# Patient Record
Sex: MALE | Race: WHITE | URBAN - METROPOLITAN AREA
[De-identification: names, ages, dates, MRNs, and addresses within clinical notes are randomized per-mention and may not be internally consistent; named-entity substitution may affect disease eponyms.]

---

## 2019-06-10 ENCOUNTER — INPATIENT (INPATIENT)
Facility: HOSPITAL | Age: 64
LOS: 7 days | Discharge: NURSING, REHAB OR LONG-TERM CARE | End: 2019-06-18
Attending: NEUROLOGICAL SURGERY | Admitting: NEUROLOGICAL SURGERY
Payer: COMMERCIAL

## 2019-06-10 VITALS
SYSTOLIC BLOOD PRESSURE: 154 MMHG | HEART RATE: 66 BPM | WEIGHT: 186.07 LBS | HEIGHT: 70 IN | TEMPERATURE: 97 F | OXYGEN SATURATION: 99 % | DIASTOLIC BLOOD PRESSURE: 83 MMHG | RESPIRATION RATE: 16 BRPM

## 2019-06-10 LAB
ALBUMIN SERPL ELPH-MCNC: 4.7 G/DL — SIGNIFICANT CHANGE UP (ref 3.5–5.2)
ALP SERPL-CCNC: 79 U/L — SIGNIFICANT CHANGE UP (ref 30–115)
ALT FLD-CCNC: 26 U/L — SIGNIFICANT CHANGE UP (ref 0–41)
ANION GAP SERPL CALC-SCNC: 15 MMOL/L — HIGH (ref 7–14)
AST SERPL-CCNC: 26 U/L — SIGNIFICANT CHANGE UP (ref 0–41)
BILIRUB SERPL-MCNC: 0.4 MG/DL — SIGNIFICANT CHANGE UP (ref 0.2–1.2)
BUN SERPL-MCNC: 13 MG/DL — SIGNIFICANT CHANGE UP (ref 10–20)
CALCIUM SERPL-MCNC: 9.7 MG/DL — SIGNIFICANT CHANGE UP (ref 8.5–10.1)
CHLORIDE SERPL-SCNC: 103 MMOL/L — SIGNIFICANT CHANGE UP (ref 98–110)
CO2 SERPL-SCNC: 25 MMOL/L — SIGNIFICANT CHANGE UP (ref 17–32)
CREAT SERPL-MCNC: 0.8 MG/DL — SIGNIFICANT CHANGE UP (ref 0.7–1.5)
GLUCOSE SERPL-MCNC: 100 MG/DL — HIGH (ref 70–99)
HCT VFR BLD CALC: 38.9 % — LOW (ref 42–52)
HGB BLD-MCNC: 13.7 G/DL — LOW (ref 14–18)
MAGNESIUM SERPL-MCNC: 2.2 MG/DL — SIGNIFICANT CHANGE UP (ref 1.8–2.4)
MCHC RBC-ENTMCNC: 31.4 PG — HIGH (ref 27–31)
MCHC RBC-ENTMCNC: 35.2 G/DL — SIGNIFICANT CHANGE UP (ref 32–37)
MCV RBC AUTO: 89 FL — SIGNIFICANT CHANGE UP (ref 80–94)
NRBC # BLD: 0 /100 WBCS — SIGNIFICANT CHANGE UP (ref 0–0)
PLATELET # BLD AUTO: 257 K/UL — SIGNIFICANT CHANGE UP (ref 130–400)
POTASSIUM SERPL-MCNC: 4.1 MMOL/L — SIGNIFICANT CHANGE UP (ref 3.5–5)
POTASSIUM SERPL-SCNC: 4.1 MMOL/L — SIGNIFICANT CHANGE UP (ref 3.5–5)
PROT SERPL-MCNC: 7.5 G/DL — SIGNIFICANT CHANGE UP (ref 6–8)
RBC # BLD: 4.37 M/UL — LOW (ref 4.7–6.1)
RBC # FLD: 12.6 % — SIGNIFICANT CHANGE UP (ref 11.5–14.5)
SODIUM SERPL-SCNC: 143 MMOL/L — SIGNIFICANT CHANGE UP (ref 135–146)
TROPONIN T SERPL-MCNC: <0.01 NG/ML — SIGNIFICANT CHANGE UP
WBC # BLD: 7.84 K/UL — SIGNIFICANT CHANGE UP (ref 4.8–10.8)
WBC # FLD AUTO: 7.84 K/UL — SIGNIFICANT CHANGE UP (ref 4.8–10.8)

## 2019-06-10 PROCEDURE — 93970 EXTREMITY STUDY: CPT | Mod: 26

## 2019-06-10 PROCEDURE — 99223 1ST HOSP IP/OBS HIGH 75: CPT | Mod: AI

## 2019-06-10 PROCEDURE — 70450 CT HEAD/BRAIN W/O DYE: CPT | Mod: 26

## 2019-06-10 PROCEDURE — 71046 X-RAY EXAM CHEST 2 VIEWS: CPT | Mod: 26

## 2019-06-10 PROCEDURE — 99285 EMERGENCY DEPT VISIT HI MDM: CPT

## 2019-06-10 RX ORDER — IBUPROFEN 200 MG
400 TABLET ORAL EVERY 6 HOURS
Refills: 0 | Status: DISCONTINUED | OUTPATIENT
Start: 2019-06-10 | End: 2019-06-13

## 2019-06-10 RX ORDER — HYDROCHLOROTHIAZIDE 25 MG
25 TABLET ORAL DAILY
Refills: 0 | Status: DISCONTINUED | OUTPATIENT
Start: 2019-06-10 | End: 2019-06-14

## 2019-06-10 RX ORDER — ASPIRIN/CALCIUM CARB/MAGNESIUM 324 MG
81 TABLET ORAL DAILY
Refills: 0 | Status: DISCONTINUED | OUTPATIENT
Start: 2019-06-10 | End: 2019-06-13

## 2019-06-10 RX ORDER — SIMVASTATIN 20 MG/1
40 TABLET, FILM COATED ORAL AT BEDTIME
Refills: 0 | Status: DISCONTINUED | OUTPATIENT
Start: 2019-06-10 | End: 2019-06-14

## 2019-06-10 RX ORDER — LISINOPRIL 2.5 MG/1
5 TABLET ORAL DAILY
Refills: 0 | Status: DISCONTINUED | OUTPATIENT
Start: 2019-06-10 | End: 2019-06-14

## 2019-06-10 RX ADMIN — Medication 81 MILLIGRAM(S): at 21:43

## 2019-06-10 NOTE — ED PROVIDER NOTE - NS ED ROS FT
Constitutional: NAD  Eyes: No visual changes, eye pain or discharge.  ENMT: No hearing changes, pain, discharge or infections. No neck pain or stiffness.  Cardiac: No chest pain, SOB or edema. No chest pain with exertion.  Respiratory: No cough or respiratory distress.   GI: No nausea, vomiting, diarrhea or abdominal pain.  : No dysuria, frequency or burning.  MS: No myalgia, muscle weakness, joint pain or back pain.  Neuro: No headache. + weakness/tingling. No LOC.  Skin: No skin rash.  Heme: No bruising

## 2019-06-10 NOTE — ED PROVIDER NOTE - PHYSICAL EXAMINATION
General: AOx4, Non toxic appearing, NAD, speaking in full sentences.   Skin: Warm and dry, no acute rash.   Head:  Normocephalic, atraumatic.   EENT: PERRL/EOMI, conjunctiva and sclera clear. MM moist, no nasal discharge.   Neck: Supple nt, no meningeal signs.   Lymph: No acute cervical lymphadenopathy  Heart RRR s1s2 nl, no rub/murmur.   Lungs- No retractions, BS equal, CTAB.   Abdomen: Soft ntnd no r/g.   Extremities- moves all, +equal distal pulses, brisk cap refill. No LE edema, calves nttp b/l.  Neuro: Sensation wnl, CN 2-12 grossly intact. +5/5 muscle strength throughout except .+3/5 in LUE, +4/5 LLE.  Psych: Cooperative, appropriate

## 2019-06-10 NOTE — ED PROVIDER NOTE - ATTENDING CONTRIBUTION TO CARE
62 yo M PMHx HTN presents with c/o left sided arm weakness associated with tingling sensation to his fingers.  Pt first noted symptoms about 2 weeks ago.  He was experiencing left sided neck pain and thought that it may be a pinched nerve.  He was seen by chiropractor and had xrays done.  No h/o trauma.  Pt finding it difficult to preform tasks such as tieing his shoes.  He also started to note that he is feeling a little off balance,  no change in speech, no headache, no change in vision,  no CP, no SOB.  Pt was seen at [facility] today and sent here for further evaluation.  On exam pt in NAD AAO x 3, no signs of trauma, PERRL, EOMI, face symmetrical, speech is clear and fluent, no midline vertebral tenderness, + spasm to left cervical paraspinals, decreased  left side wih 4/5 left UE, no carotid bruit, abd soft nt nd

## 2019-06-10 NOTE — H&P ADULT - NSHPPHYSICALEXAM_GEN_ALL_CORE
Not in acute distress  General: No pallor, No icterus, afebrile  HEENT: No JVD, no Bruit.  Heart: S1+S2 audible  Lungs: bilateral  fair air entry, no wheezing, no crepitations.  Abdomen: Soft, non-tender, non-distended , no  rigidity or guarding.  CNS:  sensations intact. L UE 4/5. RUE 5/5, LLE 4/5 , RLE 5/5  Extremities:  No edema Not in acute distress  General: No pallor, No icterus, afebrile  HEENT: No JVD, no Bruit.  Heart: S1+S2 audible  Lungs: bilateral  fair air entry, no wheezing, no crepitations.  Abdomen: Soft, non-tender, non-distended , no  rigidity or guarding.  CNS:  sensations intact. L UE 4/5. RUE 5/5, LLE 4/5 , RLE 5/5, gait abnormality  Extremities:  No edema

## 2019-06-10 NOTE — H&P ADULT - ASSESSMENT
63 year old  man with past medical history of  Hypertension only,  presents with complaint of L sided weakness and L arm numbness/tingling which he woke up with around 5/24. Pt     initially had some L sided neck pain and attributed the symptoms to a pinched nerve    Admitted to rule out sub-Acute Ischemic Stroke:  Neuro checks q shift  Aspirin 81 milligrams once daily   O2 via NC@2l/min to keep sat>94% at all times.  Tylenol PRN for Fever/pain  CT head is non-significant for acute changes    MRI-brain, cervical, MRA head/neck.  Zocor 40mg PO QHS.  Bed rest with Aspiration, Fall, Seizure precautions.  Neurology consult  Hypertension - well controlled on HCTZ & lisinopril  Active smoker- nicotinepatch  Will get TSH, HbA1c , lipid panel, lyme ab   PT and Rehab Consult after acute condition

## 2019-06-10 NOTE — H&P ADULT - NSHPREVIEWOFSYSTEMS_GEN_ALL_CORE
Patient denies any headache, any vision complaints, runny nose, fever, chills, sore throat. Denies chest pain, shortness of     breath, palpitation. Denies nausea, vomiting, abdominal pain, diarrhoea, Denies urinary burning, urgency, frequency, dysuria.   At least 10 systems were reviewed in ROS. All systems reviewed  are within normal limits except for the complaints as described in Subjective.

## 2019-06-10 NOTE — H&P ADULT - HISTORY OF PRESENT ILLNESS
63 year old  man with past medical history of  Hypertension  and active smoking only,  presents with complaint of L sided weakness and L arm numbness/tingling which he woke up with around 5/24. Pt     initially had some L sided neck pain and attributed the symptoms to a pinched nerve. Pt saw chiropractor who did plain films and concurred. Pt came to ED as the weakness is     worsening and affecting his work as a [employer]. Patient denies any headache, any vision complaints, runny nose, fever, chills, sore throat. Denies chest pain, shortness of     breath, palpitation. Denies nausea, vomiting, abdominal pain, diarrhoea, Denies urinary burning, urgency, frequency, dysuria.

## 2019-06-10 NOTE — ED PROVIDER NOTE - OBJECTIVE STATEMENT
63M pmh HTN p/w L sided weakness and L arm numbness/tingling which he woke up with around 5/24/19. Pt initially had some L sided neck pain and attributed the symptoms to a pinched nerve. Pt saw chiropractor who did plain films and concurred. 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 Denies f/c, HA, CP, back pain.

## 2019-06-11 LAB
ANION GAP SERPL CALC-SCNC: 10 MMOL/L — SIGNIFICANT CHANGE UP (ref 7–14)
B BURGDOR C6 AB SER-ACNC: NEGATIVE — SIGNIFICANT CHANGE UP
B BURGDOR IGG+IGM SER-ACNC: 0.03 INDEX — SIGNIFICANT CHANGE UP (ref 0.01–0.89)
BUN SERPL-MCNC: 14 MG/DL — SIGNIFICANT CHANGE UP (ref 10–20)
CALCIUM SERPL-MCNC: 9.3 MG/DL — SIGNIFICANT CHANGE UP (ref 8.5–10.1)
CHLORIDE SERPL-SCNC: 103 MMOL/L — SIGNIFICANT CHANGE UP (ref 98–110)
CO2 SERPL-SCNC: 29 MMOL/L — SIGNIFICANT CHANGE UP (ref 17–32)
CREAT SERPL-MCNC: 0.9 MG/DL — SIGNIFICANT CHANGE UP (ref 0.7–1.5)
ESTIMATED AVERAGE GLUCOSE: 117 MG/DL — HIGH (ref 68–114)
GLUCOSE SERPL-MCNC: 106 MG/DL — HIGH (ref 70–99)
HBA1C BLD-MCNC: 5.7 % — HIGH (ref 4–5.6)
HCT VFR BLD CALC: 40.9 % — LOW (ref 42–52)
HCV AB S/CO SERPL IA: 12.87 S/CO — HIGH (ref 0–0.99)
HCV AB SERPL-IMP: REACTIVE
HGB BLD-MCNC: 13.9 G/DL — LOW (ref 14–18)
MCHC RBC-ENTMCNC: 30.6 PG — SIGNIFICANT CHANGE UP (ref 27–31)
MCHC RBC-ENTMCNC: 34 G/DL — SIGNIFICANT CHANGE UP (ref 32–37)
MCV RBC AUTO: 90.1 FL — SIGNIFICANT CHANGE UP (ref 80–94)
NRBC # BLD: 0 /100 WBCS — SIGNIFICANT CHANGE UP (ref 0–0)
PLATELET # BLD AUTO: 244 K/UL — SIGNIFICANT CHANGE UP (ref 130–400)
POTASSIUM SERPL-MCNC: 4 MMOL/L — SIGNIFICANT CHANGE UP (ref 3.5–5)
POTASSIUM SERPL-SCNC: 4 MMOL/L — SIGNIFICANT CHANGE UP (ref 3.5–5)
RBC # BLD: 4.54 M/UL — LOW (ref 4.7–6.1)
RBC # FLD: 12.8 % — SIGNIFICANT CHANGE UP (ref 11.5–14.5)
SODIUM SERPL-SCNC: 142 MMOL/L — SIGNIFICANT CHANGE UP (ref 135–146)
TSH SERPL-MCNC: 2.79 UIU/ML — SIGNIFICANT CHANGE UP (ref 0.27–4.2)
WBC # BLD: 6.57 K/UL — SIGNIFICANT CHANGE UP (ref 4.8–10.8)
WBC # FLD AUTO: 6.57 K/UL — SIGNIFICANT CHANGE UP (ref 4.8–10.8)

## 2019-06-11 PROCEDURE — 72142 MRI NECK SPINE W/DYE: CPT | Mod: 26

## 2019-06-11 PROCEDURE — 99223 1ST HOSP IP/OBS HIGH 75: CPT

## 2019-06-11 PROCEDURE — 99233 SBSQ HOSP IP/OBS HIGH 50: CPT

## 2019-06-11 PROCEDURE — 70552 MRI BRAIN STEM W/DYE: CPT | Mod: 26

## 2019-06-11 PROCEDURE — 70548 MR ANGIOGRAPHY NECK W/DYE: CPT | Mod: 26

## 2019-06-11 RX ORDER — NICOTINE POLACRILEX 2 MG
1 GUM BUCCAL DAILY
Refills: 0 | Status: DISCONTINUED | OUTPATIENT
Start: 2019-06-11 | End: 2019-06-11

## 2019-06-11 RX ORDER — NICOTINE POLACRILEX 2 MG
1 GUM BUCCAL DAILY
Refills: 0 | Status: DISCONTINUED | OUTPATIENT
Start: 2019-06-11 | End: 2019-06-14

## 2019-06-11 RX ORDER — HEPARIN SODIUM 5000 [USP'U]/ML
5000 INJECTION INTRAVENOUS; SUBCUTANEOUS EVERY 8 HOURS
Refills: 0 | Status: DISCONTINUED | OUTPATIENT
Start: 2019-06-11 | End: 2019-06-14

## 2019-06-11 RX ORDER — OXYCODONE AND ACETAMINOPHEN 5; 325 MG/1; MG/1
2 TABLET ORAL EVERY 6 HOURS
Refills: 0 | Status: DISCONTINUED | OUTPATIENT
Start: 2019-06-11 | End: 2019-06-14

## 2019-06-11 RX ORDER — DEXAMETHASONE 0.5 MG/5ML
4 ELIXIR ORAL EVERY 6 HOURS
Refills: 0 | Status: DISCONTINUED | OUTPATIENT
Start: 2019-06-11 | End: 2019-06-14

## 2019-06-11 RX ORDER — OXYCODONE HYDROCHLORIDE 5 MG/1
7.5 TABLET ORAL ONCE
Refills: 0 | Status: DISCONTINUED | OUTPATIENT
Start: 2019-06-11 | End: 2019-06-11

## 2019-06-11 RX ORDER — PANTOPRAZOLE SODIUM 20 MG/1
40 TABLET, DELAYED RELEASE ORAL
Refills: 0 | Status: DISCONTINUED | OUTPATIENT
Start: 2019-06-11 | End: 2019-06-11

## 2019-06-11 RX ORDER — PANTOPRAZOLE SODIUM 20 MG/1
40 TABLET, DELAYED RELEASE ORAL
Refills: 0 | Status: DISCONTINUED | OUTPATIENT
Start: 2019-06-11 | End: 2019-06-14

## 2019-06-11 RX ORDER — DEXAMETHASONE 0.5 MG/5ML
10 ELIXIR ORAL ONCE
Refills: 0 | Status: COMPLETED | OUTPATIENT
Start: 2019-06-11 | End: 2019-06-11

## 2019-06-11 RX ADMIN — OXYCODONE AND ACETAMINOPHEN 2 TABLET(S): 5; 325 TABLET ORAL at 11:46

## 2019-06-11 RX ADMIN — Medication 81 MILLIGRAM(S): at 11:23

## 2019-06-11 RX ADMIN — OXYCODONE AND ACETAMINOPHEN 2 TABLET(S): 5; 325 TABLET ORAL at 11:24

## 2019-06-11 RX ADMIN — OXYCODONE AND ACETAMINOPHEN 2 TABLET(S): 5; 325 TABLET ORAL at 17:21

## 2019-06-11 RX ADMIN — Medication 25 MILLIGRAM(S): at 05:38

## 2019-06-11 RX ADMIN — OXYCODONE HYDROCHLORIDE 7.5 MILLIGRAM(S): 5 TABLET ORAL at 05:56

## 2019-06-11 RX ADMIN — LISINOPRIL 5 MILLIGRAM(S): 2.5 TABLET ORAL at 05:38

## 2019-06-11 RX ADMIN — Medication 1 PATCH: at 21:31

## 2019-06-11 RX ADMIN — Medication 1 PATCH: at 11:24

## 2019-06-11 RX ADMIN — Medication 10 MILLIGRAM(S): at 21:30

## 2019-06-11 NOTE — PHYSICAL THERAPY INITIAL EVALUATION ADULT - TRANSFER SAFETY CONCERNS NOTED: SIT/STAND, REHAB EVAL
decreased proprioception/losing balance/decreased step length/decreased balance during turns/decreased weight-shifting ability

## 2019-06-11 NOTE — PHYSICAL THERAPY INITIAL EVALUATION ADULT - GENERAL OBSERVATIONS, REHAB EVAL
10:40 - 11:01. Chart reviewed. Patient available to be seen for physical therapy, confirmed with nurse. Patient encountered already seated at edge of bed.  Agreeable for PT evaluation.

## 2019-06-11 NOTE — PHYSICAL THERAPY INITIAL EVALUATION ADULT - GAIT DEVIATIONS NOTED, PT EVAL
decreased chava/increased time in double stance/decreased step length/decreased weight-shifting ability

## 2019-06-11 NOTE — PROVIDER CONTACT NOTE (OTHER) - SITUATION
During site verification of Nicotine patch pt reports patch fell off at [facility] today during MRI.

## 2019-06-11 NOTE — CHART NOTE - NSCHARTNOTEFT_GEN_A_CORE
will order 7.5 mg oxycodone for back pain, single dose will order 7.5 mg oxycodone for back pain, single dose. He uses hydrocodone at home

## 2019-06-11 NOTE — CONSULT NOTE ADULT - SUBJECTIVE AND OBJECTIVE BOX
Neurology Consultation note    Name  INDIA HAYES    HPI:  63 year old  man with past medical history of  Hypertension  and active smoking only,  presents with complaint of L sided weakness and L arm numbness/tingling which he woke up with around 5/24. Pt     initially had some L sided neck pain and attributed the symptoms to a pinched nerve. Pt saw chiropractor who did plain films and concurred. Pt came to ED as the weakness is     worsening and affecting his work as a [employer]. Patient denies any headache, any vision complaints, runny nose, fever, chills, sore throat. Denies chest pain, shortness of     breath, palpitation. Denies nausea, vomiting, abdominal pain, diarrhoea, Denies urinary burning, urgency, frequency, dysuria. (10 Adonis 2019 18:44)    NEURO:  PATIENT IS A 62 YO MAN WITH HX OF HTN WHO PRESENTS WITH 2 WEEKS OF L HP. HE STATES THAT IT STARTED WITH NECK PAIN WHICH RADIATED DOWN THE LEFT ARM.  IT PROGRESSED OVER 2 WEEKS TO INVOLVE THE RIGHT ARM. BOTH HANDS ARE NUMB AND WEAK.  FURTHER PROGRESSION LED TO WEAKNESS OF THE LEFT LEG AND IMPAIRED GAIT. HE FINALLY CAME TO THE ER BECAUSE THE HAND WEAKNESS WAS INTERFERING WITH HIS WORK AS AN . NO SPHINCTER DYSFUNCTION. NO FACIAL INVOLVEMENT.      Vital Signs Last 24 Hrs  T(C): 35.7 (11 Jun 2019 05:47), Max: 36.7 (10 Adonis 2019 17:05)  T(F): 96.2 (11 Jun 2019 05:47), Max: 98.1 (10 Adonis 2019 17:05)  HR: 61 (11 Jun 2019 05:47) (59 - 67)  BP: 150/77 (11 Jun 2019 05:47) (128/74 - 159/78)  BP(mean): --  RR: 16 (11 Jun 2019 05:47) (16 - 16)  SpO2: 98% (10 Adonis 2019 19:40) (98% - 99%)    Neurological Exam:   Mental status: Awake, alert and oriented x3.  Recent and remote memory intact.  Naming, repetition and comprehension intact.  Attention/concentration intact.  No dysarthria, no aphasia.  Fund of knowledge appropriate.    Cranial nerves: Pupils equally round and reactive to light, visual fields full, no nystagmus, extraocular muscles intact, V1 through V3 intact bilaterally and symmetric, face symmetric, hearing intact to finger rub, palate elevation symmetric, tongue was midline.  Motor:  MRC grading 5/5 b/l UE/LE.   strength 5/5.  Normal tone and bulk.  No abnormal movements.    Sensation: Intact to light touch, proprioception, and pinprick.   Coordination: No dysmetria on finger-to-nose and heel-to-shin.  No dysdiadokinesia.  Reflexes: 2+ in bilateral UE/LE, downgoing toes bilaterally. (-) Boykin.  Gait: Narrow and steady. No ataxia.  Romberg negative    Medications  aspirin  chewable 81 milliGRAM(s) Oral daily  heparin  Injectable 5000 Unit(s) SubCutaneous every 8 hours  hydrochlorothiazide 25 milliGRAM(s) Oral daily  ibuprofen  Tablet. 400 milliGRAM(s) Oral every 6 hours PRN  lisinopril 5 milliGRAM(s) Oral daily  nicotine - 21 mG/24Hr(s) Patch 1 patch Transdermal daily  oxyCODONE    5 mG/acetaminophen 325 mG 2 Tablet(s) Oral every 6 hours PRN  simvastatin 40 milliGRAM(s) Oral at bedtime      Lab  06-11    142  |  103  |  14  ----------------------------<  106<H>  4.0   |  29  |  0.9    Ca    9.3      11 Jun 2019 06:51  Mg     2.2     06-10    TPro  7.5  /  Alb  4.7  /  TBili  0.4  /  DBili  x   /  AST  26  /  ALT  26  /  AlkPhos  79  06-10                          13.9   6.57  )-----------( 244      ( 11 Jun 2019 06:51 )             40.9     LIVER FUNCTIONS - ( 10 Adonis 2019 17:18 )  Alb: 4.7 g/dL / Pro: 7.5 g/dL / ALK PHOS: 79 U/L / ALT: 26 U/L / AST: 26 U/L / GGT: x             2.2        Radiology  CT Head No Cont:   EXAM:  CT BRAIN            PROCEDURE DATE:  06/10/2019            INTERPRETATION:  Clinical History / Reason for exam: Patient with   left-sided weakness left arm numbness and tingling.    CT SCAN OF THE BRAIN WITHOUT CONTRAST    TECHNIQUE:    Multiple transaxial noncontrast CT images of the brain were obtained from   base to vertex. Sagittal and coronal reformatted images were obtained.    COMPARISON:    Noncontrast CT scan of the brain dated August 29, 2005.    FINDINGS:    The third, fourth,and lateral ventricles are normal in size and   position.     There is no shift of the midline structures or evidence of acute   intracranial hemorrhage or depressed skull fracture.     Bilateral basal ganglia calcifications.    Mucosal thickening inthe bilateral frontal, bilateral ethmoid, and left   sphenoid sinuses.    No large territorial infarct.    IMPRESSION:     No acute intracranial hemorrhage or large territorial infarct.    In view of the patient's stated clinical history of possible   cerebrovascular accident, follow-up examination is recommended, if   clinically warranted.                  MARIAMA JARVIS M.D., ATTENDING RADIOLOGIST  This document has been electronically signed. Adonis 10 2019  5:41PM             (06-10-19 @ 17:32)      Assessment:    Plan: Neurology Consultation note    Name  INDIA HAYES    HPI:  63 year old  man with past medical history of  Hypertension  and active smoking only,  presents with complaint of L sided weakness and L arm numbness/tingling which he woke up with around 5/24. Pt     initially had some L sided neck pain and attributed the symptoms to a pinched nerve. Pt saw chiropractor who did plain films and concurred. Pt came to ED as the weakness is     worsening and affecting his work as a [employer]. Patient denies any headache, any vision complaints, runny nose, fever, chills, sore throat. Denies chest pain, shortness of     breath, palpitation. Denies nausea, vomiting, abdominal pain, diarrhoea, Denies urinary burning, urgency, frequency, dysuria. (10 Adonis 2019 18:44)    NEURO:  PATIENT IS A 64 YO MAN WITH HX OF HTN WHO PRESENTS WITH 2 WEEKS OF L HP. HE STATES THAT IT STARTED WITH NECK PAIN WHICH RADIATED DOWN THE LEFT ARM.  IT PROGRESSED OVER 2 WEEKS TO INVOLVE THE RIGHT ARM. BOTH HANDS ARE NUMB AND WEAK.  FURTHER PROGRESSION LED TO WEAKNESS OF THE LEFT LEG AND IMPAIRED GAIT. HE FINALLY CAME TO THE ER BECAUSE THE HAND WEAKNESS WAS INTERFERING WITH HIS WORK AS AN . NO SPHINCTER DYSFUNCTION. NO FACIAL INVOLVEMENT.  STATES HE FEELS LIKE HE PULLED A TENSE MUSCLE ON THE LEFT       Vital Signs Last 24 Hrs  T(C): 35.7 (11 Jun 2019 05:47), Max: 36.7 (10 Adonis 2019 17:05)  T(F): 96.2 (11 Jun 2019 05:47), Max: 98.1 (10 Adonis 2019 17:05)  HR: 61 (11 Jun 2019 05:47) (59 - 67)  BP: 150/77 (11 Jun 2019 05:47) (128/74 - 159/78)  BP(mean): --  RR: 16 (11 Jun 2019 05:47) (16 - 16)  SpO2: 98% (10 Adonis 2019 19:40) (98% - 99%)    Neurological Exam:   Mental status: Awake, alert and oriented x3.  Recent and remote memory intact.  Naming, repetition and comprehension intact.  Attention/concentration intact.  No dysarthria, no aphasia.  Fund of knowledge appropriate.    Cranial nerves: Pupils equally round and reactive to light, visual fields full, no nystagmus, extraocular muscles intact, V1 through V3 intact bilaterally and symmetric, face symmetric, hearing intact to finger rub, palate elevation symmetric, tongue was midline.  Motor:  MRC grading 4/5 LUE, 4+ PROXIMAL LLE  Sensation: Intact to light touch, proprioception, and pinprick.  NO SENSORY LEVEL  Coordination: No dysmetria on finger-to-nose and heel-to-shin.  No dysdiadokinesia.  Reflexes: 2+ in bilateral UE/LE, downgoing toes bilaterally. (-) Boykin.  Gait: circumduction LLE, SPASTIC    Medications  aspirin  chewable 81 milliGRAM(s) Oral daily  heparin  Injectable 5000 Unit(s) SubCutaneous every 8 hours  hydrochlorothiazide 25 milliGRAM(s) Oral daily  ibuprofen  Tablet. 400 milliGRAM(s) Oral every 6 hours PRN  lisinopril 5 milliGRAM(s) Oral daily  nicotine - 21 mG/24Hr(s) Patch 1 patch Transdermal daily  oxyCODONE    5 mG/acetaminophen 325 mG 2 Tablet(s) Oral every 6 hours PRN  simvastatin 40 milliGRAM(s) Oral at bedtime      Lab  06-11    142  |  103  |  14  ----------------------------<  106<H>  4.0   |  29  |  0.9    Ca    9.3      11 Jun 2019 06:51  Mg     2.2     06-10    TPro  7.5  /  Alb  4.7  /  TBili  0.4  /  DBili  x   /  AST  26  /  ALT  26  /  AlkPhos  79  06-10                          13.9   6.57  )-----------( 244      ( 11 Jun 2019 06:51 )             40.9     LIVER FUNCTIONS - ( 10 Adonis 2019 17:18 )  Alb: 4.7 g/dL / Pro: 7.5 g/dL / ALK PHOS: 79 U/L / ALT: 26 U/L / AST: 26 U/L / GGT: x             2.2        Radiology  CT Head No Cont:   EXAM:  CT BRAIN            PROCEDURE DATE:  06/10/2019            INTERPRETATION:  Clinical History / Reason for exam: Patient with   left-sided weakness left arm numbness and tingling.    CT SCAN OF THE BRAIN WITHOUT CONTRAST    TECHNIQUE:    Multiple transaxial noncontrast CT images of the brain were obtained from   base to vertex. Sagittal and coronal reformatted images were obtained.    COMPARISON:    Noncontrast CT scan of the brain dated August 29, 2005.    FINDINGS:    The third, fourth,and lateral ventricles are normal in size and   position.     There is no shift of the midline structures or evidence of acute   intracranial hemorrhage or depressed skull fracture.     Bilateral basal ganglia calcifications.    Mucosal thickening inthe bilateral frontal, bilateral ethmoid, and left   sphenoid sinuses.    No large territorial infarct.    IMPRESSION:     No acute intracranial hemorrhage or large territorial infarct.    In view of the patient's stated clinical history of possible   cerebrovascular accident, follow-up examination is recommended, if   clinically warranted.                Assessment: 64 YO MAN  WITH 2 WEEKS OF L > R SIDED WEAKNESS PRECEDED BY NECK PAIN AND LUE PAIN.  PATIENT ALSO WITH GRADUAL PROGRESSION OF SYMPTOMS AND NOW BILATERAL HAND NUMBNESS.  THIS CONSTELLATION OF SYMPTOMS AND GRADUAL PROGRESSION OVER 2 WEEKS MAKE CERVICAL COR PATHOLOGY MORE LIKELY. GIVEN LEFT SIDED PREPONDERANCE WILL ALSO IMAGE THE BRAIN.    Plan:  MRI C SPINE AND BRAIN NC  PT/ERHAB  FURTHER RECS TO FOLLOW MRI

## 2019-06-11 NOTE — PROGRESS NOTE ADULT - SUBJECTIVE AND OBJECTIVE BOX
Progress Note:  Provider Speciality                            Hospitalist      CELYINDIA NIETO MRN-[id number] 63y Male     CHIEF PRESENTING COMPLAINT:  Patient is a 63y old  Male who presents with a chief complaint of L sided weakness (10 Adonis 2019 18:44)        SUBJECTIVE:  Patient was seen and examined at bedside. Reports no improvement in  presenting complaint. No significant overnight events reported.     HISTORY OF PRESENTING ILLNESS:  HPI:  63 year old  man with past medical history of  Hypertension  and active smoking only,  presents with complaint of L sided weakness and L arm numbness/tingling which he woke up with around 5/24. Pt     initially had some L sided neck pain and attributed the symptoms to a pinched nerve. Pt saw chiropractor who did plain films and concurred. Pt came to ED as the weakness is     worsening and affecting his work as a [employer]. Patient denies any headache, any vision complaints, runny nose, fever, chills, sore throat. Denies chest pain, shortness of     breath, palpitation. Denies nausea, vomiting, abdominal pain, diarrhoea, Denies urinary burning, urgency, frequency, dysuria. (10 Adonis 2019 18:44)      PAST MEDICAL & SURGICAL HISTORY:  PAST MEDICAL & SURGICAL HISTORY:  Back pain  HTN (hypertension)  No significant past surgical history      VITAL SIGNS:  Vital Signs Last 24 Hrs  T(C): 35.7 (11 Jun 2019 05:47), Max: 36.7 (10 Adonis 2019 17:05)  T(F): 96.2 (11 Jun 2019 05:47), Max: 98.1 (10 Adonis 2019 17:05)  HR: 61 (11 Jun 2019 05:47) (59 - 67)  BP: 150/77 (11 Jun 2019 05:47) (128/74 - 159/78)  BP(mean): --  RR: 16 (11 Jun 2019 05:47) (16 - 16)  SpO2: 98% (10 Adonis 2019 19:40) (98% - 99%)    PHYSICAL EXAMINATION:  Not in acute distress  General: No pallor, or icterus, afebrile  HEENT:  LULI, EOMI, no JVD, no Bruit.  Heart: S1+S2 audible, no murmur  Lungs: bilateral  fair air entry, no wheezing, no crepitations.  Abdomen: Soft, non-tender, non-distended , no  rigidity or guarding.  CNS: AAOx3, CN  grossly intact.  Extremities:  No edema          REVIEW OF SYSTEMS:  Patient denies any headache, any vision complaints, runny nose, fever, chills, sore throat. Denies chest pain, shortness of breath, palpitation. Denies nausea, vomiting, abdominal pain, diarrhoea, Denies urinary burning, urgency, frequency, dysuria. Denies weakness in any part of the body or numbness.   At least 10 systems were reviewed in ROS. All systems reviewed  are within normal limits except for the complaints as described in Subjective.    CONSULTS:  Consultant(s) Notes Reviewed by me.   Care Discussed with Consultants/Other Providers where required.        MEDICATIONS:  MEDICATIONS  (STANDING):  aspirin  chewable 81 milliGRAM(s) Oral daily  heparin  Injectable 5000 Unit(s) SubCutaneous every 8 hours  hydrochlorothiazide 25 milliGRAM(s) Oral daily  lisinopril 5 milliGRAM(s) Oral daily  simvastatin 40 milliGRAM(s) Oral at bedtime    MEDICATIONS  (PRN):  ibuprofen  Tablet. 400 milliGRAM(s) Oral every 6 hours PRN Moderate Pain (4 - 6)      LABOROTORY DATA/MICROBIOLOGY/I & O's:                        13.9   6.57  )-----------( 244      ( 11 Jun 2019 06:51 )             40.9     06-11    142  |  103  |  14  ----------------------------<  106<H>  4.0   |  29  |  0.9    Ca    9.3      11 Jun 2019 06:51  Mg     2.2     06-10    TPro  7.5  /  Alb  4.7  /  TBili  0.4  /  DBili  x   /  AST  26  /  ALT  26  /  AlkPhos  79  06-10        CAPILLARY BLOOD GLUCOSE      POCT Blood Glucose.: 126 mg/dL (10 Adonis 2019 16:41)                        ASSESSMENT:      63 year old  man with past medical history of  Hypertension only,  presents with complaint of L sided weakness and L arm numbness/tingling which he woke up with around 5/24. Pt     initially had some L sided neck pain and attributed the symptoms to a pinched nerve    Admitted to rule out sub-Acute Ischemic Stroke:  Neuro checks q shift  Aspirin 81 milligrams once daily   O2 via NC@2l/min to keep sat>94% at all times.  oxycodone PRN for Fever/pain  CT head is non-significant for acute changes    MRI-brain, cervical, MRA head/neck- pending.  Zocor 40mg PO QHS.  Aspiration, Fall, Seizure precautions.  Neurology consult awaited  Hypertension - well controlled on HCTZ & lisinopril  Active smoker- nicotine patch  Will get TSH, HbA1c , lipid panel, lyme ab   PT and Rehab Consult after acute condition  settles Progress Note:  Provider Speciality                            Hospitalist      CELYINDIA NIETO [id number] 63y Male     CHIEF PRESENTING COMPLAINT:  Patient is a 63y old  Male who presents with a chief complaint of L sided weakness (10 Adonis 2019 18:44)        SUBJECTIVE:  Patient was seen and examined at bedside. Reports no improvement in  presenting complaint. No significant overnight events reported.     HISTORY OF PRESENTING ILLNESS:  HPI:  63 year old  man with past medical history of  Hypertension  and active smoking only,  presents with complaint of L sided weakness and L arm numbness/tingling which he woke up with around 5/24. Pt     initially had some L sided neck pain and attributed the symptoms to a pinched nerve. Pt saw chiropractor who did plain films and concurred. Pt came to ED as the weakness is     worsening and affecting his work as a [employer]. Patient denies any headache, any vision complaints, runny nose, fever, chills, sore throat. Denies chest pain, shortness of     breath, palpitation. Denies nausea, vomiting, abdominal pain, diarrhoea, Denies urinary burning, urgency, frequency, dysuria. (10 Adonis 2019 18:44)      PAST MEDICAL & SURGICAL HISTORY:  PAST MEDICAL & SURGICAL HISTORY:  Back pain  HTN (hypertension)  No significant past surgical history      VITAL SIGNS:  Vital Signs Last 24 Hrs  T(C): 35.7 (11 Jun 2019 05:47), Max: 36.7 (10 Adonis 2019 17:05)  T(F): 96.2 (11 Jun 2019 05:47), Max: 98.1 (10 Adonis 2019 17:05)  HR: 61 (11 Jun 2019 05:47) (59 - 67)  BP: 150/77 (11 Jun 2019 05:47) (128/74 - 159/78)  BP(mean): --  RR: 16 (11 Jun 2019 05:47) (16 - 16)  SpO2: 98% (10 Adonis 2019 19:40) (98% - 99%)    PHYSICAL EXAMINATION:  Physical Exam: Not in acute distress  General: No pallor, No icterus, afebrile  HEENT: No JVD, no Bruit.  Heart: S1+S2 audible  Lungs: bilateral  fair air entry, no wheezing, no crepitations.  Abdomen: Soft, non-tender, non-distended , no  rigidity or guarding.  CNS:  sensations intact. L UE 4/5. RUE 5/5, LLE 4/5 , RLE 5/5, gait abnormality Extremities:  No edema	      REVIEW OF SYSTEMS:  Patient denies any headache, any vision complaints, runny nose, fever, chills, sore throat. Denies chest pain, shortness of breath, palpitation. Denies nausea, vomiting, abdominal pain, diarrhoea, Denies urinary burning, urgency, frequency, dysuria. Denies weakness in any part of the body or numbness.   At least 10 systems were reviewed in ROS. All systems reviewed  are within normal limits except for the complaints as described in Subjective.    CONSULTS:  Consultant(s) Notes Reviewed by me.   Care Discussed with Consultants/Other Providers where required.        MEDICATIONS:  MEDICATIONS  (STANDING):  aspirin  chewable 81 milliGRAM(s) Oral daily  heparin  Injectable 5000 Unit(s) SubCutaneous every 8 hours  hydrochlorothiazide 25 milliGRAM(s) Oral daily  lisinopril 5 milliGRAM(s) Oral daily  simvastatin 40 milliGRAM(s) Oral at bedtime    MEDICATIONS  (PRN):  ibuprofen  Tablet. 400 milliGRAM(s) Oral every 6 hours PRN Moderate Pain (4 - 6)      [facility]/MICROBIOLOG[facility]/I & O's:                        13.9   6.57  )-----------( 244      ( 11 Jun 2019 06:51 )             40.9     06-11    142  |  103  |  14  ----------------------------<  106<H>  4.0   |  29  |  0.9    Ca    9.3      11 Jun 2019 06:51  Mg     2.2     06-10    TPro  7.5  /  Alb  4.7  /  TBili  0.4  /  DBili  x   /  AST  26  /  ALT  26  /  AlkPhos  79  06-10        CAPILLARY BLOOD GLUCOSE      POCT Blood Glucose.: 126 mg/dL (10 Adonis 2019 16:41)                        ASSESSMENT:      63 year old  man with past medical history of  Hypertension only,  presents with complaint of L sided weakness and L arm numbness/tingling which he woke up with around 5/24. Pt     initially had some L sided neck pain and attributed the symptoms to a pinched nerve    Admitted for significant gait abnormality which is relatively acute  and to rule out sub-Acute Ischemic Stroke:  Neuro checks q shift  Aspirin 81 milligrams once daily   O2 via NC@2l/min to keep sat>94% at all times.  oxycodone PRN for Fever/pain  CT head is non-significant for acute changes    MRI-brain, cervical, MRA head/neck- pending.  Zocor 40mg PO QHS.  Aspiration, Fall, Seizure precautions.  Neurology consult awaited  Hypertension - well controlled on HCTZ & lisinopril  Active smoker- nicotine patch  Will get TSH, HbA1c , lipid panel, lyme ab   PT and Rehab Consult after acute condition  settles

## 2019-06-11 NOTE — PHYSICAL THERAPY INITIAL EVALUATION ADULT - IMPAIRMENTS FOUND, PT EVAL
aerobic capacity/endurance/muscle strength/ergonomics and body mechanics/gait, locomotion, and balance/posture/ROM

## 2019-06-11 NOTE — CHART NOTE - NSCHARTNOTEFT_GEN_A_CORE
Patient concerned about arm (left) swelling with decreased movement and a vague discomfort to the back affecting lower extremity movements. I ordered sonogram of arms but no clot seen (prelim). Patient's current concern is Lyme' s. Will defer further evaluation to day staff Patient concerned about arm (left) swelling with decreased movement and a vague discomfort to the back affecting lower extremity movements. I ordered sonogram of arms but no clot seen (prelim). Patient's current concern is Lyme' s. Will defer further evaluation to day staff (lyme study already ordered)

## 2019-06-12 LAB
ANION GAP SERPL CALC-SCNC: 12 MMOL/L — SIGNIFICANT CHANGE UP (ref 7–14)
BUN SERPL-MCNC: 17 MG/DL — SIGNIFICANT CHANGE UP (ref 10–20)
CALCIUM SERPL-MCNC: 9.7 MG/DL — SIGNIFICANT CHANGE UP (ref 8.5–10.1)
CHLORIDE SERPL-SCNC: 102 MMOL/L — SIGNIFICANT CHANGE UP (ref 98–110)
CO2 SERPL-SCNC: 26 MMOL/L — SIGNIFICANT CHANGE UP (ref 17–32)
CREAT SERPL-MCNC: 0.8 MG/DL — SIGNIFICANT CHANGE UP (ref 0.7–1.5)
GLUCOSE SERPL-MCNC: 172 MG/DL — HIGH (ref 70–99)
HCT VFR BLD CALC: 42.8 % — SIGNIFICANT CHANGE UP (ref 42–52)
HCV RNA FLD QL NAA+PROBE: SIGNIFICANT CHANGE UP
HCV RNA SPEC QL PROBE+SIG AMP: SIGNIFICANT CHANGE UP
HGB BLD-MCNC: 14.6 G/DL — SIGNIFICANT CHANGE UP (ref 14–18)
MCHC RBC-ENTMCNC: 30.7 PG — SIGNIFICANT CHANGE UP (ref 27–31)
MCHC RBC-ENTMCNC: 34.1 G/DL — SIGNIFICANT CHANGE UP (ref 32–37)
MCV RBC AUTO: 89.9 FL — SIGNIFICANT CHANGE UP (ref 80–94)
NRBC # BLD: 0 /100 WBCS — SIGNIFICANT CHANGE UP (ref 0–0)
PLATELET # BLD AUTO: 264 K/UL — SIGNIFICANT CHANGE UP (ref 130–400)
POTASSIUM SERPL-MCNC: 4.1 MMOL/L — SIGNIFICANT CHANGE UP (ref 3.5–5)
POTASSIUM SERPL-SCNC: 4.1 MMOL/L — SIGNIFICANT CHANGE UP (ref 3.5–5)
RBC # BLD: 4.76 M/UL — SIGNIFICANT CHANGE UP (ref 4.7–6.1)
RBC # FLD: 12.5 % — SIGNIFICANT CHANGE UP (ref 11.5–14.5)
SODIUM SERPL-SCNC: 140 MMOL/L — SIGNIFICANT CHANGE UP (ref 135–146)
T PALLIDUM AB TITR SER: NEGATIVE — SIGNIFICANT CHANGE UP
WBC # BLD: 5.93 K/UL — SIGNIFICANT CHANGE UP (ref 4.8–10.8)
WBC # FLD AUTO: 5.93 K/UL — SIGNIFICANT CHANGE UP (ref 4.8–10.8)

## 2019-06-12 PROCEDURE — 99233 SBSQ HOSP IP/OBS HIGH 50: CPT

## 2019-06-12 PROCEDURE — 99024 POSTOP FOLLOW-UP VISIT: CPT

## 2019-06-12 PROCEDURE — 99232 SBSQ HOSP IP/OBS MODERATE 35: CPT

## 2019-06-12 RX ORDER — NICOTINE POLACRILEX 2 MG
1 GUM BUCCAL DAILY
Refills: 0 | Status: DISCONTINUED | OUTPATIENT
Start: 2019-06-12 | End: 2019-06-14

## 2019-06-12 RX ADMIN — Medication 1 PATCH: at 11:54

## 2019-06-12 RX ADMIN — HEPARIN SODIUM 5000 UNIT(S): 5000 INJECTION INTRAVENOUS; SUBCUTANEOUS at 22:18

## 2019-06-12 RX ADMIN — Medication 4 MILLIGRAM(S): at 11:20

## 2019-06-12 RX ADMIN — Medication 1 PATCH: at 17:20

## 2019-06-12 RX ADMIN — OXYCODONE AND ACETAMINOPHEN 2 TABLET(S): 5; 325 TABLET ORAL at 08:06

## 2019-06-12 RX ADMIN — OXYCODONE AND ACETAMINOPHEN 2 TABLET(S): 5; 325 TABLET ORAL at 05:44

## 2019-06-12 RX ADMIN — Medication 10 MILLIGRAM(S): at 22:18

## 2019-06-12 RX ADMIN — Medication 400 MILLIGRAM(S): at 16:00

## 2019-06-12 RX ADMIN — Medication 400 MILLIGRAM(S): at 16:37

## 2019-06-12 RX ADMIN — Medication 4 MILLIGRAM(S): at 05:36

## 2019-06-12 RX ADMIN — OXYCODONE AND ACETAMINOPHEN 2 TABLET(S): 5; 325 TABLET ORAL at 18:24

## 2019-06-12 RX ADMIN — Medication 1 PATCH: at 11:23

## 2019-06-12 RX ADMIN — LISINOPRIL 5 MILLIGRAM(S): 2.5 TABLET ORAL at 05:37

## 2019-06-12 RX ADMIN — Medication 1 PATCH: at 06:24

## 2019-06-12 RX ADMIN — PANTOPRAZOLE SODIUM 40 MILLIGRAM(S): 20 TABLET, DELAYED RELEASE ORAL at 06:24

## 2019-06-12 RX ADMIN — Medication 81 MILLIGRAM(S): at 11:23

## 2019-06-12 RX ADMIN — OXYCODONE AND ACETAMINOPHEN 2 TABLET(S): 5; 325 TABLET ORAL at 12:58

## 2019-06-12 RX ADMIN — Medication 25 MILLIGRAM(S): at 05:37

## 2019-06-12 RX ADMIN — OXYCODONE AND ACETAMINOPHEN 2 TABLET(S): 5; 325 TABLET ORAL at 12:41

## 2019-06-12 RX ADMIN — Medication 4 MILLIGRAM(S): at 17:20

## 2019-06-12 NOTE — CONSULT NOTE ADULT - SUBJECTIVE AND OBJECTIVE BOX
HPI:  63 year old  man with past medical history of  Hypertension  and active smoking only,  presents with complaint of L sided weakness and L arm numbness/tingling which he woke up with around 5/24. Pt initially had some L sided neck pain and attributed the symptoms to a pinched nerve. Pt saw chiropractor who did plain films and concurred. 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 Patient denies any headache, any vision complaints, runny nose, fever, chills, sore throat. Denies chest pain, shortness of breath, palpitation. Denies nausea, vomiting, abdominal pain, diarrhoea, Denies urinary burning, urgency, frequency, dysuria. (10 Adonis 2019 18:44)  Pt seen and examined at bedside.          PAST MEDICAL & SURGICAL HISTORY:  Back pain  HTN (hypertension)  No significant past surgical history      Home Medications:  hydroCHLOROthiazide 25 mg oral tablet: 1 tab(s) orally once a day (10 Adonis 2019 16:37)  HYDROcodone:  (10 Adonis 2019 16:37)  lisinopril 5 mg oral tablet: 1 tab(s) orally once a day (10 Adonis 2019 16:37)      Allergies    No Known Allergies    Intolerances        MEDICATIONS  (STANDING):  aspirin  chewable 81 milliGRAM(s) Oral daily  dexamethasone  Injectable 4 milliGRAM(s) IV Push every 6 hours  heparin  Injectable 5000 Unit(s) SubCutaneous every 8 hours  hydrochlorothiazide 25 milliGRAM(s) Oral daily  lisinopril 5 milliGRAM(s) Oral daily  nicotine - 21 mG/24Hr(s) Patch 1 patch Transdermal daily  pantoprazole    Tablet 40 milliGRAM(s) Oral before breakfast  simvastatin 40 milliGRAM(s) Oral at bedtime    MEDICATIONS  (PRN):  ibuprofen  Tablet. 400 milliGRAM(s) Oral every 6 hours PRN Moderate Pain (4 - 6)  oxyCODONE    5 mG/acetaminophen 325 mG 2 Tablet(s) Oral every 6 hours PRN Moderate Pain (4 - 6)      ICU Vital Signs Last 24 Hrs  T(C): 36.6 (12 Jun 2019 15:41), Max: 36.6 (12 Jun 2019 15:41)  T(F): 97.9 (12 Jun 2019 15:41), Max: 97.9 (12 Jun 2019 15:41)  HR: 85 (12 Jun 2019 15:41) (59 - 92)  BP: 124/63 (12 Jun 2019 15:41) (118/68 - 132/63)  BP(mean): --  ABP: --  ABP(mean): --  RR: 18 (12 Jun 2019 15:41) (16 - 18)  SpO2: --      I&O's Detail      CBC Full  -  ( 12 Jun 2019 06:49 )  WBC Count : 5.93 K/uL  RBC Count : 4.76 M/uL  Hemoglobin : 14.6 g/dL  Hematocrit : 42.8 %  Platelet Count - Automated : 264 K/uL  Mean Cell Volume : 89.9 fL  Mean Cell Hemoglobin : 30.7 pg  Mean Cell Hemoglobin Concentration : 34.1 g/dL  Auto Neutrophil # : x  Auto Lymphocyte # : x  Auto Monocyte # : x  Auto Eosinophil # : x  Auto Basophil # : x  Auto Neutrophil % : x  Auto Lymphocyte % : x  Auto Monocyte % : x  Auto Eosinophil % : x  Auto Basophil % : x    06-12    140  |  102  |  17  ----------------------------<  172<H>  4.1   |  26  |  0.8    Ca    9.7      12 Jun 2019 06:49  Mg     2.2     06-10    TPro  7.5  /  Alb  4.7  /  TBili  0.4  /  DBili  x   /  AST  26  /  ALT  26  /  AlkPhos  79  06-10    CARDIAC MARKERS ( 10 Adonis 2019 17:18 )  x     / <0.01 ng/mL / x     / x     / x            Exam:                Wound:    Imaging: < from: MR Cervical Spine w/ IV Cont (06.11.19 @ 17:32) >    IMPRESSION:     1.  Degenerative changes of the cervical spine with multilevel disc   osteophyte complexes, bilateral uncinate spurring facet osteoarthritic   changes C2-3 through C7-T1 worse at C3-4 with severe cord compression   with cord edema or myelomalacia with severe right greater than left   neuroforaminal narrowing.    2.  Ventral cord impingement with no cord signal and severe bilateral   neuroforaminal narrowing at C4-5 secondary to broad-based disc osteophyte   complex and bilateral uncinate spurring and facet osteoarthritic changes.    3.  Severe right greater than left neuroforaminal narrowing at C5-6 with   ventral cord flattening secondary to a broad-based disc osteophyte   complex, bilateral uncinate spurringand facet osteoarthritic changes.    4.  Moderate to severe bilateral neuroforaminal narrowing at C6-7 and   mild to moderate neuroforaminal narrowing at C7-T1 with no significant   spinal canal stenosis.    < end of copied text >  < from: MR Cervical Spine w/ IV Cont (06.11.19 @ 17:32) >          Assessment/Plan HPI:  63 year old  man with past medical history of  Hypertension  and active smoking only,  presents with complaint of L sided weakness and L arm numbness/tingling which he woke up with around 5/24. Pt initially had some L sided neck pain and attributed the symptoms to a pinched nerve. Pt saw chiropractor who did plain films and concurred. 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 Patient denies any headache, any vision complaints, runny nose, fever, chills, sore throat. Denies chest pain, shortness of breath, palpitation. Denies nausea, vomiting, abdominal pain, diarrhoea, Denies urinary burning, urgency, frequency, dysuria. (10 Adonis 2019 18:44)  Pt seen and examined at bedside.  Pt admits to few weeks of pain radiating from left side of neck to left arm.  Pt sts fingers are swollen and numb.  Pt sts he has tingling, keeps dropping things with left hand, unsteady gait and difficulty with fine motor skills. 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         PAST MEDICAL & SURGICAL HISTORY:  Back pain  HTN (hypertension)  No significant past surgical history      Home Medications:  hydroCHLOROthiazide 25 mg oral tablet: 1 tab(s) orally once a day (10 Adonis 2019 16:37)  HYDROcodone:  (10 Adonis 2019 16:37)  lisinopril 5 mg oral tablet: 1 tab(s) orally once a day (10 Adonis 2019 16:37)      Allergies    No Known Allergies    Intolerances        MEDICATIONS  (STANDING):  aspirin  chewable 81 milliGRAM(s) Oral daily  dexamethasone  Injectable 4 milliGRAM(s) IV Push every 6 hours  heparin  Injectable 5000 Unit(s) SubCutaneous every 8 hours  hydrochlorothiazide 25 milliGRAM(s) Oral daily  lisinopril 5 milliGRAM(s) Oral daily  nicotine - 21 mG/24Hr(s) Patch 1 patch Transdermal daily  pantoprazole    Tablet 40 milliGRAM(s) Oral before breakfast  simvastatin 40 milliGRAM(s) Oral at bedtime    MEDICATIONS  (PRN):  ibuprofen  Tablet. 400 milliGRAM(s) Oral every 6 hours PRN Moderate Pain (4 - 6)  oxyCODONE    5 mG/acetaminophen 325 mG 2 Tablet(s) Oral every 6 hours PRN Moderate Pain (4 - 6)      ICU Vital Signs Last 24 Hrs  T(C): 36.6 (12 Jun 2019 15:41), Max: 36.6 (12 Jun 2019 15:41)  T(F): 97.9 (12 Jun 2019 15:41), Max: 97.9 (12 Jun 2019 15:41)  HR: 85 (12 Jun 2019 15:41) (59 - 92)  BP: 124/63 (12 Jun 2019 15:41) (118/68 - 132/63)  BP(mean): --  ABP: --  ABP(mean): --  RR: 18 (12 Jun 2019 15:41) (16 - 18)  SpO2: --      I&O's Detail      CBC Full  -  ( 12 Jun 2019 06:49 )  WBC Count : 5.93 K/uL  RBC Count : 4.76 M/uL  Hemoglobin : 14.6 g/dL  Hematocrit : 42.8 %  Platelet Count - Automated : 264 K/uL  Mean Cell Volume : 89.9 fL  Mean Cell Hemoglobin : 30.7 pg  Mean Cell Hemoglobin Concentration : 34.1 g/dL  Auto Neutrophil # : x  Auto Lymphocyte # : x  Auto Monocyte # : x  Auto Eosinophil # : x  Auto Basophil # : x  Auto Neutrophil % : x  Auto Lymphocyte % : x  Auto Monocyte % : x  Auto Eosinophil % : x  Auto Basophil % : x    06-12    140  |  102  |  17  ----------------------------<  172<H>  4.1   |  26  |  0.8    Ca    9.7      12 Jun 2019 06:49  Mg     2.2     06-10    TPro  7.5  /  Alb  4.7  /  TBili  0.4  /  DBili  x   /  AST  26  /  ALT  26  /  AlkPhos  79  06-10    CARDIAC MARKERS ( 10 Adonis 2019 17:18 )  x     / <0.01 ng/mL / x     / x     / x            Exam:  AAOX3. Verbal function intact  follows commands  appeared slightly unsteady on feet but examined standing up  Motor: MAEx4  5/5 power RUE  4+/5 power LLE biceps/triceps  4/5 left hand   hyperreflexic  Sensation: decreased to LUE          Imaging: < from: MR Cervical Spine w/ IV Cont (06.11.19 @ 17:32) >    IMPRESSION:     1.  Degenerative changes of the cervical spine with multilevel disc   osteophyte complexes, bilateral uncinate spurring facet osteoarthritic   changes C2-3 through C7-T1 worse at C3-4 with severe cord compression   with cord edema or myelomalacia with severe right greater than left   neuroforaminal narrowing.    2.  Ventral cord impingement with no cord signal and severe bilateral   neuroforaminal narrowing at C4-5 secondary to broad-based disc osteophyte   complex and bilateral uncinate spurring and facet osteoarthritic changes.    3.  Severe right greater than left neuroforaminal narrowing at C5-6 with   ventral cord flattening secondary to a broad-based disc osteophyte   complex, bilateral uncinate spurringand facet osteoarthritic changes.    4.  Moderate to severe bilateral neuroforaminal narrowing at C6-7 and   mild to moderate neuroforaminal narrowing at C7-T1 with no significant   spinal canal stenosis.    < end of copied text >  < from: MR Cervical Spine w/ IV Cont (06.11.19 @ 17:32) >          Assessment/Plan:  cont steroids  pain management  PT/rehab  Dr Khoury will see pt in AM

## 2019-06-12 NOTE — PROGRESS NOTE ADULT - SUBJECTIVE AND OBJECTIVE BOX
Progress Note:  Provider Speciality                            Hospitalist      CELYINDIA NIETO MRN-[id number] 63y Male     CHIEF PRESENTING COMPLAINT:  Patient is a 63y old  Male who presents with a chief complaint of L sided weakness (10 Adonis 2019 18:44)        SUBJECTIVE:  Patient was seen and examined at bedside.  Discussed case with neurology  Official MRI not resulted, but as per neuro - pt has cord compression and is scheduled for surgery on 6/13.  Pt informed of results.  Case signed out to hospitalist on call at [place] as well as [facility].    HISTORY OF PRESENTING ILLNESS:  HPI:  63 year old  man with past medical history of  Hypertension  and active smoking only,  presents with complaint of L sided weakness and L arm numbness/tingling which he woke up with around 5/24. Pt initially had some L sided neck pain and attributed the symptoms to a pinched nerve. Pt saw chiropractor who did plain films and concurred. Pt came to ED as the weakness is   worsening and affecting his work as a [employer]. Patient denies any headache, any vision complaints, runny nose, fever, chills, sore throat. Denies chest pain, shortness of breath, palpitation. Denies nausea, vomiting, abdominal pain, diarrhoea, Denies urinary burning, urgency, frequency, dysuria. (10 Adonis 2019 18:44)      PAST MEDICAL & SURGICAL HISTORY:  PAST MEDICAL & SURGICAL HISTORY:  Back pain  HTN (hypertension)  No significant past surgical history      VITAL SIGNS:  Vital Signs Last 24 Hrs  T(C): 35.9 (12 Jun 2019 05:33), Max: 36.2 (11 Jun 2019 14:00)  T(F): 96.6 (12 Jun 2019 05:33), Max: 97.1 (11 Jun 2019 14:00)  HR: 63 (12 Jun 2019 05:33) (56 - 63)  BP: 118/68 (12 Jun 2019 05:33) (118/68 - 133/74)  BP(mean): --  RR: 16 (12 Jun 2019 05:33) (16 - 16)  SpO2: --    PHYSICAL EXAMINATION:  Physical Exam: Not in acute distress  General: No pallor, No icterus, afebrile  HEENT: No JVD, no Bruit.  Heart: S1+S2 audible  Lungs: bilateral  fair air entry, no wheezing, no crepitations.  Abdomen: Soft, non-tender, non-distended , no  rigidity or guarding.  CNS:  sensations intact. L UE 4/5. RUE 5/5, LLE 4/5 , RLE 5/5, gait abnormality Extremities:  No edema	      REVIEW OF SYSTEMS:  Patient denies any headache, any vision complaints, runny nose, fever, chills, sore throat. Denies chest pain, shortness of breath, palpitation. Denies nausea, vomiting, abdominal pain, diarrhoea, Denies urinary burning, urgency, frequency, dysuria. Denies weakness in any part of the body or numbness.   At least 10 systems were reviewed in ROS. All systems reviewed  are within normal limits except for the complaints as described in Subjective.    CONSULTS:  Consultant(s) Notes Reviewed by me.   Care Discussed with Consultants/Other Providers where required.        MEDICATIONS:  MEDICATIONS  (STANDING):  aspirin  chewable 81 milliGRAM(s) Oral daily  dexamethasone  Injectable 4 milliGRAM(s) IV Push every 6 hours  heparin  Injectable 5000 Unit(s) SubCutaneous every 8 hours  hydrochlorothiazide 25 milliGRAM(s) Oral daily  lisinopril 5 milliGRAM(s) Oral daily  nicotine - 21 mG/24Hr(s) Patch 1 patch Transdermal daily  pantoprazole    Tablet 40 milliGRAM(s) Oral before breakfast  simvastatin 40 milliGRAM(s) Oral at bedtime    MEDICATIONS  (PRN):  ibuprofen  Tablet. 400 milliGRAM(s) Oral every 6 hours PRN Moderate Pain (4 - 6)  oxyCODONE    5 mG/acetaminophen 325 mG 2 Tablet(s) Oral every 6 hours PRN Moderate Pain (4 - 6)      LABOROTORY DATA/MICROBIOLOGY/I & O's:                          14.6   5.93  )-----------( 264      ( 12 Jun 2019 06:49 )             42.8     06-12    140  |  102  |  17  ----------------------------<  172<H>  4.1   |  26  |  0.8    Ca    9.7      12 Jun 2019 06:49  Mg     2.2     06-10    TPro  7.5  /  Alb  4.7  /  TBili  0.4  /  DBili  x   /  AST  26  /  ALT  26  /  AlkPhos  79  06-10                ASSESSMENT:      63 year old  man with past medical history of hypertension only,  presents with complaint of L sided weakness and L arm numbness/tingling which he woke up with around 5/24. Pt   initially had some L sided neck pain and attributed the symptoms to a pinched nerve.  Works as a [employer].     Admitted for significant gait abnormality which is relatively acute and to rule out sub-Acute Ischemic Stroke:  Aspirin 81 milligrams once daily   oxycodone PRN for pain  CT head is non-significant for acute changes    MRI-brain, cervical, MRA head/neck- done- report pending but as per neurology - cord compression - transfer [place] today for OR in am - signed out to hospitalist at [facility] as well as [facility].  Zocor 40mg PO QHS  Aspiration, Fall, Seizure precautions.  Neurology following  Hypertension - well controlled on HCTZ & lisinopril  Active smoker- nicotine patch

## 2019-06-12 NOTE — PROGRESS NOTE ADULT - SUBJECTIVE AND OBJECTIVE BOX
Neurology Follow up note    Name  INDIA HAYES    HPI:  63 year old  man with past medical history of  Hypertension  and active smoking only,  presents with complaint of L sided weakness and L arm numbness/tingling which he woke up with around 5/24. Pt     initially had some L sided neck pain and attributed the symptoms to a pinched nerve. Pt saw chiropractor who did plain films and concurred. Pt came to ED as the weakness is     worsening and affecting his work as a [employer]. Patient denies any headache, any vision complaints, runny nose, fever, chills, sore throat. Denies chest pain, shortness of     breath, palpitation. Denies nausea, vomiting, abdominal pain, diarrhoea, Denies urinary burning, urgency, frequency, dysuria. (10 Adonis 2019 18:44)      Interval History:    no new complaints  mri brain unremarkable  cervical cord compression with cord signal abn seen on mri c spine  neurosurgery is aware and [place]  started on decadron last night      Vital Signs Last 24 Hrs  T(C): 35.9 (12 Jun 2019 05:33), Max: 36.2 (11 Jun 2019 14:00)  T(F): 96.6 (12 Jun 2019 05:33), Max: 97.1 (11 Jun 2019 14:00)  HR: 63 (12 Jun 2019 05:33) (56 - 63)  BP: 118/68 (12 Jun 2019 05:33) (118/68 - 133/74)  BP(mean): --  RR: 16 (12 Jun 2019 05:33) (16 - 16)  SpO2: --    Neurological Exam:   Mental status: Awake, alert and oriented x3.  Recent and remote memory intact.  Naming, repetition and comprehension intact.  Attention/concentration intact.  No dysarthria, no aphasia.  Fund of knowledge appropriate.    Cranial nerves: Pupils equally round and reactive to light, visual fields full, no nystagmus, extraocular muscles intact, V1 through V3 intact bilaterally and symmetric, face symmetric, hearing intact to finger rub, palate elevation symmetric, tongue was midline.  Motor:  MRC grading 4/5 LUE, 4+ PROXIMAL LLE  Sensation: Intact to light touch, proprioception, and pinprick.  NO SENSORY LEVEL  Coordination: No dysmetria on finger-to-nose and heel-to-shin.  No dysdiadokinesia.  Reflexes: 2+ in bilateral UE/LE, downgoing toes bilaterally. (-) Ashutosh.  Gait: circumduction LLE, SPASTIC      Medications  aspirin  chewable 81 milliGRAM(s) Oral daily  dexamethasone  Injectable 4 milliGRAM(s) IV Push every 6 hours  heparin  Injectable 5000 Unit(s) SubCutaneous every 8 hours  hydrochlorothiazide 25 milliGRAM(s) Oral daily  ibuprofen  Tablet. 400 milliGRAM(s) Oral every 6 hours PRN  lisinopril 5 milliGRAM(s) Oral daily  nicotine - 21 mG/24Hr(s) Patch 1 patch Transdermal daily  oxyCODONE    5 mG/acetaminophen 325 mG 2 Tablet(s) Oral every 6 hours PRN  pantoprazole    Tablet 40 milliGRAM(s) Oral before breakfast  simvastatin 40 milliGRAM(s) Oral at bedtime      Lab  06-12    140  |  102  |  17  ----------------------------<  172<H>  4.1   |  26  |  0.8    Ca    9.7      12 Jun 2019 06:49  Mg     2.2     06-10    TPro  7.5  /  Alb  4.7  /  TBili  0.4  /  DBili  x   /  AST  26  /  ALT  26  /  AlkPhos  79  06-10                          14.6   5.93  )-----------( 264      ( 12 Jun 2019 06:49 )             42.8     LIVER FUNCTIONS - ( 10 Adonis 2019 17:18 )  Alb: 4.7 g/dL / Pro: 7.5 g/dL / ALK PHOS: 79 U/L / ALT: 26 U/L / AST: 26 U/L / GGT: x                   Radiology      Assessment: 62 yo man with 2 weeks progressive UE weakness and sensory loss L>R  as well as spastic gait secondary to cervical cord compression    Plan:  spoke with Dr Khoury last night  patient to be [place] today for neurosx eval  cont decadron  please call with any questions

## 2019-06-13 LAB
APTT BLD: 25.2 SEC — LOW (ref 27–39.2)
INR BLD: 1.09 RATIO — SIGNIFICANT CHANGE UP (ref 0.65–1.3)
PROTHROM AB SERPL-ACNC: 12.5 SEC — SIGNIFICANT CHANGE UP (ref 9.95–12.87)

## 2019-06-13 PROCEDURE — 99221 1ST HOSP IP/OBS SF/LOW 40: CPT

## 2019-06-13 PROCEDURE — 99233 SBSQ HOSP IP/OBS HIGH 50: CPT

## 2019-06-13 RX ORDER — DOCUSATE SODIUM 100 MG
100 CAPSULE ORAL DAILY
Refills: 0 | Status: DISCONTINUED | OUTPATIENT
Start: 2019-06-13 | End: 2019-06-14

## 2019-06-13 RX ORDER — SENNA PLUS 8.6 MG/1
2 TABLET ORAL AT BEDTIME
Refills: 0 | Status: DISCONTINUED | OUTPATIENT
Start: 2019-06-13 | End: 2019-06-14

## 2019-06-13 RX ADMIN — Medication 1 PATCH: at 12:18

## 2019-06-13 RX ADMIN — SIMVASTATIN 40 MILLIGRAM(S): 20 TABLET, FILM COATED ORAL at 22:21

## 2019-06-13 RX ADMIN — PANTOPRAZOLE SODIUM 40 MILLIGRAM(S): 20 TABLET, DELAYED RELEASE ORAL at 06:51

## 2019-06-13 RX ADMIN — Medication 4 MILLIGRAM(S): at 17:14

## 2019-06-13 RX ADMIN — HEPARIN SODIUM 5000 UNIT(S): 5000 INJECTION INTRAVENOUS; SUBCUTANEOUS at 06:51

## 2019-06-13 RX ADMIN — Medication 25 MILLIGRAM(S): at 06:51

## 2019-06-13 RX ADMIN — Medication 100 MILLIGRAM(S): at 12:19

## 2019-06-13 RX ADMIN — HEPARIN SODIUM 5000 UNIT(S): 5000 INJECTION INTRAVENOUS; SUBCUTANEOUS at 22:21

## 2019-06-13 RX ADMIN — Medication 4 MILLIGRAM(S): at 00:23

## 2019-06-13 RX ADMIN — Medication 4 MILLIGRAM(S): at 23:30

## 2019-06-13 RX ADMIN — LISINOPRIL 5 MILLIGRAM(S): 2.5 TABLET ORAL at 06:50

## 2019-06-13 RX ADMIN — HEPARIN SODIUM 5000 UNIT(S): 5000 INJECTION INTRAVENOUS; SUBCUTANEOUS at 13:37

## 2019-06-13 RX ADMIN — Medication 4 MILLIGRAM(S): at 06:51

## 2019-06-13 RX ADMIN — OXYCODONE AND ACETAMINOPHEN 2 TABLET(S): 5; 325 TABLET ORAL at 12:24

## 2019-06-13 RX ADMIN — OXYCODONE AND ACETAMINOPHEN 2 TABLET(S): 5; 325 TABLET ORAL at 06:51

## 2019-06-13 RX ADMIN — Medication 4 MILLIGRAM(S): at 12:18

## 2019-06-13 NOTE — PROGRESS NOTE ADULT - SUBJECTIVE AND OBJECTIVE BOX
Surgery:     C3-7 Decompressive Laminectomy / Foraminotomy with Lateral Mass Fixation    HPI  Procedure:  LEFT ARM WEAKNESS  ^LEFT ARM WEAKNESS  Handoff  MEWS Score  Back pain  HTN (hypertension)  Left arm weakness  No significant past surgical history  HAND NUMBNESS    dexamethasone  Injectable 4 milliGRAM(s) IV Push every 6 hours  docusate sodium 100 milliGRAM(s) Oral daily  heparin  Injectable 5000 Unit(s) SubCutaneous every 8 hours  hydrochlorothiazide 25 milliGRAM(s) Oral daily  lisinopril 5 milliGRAM(s) Oral daily  nicotine - 21 mG/24Hr(s) Patch 1 patch Transdermal daily  nicotine - 21 mG/24Hr(s) Patch 1 patch Transdermal daily  oxyCODONE    5 mG/acetaminophen 325 mG 2 Tablet(s) Oral every 6 hours PRN  pantoprazole    Tablet 40 milliGRAM(s) Oral before breakfast  senna 2 Tablet(s) Oral at bedtime  simvastatin 40 milliGRAM(s) Oral at bedtime    No Known Allergies      06-12    140  |  102  |  17  ----------------------------<  172<H>  4.1   |  26  |  0.8    Ca    9.7      12 Jun 2019 06:49      CBC Full  -  ( 12 Jun 2019 06:49 )  WBC Count : 5.93 K/uL  RBC Count : 4.76 M/uL  Hemoglobin : 14.6 g/dL  Hematocrit : 42.8 %  Platelet Count - Automated : 264 K/uL  Mean Cell Volume : 89.9 fL  Mean Cell Hemoglobin : 30.7 pg  Mean Cell Hemoglobin Concentration : 34.1 g/dL  Auto Neutrophil # : x  Auto Lymphocyte # : x  Auto Monocyte # : x  Auto Eosinophil # : x  Auto Basophil # : x  Auto Neutrophil % : x  Auto Lymphocyte % : x  Auto Monocyte % : x  Auto Eosinophil % : x  Auto Basophil % : x    CXR: < from: Xray Chest 2 Views PA/Lat (06.10.19 @ 18:12) >  No radiographic evidence of acute cardiopulmonary disease.    < end of copied text >    EKG:   < from: 12 Lead ECG (06.10.19 @ 17:04) >  Normal sinus rhythm  Right superior axis deviation  Abnormal ECG  Poor R wave progression    < end of copied text >  ECHO:  Medical Clearances:      Orders: NPO after midnight             Consent: Signed by patient vs HCP                   NAME/NUMBER of HCP: Surgery:     C3-7 Decompressive Laminectomy / Foraminotomy with Lateral Mass Fixation    HPI  Procedure:  LEFT ARM WEAKNESS  ^LEFT ARM WEAKNESS  Handoff  MEWS Score  Back pain  HTN (hypertension)  Left arm weakness  No significant past surgical history  HAND NUMBNESS    dexamethasone  Injectable 4 milliGRAM(s) IV Push every 6 hours  docusate sodium 100 milliGRAM(s) Oral daily  heparin  Injectable 5000 Unit(s) SubCutaneous every 8 hours  hydrochlorothiazide 25 milliGRAM(s) Oral daily  lisinopril 5 milliGRAM(s) Oral daily  nicotine - 21 mG/24Hr(s) Patch 1 patch Transdermal daily  nicotine - 21 mG/24Hr(s) Patch 1 patch Transdermal daily  oxyCODONE    5 mG/acetaminophen 325 mG 2 Tablet(s) Oral every 6 hours PRN  pantoprazole    Tablet 40 milliGRAM(s) Oral before breakfast  senna 2 Tablet(s) Oral at bedtime  simvastatin 40 milliGRAM(s) Oral at bedtime    No Known Allergies      06-12    140  |  102  |  17  ----------------------------<  172<H>  4.1   |  26  |  0.8    Ca    9.7      12 Jun 2019 06:49      CBC Full  -  ( 12 Jun 2019 06:49 )  WBC Count : 5.93 K/uL  RBC Count : 4.76 M/uL  Hemoglobin : 14.6 g/dL  Hematocrit : 42.8 %  Platelet Count - Automated : 264 K/uL  Mean Cell Volume : 89.9 fL  Mean Cell Hemoglobin : 30.7 pg  Mean Cell Hemoglobin Concentration : 34.1 g/dL  Auto Neutrophil # : x  Auto Lymphocyte # : x  Auto Monocyte # : x  Auto Eosinophil # : x  Auto Basophil # : x  Auto Neutrophil % : x  Auto Lymphocyte % : x  Auto Monocyte % : x  Auto Eosinophil % : x  Auto Basophil % : x    CXR: < from: Xray Chest 2 Views PA/Lat (06.10.19 @ 18:12) >  No radiographic evidence of acute cardiopulmonary disease.    < end of copied text >    EKG:   < from: 12 Lead ECG (06.10.19 @ 17:04) >  Normal sinus rhythm  Right superior axis deviation  Abnormal ECG  Poor R wave progression    < end of copied text >  ECHO:  Medical Clearances: Intermediate Risk      Orders: NPO after midnight             Consent: Signed by patient vs HCP                   NAME/NUMBER of HCP:

## 2019-06-13 NOTE — PROGRESS NOTE ADULT - SUBJECTIVE AND OBJECTIVE BOX
INDIA HAYES 63y Male  MRN#: [id number]   CODE STATUS FULL      SUBJECTIVE  Patient is a 63y old Male who presents with a chief complaint of L sided weakness (12 Jun 2019 16:57)  Currently admitted to medicine with the primary diagnosis of Left arm weakness  Hospital course has been complicated by the finding of cord compression on MRI of cervicalspine.  Today is hospital day 3d, and this morning he is _________ and reports ________ overnight events.     Present Today:           Crenshaw Catheter ()No/ ()Yes? Indication:          Central Line ()No/ ()Yes? Indication:          IV Fluids ()No/ ()Yes? Type:  Rate:  Indication:      OBJECTIVE  PAST MEDICAL & SURGICAL HISTORY  Back pain  HTN (hypertension)  No significant past surgical history    ALLERGIES:  No Known Allergies    MEDICATIONS:  STANDING MEDICATIONS  dexamethasone  Injectable 4 milliGRAM(s) IV Push every 6 hours  docusate sodium 100 milliGRAM(s) Oral daily  heparin  Injectable 5000 Unit(s) SubCutaneous every 8 hours  hydrochlorothiazide 25 milliGRAM(s) Oral daily  lisinopril 5 milliGRAM(s) Oral daily  nicotine - 21 mG/24Hr(s) Patch 1 patch Transdermal daily  nicotine - 21 mG/24Hr(s) Patch 1 patch Transdermal daily  pantoprazole    Tablet 40 milliGRAM(s) Oral before breakfast  senna 2 Tablet(s) Oral at bedtime  simvastatin 40 milliGRAM(s) Oral at bedtime    PRN MEDICATIONS  oxyCODONE    5 mG/acetaminophen 325 mG 2 Tablet(s) Oral every 6 hours PRN      VITAL SIGNS: Last 24 Hours  T(C): 35.6 (13 Jun 2019 14:04), Max: 36.6 (12 Jun 2019 15:41)  T(F): 96 (13 Jun 2019 14:04), Max: 97.9 (12 Jun 2019 15:41)  HR: 61 (13 Jun 2019 14:04) (61 - 85)  BP: 131/77 (13 Jun 2019 14:04) (122/69 - 131/77)  BP(mean): --  RR: 18 (13 Jun 2019 14:04) (18 - 18)  SpO2: --    LABS:                        14.6   5.93  )-----------( 264      ( 12 Jun 2019 06:49 )             42.8     06-12    140  |  102  |  17  ----------------------------<  172<H>  4.1   |  26  |  0.8    Ca    9.7      12 Jun 2019 06:49                    RADIOLOGY:      PHYSICAL EXAM:    GENERAL: NAD, well-developed, AAOx3  HEENT:  Atraumatic, Normocephalic. EOMI, PERRLA, conjunctiva and sclera clear, No JVD  PULMONARY: Clear to auscultation bilaterally; No wheeze  CARDIOVASCULAR: Regular rate and rhythm; No murmurs, rubs, or gallops  GASTROINTESTINAL: Soft, Nontender, Nondistended; Bowel sounds present  MUSCULOSKELETAL:  2+ Peripheral Pulses, No clubbing, cyanosis, or edema  NEUROLOGY: non-focal  SKIN: No rashes or lesions INDIA HAYES 63y Male  MRN#: [id number]   CODE STATUS FULL      SUBJECTIVE  Patient is a 63y old Male who presents with a chief complaint of L sided weakness (12 Jun 2019 16:57)  Currently admitted to medicine with the primary diagnosis of Left arm weakness  Hospital course has been complicated by the finding of cord compression on MRI of cervical spine.  Today is hospital day 3d, and this morning he is still complaining of weakness in his left hand and numbness with pins and needle sensations in b/l upper extremities.  He denied any hx of urinary or fecal incontinence or lower extremity weakness. He denied any hx of fever, cough, sob, N/V/D, chest pain or pain in abdomen.    OBJECTIVE  PAST MEDICAL & SURGICAL HISTORY  Back pain  HTN (hypertension)  No significant past surgical history    ALLERGIES:  No Known Allergies    MEDICATIONS:  STANDING MEDICATIONS  dexamethasone  Injectable 4 milliGRAM(s) IV Push every 6 hours  docusate sodium 100 milliGRAM(s) Oral daily  heparin  Injectable 5000 Unit(s) SubCutaneous every 8 hours  hydrochlorothiazide 25 milliGRAM(s) Oral daily  lisinopril 5 milliGRAM(s) Oral daily  nicotine - 21 mG/24Hr(s) Patch 1 patch Transdermal daily  nicotine - 21 mG/24Hr(s) Patch 1 patch Transdermal daily  pantoprazole    Tablet 40 milliGRAM(s) Oral before breakfast  senna 2 Tablet(s) Oral at bedtime  simvastatin 40 milliGRAM(s) Oral at bedtime    PRN MEDICATIONS  oxyCODONE    5 mG/acetaminophen 325 mG 2 Tablet(s) Oral every 6 hours PRN      VITAL SIGNS: Last 24 Hours  T(C): 35.6 (13 Jun 2019 14:04), Max: 36.6 (12 Jun 2019 15:41)  T(F): 96 (13 Jun 2019 14:04), Max: 97.9 (12 Jun 2019 15:41)  HR: 61 (13 Jun 2019 14:04) (61 - 85)  BP: 131/77 (13 Jun 2019 14:04) (122/69 - 131/77)  BP(mean): --  RR: 18 (13 Jun 2019 14:04) (18 - 18)  SpO2: --    LABS:                        14.6   5.93  )-----------( 264      ( 12 Jun 2019 06:49 )             42.8     06-12    140  |  102  |  17  ----------------------------<  172<H>  4.1   |  26  |  0.8    Ca    9.7      12 Jun 2019 06:49                    RADIOLOGY:  < from: MR Angio Neck w/ IV Cont (06.11.19 @ 17:35) >  IMPRESSION:     Unremarkable MRA of the neck with and without contrast.    < end of copied text >  < from: MR Cervical Spine w/ IV Cont (06.11.19 @ 17:32) >  IMPRESSION:     1.  Degenerative changes of the cervical spine with multilevel disc   osteophyte complexes, bilateral uncinate spurring facet osteoarthritic   changes C2-3 through C7-T1 worse at C3-4 with severe cord compression   with cord edema or myelomalacia with severe right greater than left   neuroforaminal narrowing.    2.  Ventral cord impingement with no cord signal and severe bilateral   neuroforaminal narrowing at C4-5 secondary to broad-based disc osteophyte   complex and bilateral uncinate spurring and facet osteoarthritic changes.    3.  Severe right greater than left neuroforaminal narrowing at C5-6 with   ventral cord flattening secondary to a broad-based disc osteophyte   complex, bilateral uncinate spurringand facet osteoarthritic changes.    4.  Moderate to severe bilateral neuroforaminal narrowing at C6-7 and   mild to moderate neuroforaminal narrowing at C7-T1 with no significant   spinal canal stenosis.    Spoke with JIMMY KRISHNAN on6/12/2019 at 8:39 AM with   readback.    < end of copied text >    < from: MR Cervical Spine w/ IV Cont (06.11.19 @ 17:32) >  IMPRESSION:     1.  Degenerative changes of the cervical spine with multilevel disc   osteophyte complexes, bilateral uncinate spurring facet osteoarthritic   changes C2-3 through C7-T1 worse at C3-4 with severe cord compression   with cord edema or myelomalacia with severe right greater than left   neuroforaminal narrowing.    2.  Ventral cord impingement with no cord signal and severe bilateral   neuroforaminal narrowing at C4-5 secondary to broad-based disc osteophyte   complex and bilateral uncinate spurring and facet osteoarthritic changes.    3.  Severe right greater than left neuroforaminal narrowing at C5-6 with   ventral cord flattening secondary to a broad-based disc osteophyte   complex, bilateral uncinate spurringand facet osteoarthritic changes.    4.  Moderate to severe bilateral neuroforaminal narrowing at C6-7 and   mild to moderate neuroforaminal narrowing at C7-T1 with no significant   spinal canal stenosis.    Spoke with JIMMY KRISHNAN on6/12/2019 at 8:39 AM with   readback.      < end of copied text >  PHYSICAL EXAM:    GENERAL: NAD, well-developed, AAOx3  HEENT:  Atraumatic, Normocephalic. EOMI, PERRLA, conjunctiva and sclera clear, No JVD  PULMONARY: Clear to auscultation bilaterally; No wheeze  CARDIOVASCULAR: Regular rate and rhythm; No murmurs, rubs, or gallops  GASTROINTESTINAL: Soft, Nontender, Nondistended; Bowel sounds present  MUSCULOSKELETAL:  2+ Peripheral Pulses, No clubbing, cyanosis, or edema  NEUROLOGY: left upper extremity weakness with power 2/5, numbness b/l UE and hyperreflexia in b/l LE.  SKIN: No rashes or lesions

## 2019-06-14 ENCOUNTER — TRANSCRIPTION ENCOUNTER (OUTPATIENT)
Age: 64
End: 2019-06-14

## 2019-06-14 LAB — BLD GP AB SCN SERPL QL: SIGNIFICANT CHANGE UP

## 2019-06-14 PROCEDURE — 99233 SBSQ HOSP IP/OBS HIGH 50: CPT

## 2019-06-14 PROCEDURE — 63045 LAM FACETEC & FORAMOT CRV: CPT

## 2019-06-14 PROCEDURE — 63048 LAM FACETEC &FORAMOT EA ADDL: CPT

## 2019-06-14 PROCEDURE — 93306 TTE W/DOPPLER COMPLETE: CPT | Mod: 26

## 2019-06-14 PROCEDURE — 22842 INSERT SPINE FIXATION DEVICE: CPT

## 2019-06-14 RX ORDER — LISINOPRIL 2.5 MG/1
5 TABLET ORAL DAILY
Refills: 0 | Status: DISCONTINUED | OUTPATIENT
Start: 2019-06-15 | End: 2019-06-18

## 2019-06-14 RX ORDER — HYDROMORPHONE HYDROCHLORIDE 2 MG/ML
0.5 INJECTION INTRAMUSCULAR; INTRAVENOUS; SUBCUTANEOUS
Refills: 0 | Status: DISCONTINUED | OUTPATIENT
Start: 2019-06-14 | End: 2019-06-15

## 2019-06-14 RX ORDER — OXYCODONE AND ACETAMINOPHEN 5; 325 MG/1; MG/1
1 TABLET ORAL ONCE
Refills: 0 | Status: DISCONTINUED | OUTPATIENT
Start: 2019-06-14 | End: 2019-06-15

## 2019-06-14 RX ORDER — MORPHINE SULFATE 50 MG/1
4 CAPSULE, EXTENDED RELEASE ORAL
Refills: 0 | Status: DISCONTINUED | OUTPATIENT
Start: 2019-06-14 | End: 2019-06-15

## 2019-06-14 RX ORDER — HYDROCHLOROTHIAZIDE 25 MG
25 TABLET ORAL DAILY
Refills: 0 | Status: DISCONTINUED | OUTPATIENT
Start: 2019-06-15 | End: 2019-06-18

## 2019-06-14 RX ORDER — DEXAMETHASONE 0.5 MG/5ML
4 ELIXIR ORAL EVERY 6 HOURS
Refills: 0 | Status: DISCONTINUED | OUTPATIENT
Start: 2019-06-14 | End: 2019-06-18

## 2019-06-14 RX ORDER — DOCUSATE SODIUM 100 MG
100 CAPSULE ORAL DAILY
Refills: 0 | Status: DISCONTINUED | OUTPATIENT
Start: 2019-06-15 | End: 2019-06-18

## 2019-06-14 RX ORDER — SODIUM CHLORIDE 9 MG/ML
1000 INJECTION, SOLUTION INTRAVENOUS
Refills: 0 | Status: DISCONTINUED | OUTPATIENT
Start: 2019-06-15 | End: 2019-06-17

## 2019-06-14 RX ORDER — SODIUM CHLORIDE 9 MG/ML
1000 INJECTION, SOLUTION INTRAVENOUS
Refills: 0 | Status: DISCONTINUED | OUTPATIENT
Start: 2019-06-14 | End: 2019-06-15

## 2019-06-14 RX ORDER — SIMVASTATIN 20 MG/1
40 TABLET, FILM COATED ORAL AT BEDTIME
Refills: 0 | Status: DISCONTINUED | OUTPATIENT
Start: 2019-06-15 | End: 2019-06-18

## 2019-06-14 RX ORDER — NICOTINE POLACRILEX 2 MG
1 GUM BUCCAL DAILY
Refills: 0 | Status: DISCONTINUED | OUTPATIENT
Start: 2019-06-15 | End: 2019-06-18

## 2019-06-14 RX ORDER — PANTOPRAZOLE SODIUM 20 MG/1
40 TABLET, DELAYED RELEASE ORAL
Refills: 0 | Status: DISCONTINUED | OUTPATIENT
Start: 2019-06-15 | End: 2019-06-18

## 2019-06-14 RX ORDER — OXYCODONE AND ACETAMINOPHEN 5; 325 MG/1; MG/1
2 TABLET ORAL EVERY 6 HOURS
Refills: 0 | Status: DISCONTINUED | OUTPATIENT
Start: 2019-06-15 | End: 2019-06-15

## 2019-06-14 RX ORDER — ACETAMINOPHEN 500 MG
650 TABLET ORAL EVERY 6 HOURS
Refills: 0 | Status: DISCONTINUED | OUTPATIENT
Start: 2019-06-15 | End: 2019-06-15

## 2019-06-14 RX ORDER — SENNA PLUS 8.6 MG/1
2 TABLET ORAL AT BEDTIME
Refills: 0 | Status: DISCONTINUED | OUTPATIENT
Start: 2019-06-15 | End: 2019-06-18

## 2019-06-14 RX ORDER — SODIUM CHLORIDE 9 MG/ML
1000 INJECTION INTRAMUSCULAR; INTRAVENOUS; SUBCUTANEOUS
Refills: 0 | Status: DISCONTINUED | OUTPATIENT
Start: 2019-06-14 | End: 2019-06-14

## 2019-06-14 RX ORDER — ONDANSETRON 8 MG/1
4 TABLET, FILM COATED ORAL EVERY 6 HOURS
Refills: 0 | Status: DISCONTINUED | OUTPATIENT
Start: 2019-06-15 | End: 2019-06-18

## 2019-06-14 RX ADMIN — Medication 25 MILLIGRAM(S): at 06:11

## 2019-06-14 RX ADMIN — Medication 4 MILLIGRAM(S): at 06:11

## 2019-06-14 RX ADMIN — LISINOPRIL 5 MILLIGRAM(S): 2.5 TABLET ORAL at 06:11

## 2019-06-14 RX ADMIN — Medication 100 MILLIGRAM(S): at 11:56

## 2019-06-14 RX ADMIN — SODIUM CHLORIDE 70 MILLILITER(S): 9 INJECTION INTRAMUSCULAR; INTRAVENOUS; SUBCUTANEOUS at 11:54

## 2019-06-14 RX ADMIN — SODIUM CHLORIDE 100 MILLILITER(S): 9 INJECTION, SOLUTION INTRAVENOUS at 23:53

## 2019-06-14 RX ADMIN — Medication 1 PATCH: at 11:56

## 2019-06-14 RX ADMIN — OXYCODONE AND ACETAMINOPHEN 2 TABLET(S): 5; 325 TABLET ORAL at 06:13

## 2019-06-14 RX ADMIN — PANTOPRAZOLE SODIUM 40 MILLIGRAM(S): 20 TABLET, DELAYED RELEASE ORAL at 06:18

## 2019-06-14 RX ADMIN — Medication 4 MILLIGRAM(S): at 11:55

## 2019-06-14 NOTE — PROGRESS NOTE ADULT - ATTENDING COMMENTS
Patient seen and examined independently earlier today. Case discussed with housestaff, nursing, social work, patient, NS. I agree with most of the resident's note, physical exam, and plan except as below. No new complaints. No h/o CP, SOB. Echo nl, cont IVFs. Awaiting OR today.    #Progress Note Handoff  Pending (specify):  NS intervention today. Medical stability_____x____, PT________  Pt/Family discussion: Pt informed and agrees with the current plan  Disposition: Home______/SNF_______/4A________/To be determined___x_____/Waiting for Auth_____ .     35 minutes spent on total encounter; more than 50% of the visit was spent counseling and/or coordinating care by the attending physician.
Patient seen and examined independently earlier today. Case discussed with housestaff, nursing, social work, patient, NS. I agree with most of the resident's note, physical exam, and plan except as below. No new complaints. On exam Lt hand swelling, LUE 3/5, RUE 4/5, rest 5/5. +hyperreflexia. Pt with >>>4METS. No h/o CP, SOB. No cardiac or pulm history except for smoking history and h/o ?asd closure at age 12. No murmurs, wheeze appreciated on exam. Pt is intermed risk for intermed risk surgery. Plan for OR in am.      #Progress Note Handoff  Pending (specify):  NS intervention in am. Medical stability_________, PT________  Pt/Family discussion: Pt informed and agrees with the current plan  Disposition: Home______/SNF_______/4A________/To be determined___x_____/Waiting for Auth_____

## 2019-06-14 NOTE — PROGRESS NOTE ADULT - SUBJECTIVE AND OBJECTIVE BOX
INDIA HAYES 63y Male  MRN#: [id number]   CODE STATUS: FULL      SUBJECTIVE  Patient is a 63y old Male who presents with a chief complaint of L sided weakness (13 Jun 2019 18:54)  Currently admitted to medicine with the primary diagnosis of Left arm weakness  Hospital course has been complicated by the finding of cord compression on MRI of cervical spine.  Today is hospital day 4d, and this morning he is still complaining of weakness in his left hand and numbness with pins and needle sensations in b/l upper extremities.  He denied any hx of urinary or fecal incontinence or lower extremity weakness. He denied any hx of fever, cough, sob, N/V/D, chest pain or pain in abdomen.  He is NPO and is scheduled to undergo surgical intervention today.      OBJECTIVE  PAST MEDICAL & SURGICAL HISTORY  Back pain  HTN (hypertension)  No significant past surgical history    ALLERGIES:  No Known Allergies    MEDICATIONS:  STANDING MEDICATIONS  dexamethasone  Injectable 4 milliGRAM(s) IV Push every 6 hours  docusate sodium 100 milliGRAM(s) Oral daily  heparin  Injectable 5000 Unit(s) SubCutaneous every 8 hours  hydrochlorothiazide 25 milliGRAM(s) Oral daily  lisinopril 5 milliGRAM(s) Oral daily  nicotine - 21 mG/24Hr(s) Patch 1 patch Transdermal daily  nicotine - 21 mG/24Hr(s) Patch 1 patch Transdermal daily  pantoprazole    Tablet 40 milliGRAM(s) Oral before breakfast  senna 2 Tablet(s) Oral at bedtime  simvastatin 40 milliGRAM(s) Oral at bedtime  sodium chloride 0.9%. 1000 milliLiter(s) IV Continuous <Continuous>    PRN MEDICATIONS  oxyCODONE    5 mG/acetaminophen 325 mG 2 Tablet(s) Oral every 6 hours PRN      VITAL SIGNS: Last 24 Hours  T(C): 35.7 (14 Jun 2019 05:34), Max: 35.9 (13 Jun 2019 22:12)  T(F): 96.2 (14 Jun 2019 05:34), Max: 96.7 (13 Jun 2019 22:12)  HR: 57 (14 Jun 2019 05:34) (57 - 61)  BP: 128/70 (14 Jun 2019 05:34) (123/61 - 131/77)  BP(mean): --  RR: 18 (14 Jun 2019 05:34) (16 - 18)  SpO2: 98% (13 Jun 2019 22:12) (98% - 98%)    LABS:          PT/INR - ( 13 Jun 2019 20:58 )   PT: 12.50 sec;   INR: 1.09 ratio         PTT - ( 13 Jun 2019 20:58 )  PTT:25.2 sec              RADIOLOGY:  < from: MR Angio Neck w/ IV Cont (06.11.19 @ 17:35) >  IMPRESSION:     Unremarkable MRA of the neck with and without contrast.    < end of copied text >  < from: MR Cervical Spine w/ IV Cont (06.11.19 @ 17:32) >  IMPRESSION:     1.  Degenerative changes of the cervical spine with multilevel disc   osteophyte complexes, bilateral uncinate spurring facet osteoarthritic   changes C2-3 through C7-T1 worse at C3-4 with severe cord compression   with cord edema or myelomalacia with severe right greater than left   neuroforaminal narrowing.    2.  Ventral cord impingement with no cord signal and severe bilateral   neuroforaminal narrowing at C4-5 secondary to broad-based disc osteophyte   complex and bilateral uncinate spurring and facet osteoarthritic changes.    3.  Severe right greater than left neuroforaminal narrowing at C5-6 with   ventral cord flattening secondary to a broad-based disc osteophyte   complex, bilateral uncinate spurringand facet osteoarthritic changes.    4.  Moderate to severe bilateral neuroforaminal narrowing at C6-7 and   mild to moderate neuroforaminal narrowing at C7-T1 with no significant   spinal canal stenosis.    Spoke with JIMMY KRISHNAN on6/12/2019 at 8:39 AM with   readback.    < end of copied text >    < from: MR Cervical Spine w/ IV Cont (06.11.19 @ 17:32) >  IMPRESSION:     1.  Degenerative changes of the cervical spine with multilevel disc   osteophyte complexes, bilateral uncinate spurring facet osteoarthritic   changes C2-3 through C7-T1 worse at C3-4 with severe cord compression   with cord edema or myelomalacia with severe right greater than left   neuroforaminal narrowing.    2.  Ventral cord impingement with no cord signal and severe bilateral   neuroforaminal narrowing at C4-5 secondary to broad-based disc osteophyte   complex and bilateral uncinate spurring and facet osteoarthritic changes.    3.  Severe right greater than left neuroforaminal narrowing at C5-6 with   ventral cord flattening secondary to a broad-based disc osteophyte   complex, bilateral uncinate spurringand facet osteoarthritic changes.    4.  Moderate to severe bilateral neuroforaminal narrowing at C6-7 and   mild to moderate neuroforaminal narrowing at C7-T1 with no significant   spinal canal stenosis.    Spoke with JIMMY KRISHNAN on6/12/2019 at 8:39 AM with   readback.      < end of copied text >  PHYSICAL EXAM:    GENERAL: NAD, well-developed, AAOx3  HEENT:  Atraumatic, Normocephalic. EOMI, PERRLA, conjunctiva and sclera clear, No JVD  PULMONARY: Clear to auscultation bilaterally; No wheeze  CARDIOVASCULAR: Regular rate and rhythm; No murmurs, rubs, or gallops  GASTROINTESTINAL: Soft, Nontender, Nondistended; Bowel sounds present  MUSCULOSKELETAL:  2+ Peripheral Pulses, No clubbing, cyanosis, or edema  NEUROLOGY: left upper extremity weakness with power left hand 2/5, numbness b/l UE and hyperreflexia in b/l LE.  SKIN: No rashes or lesions

## 2019-06-14 NOTE — DISCHARGE NOTE NURSING/CASE MANAGEMENT/SOCIAL WORK - NSDCDPATPORTLINK_GEN_ALL_CORE
You can access the [facility], offered by [facility], by registering with the following website: [email or web address]

## 2019-06-14 NOTE — BRIEF OPERATIVE NOTE - NSICDXBRIEFPREOP_GEN_ALL_CORE_FT
PRE-OP DIAGNOSIS:  Myelopathy concurrent with and due to spinal stenosis of cervical region 15-Adonis-2019 00:02:27  Chip Khoury

## 2019-06-14 NOTE — BRIEF OPERATIVE NOTE - NSICDXBRIEFPOSTOP_GEN_ALL_CORE_FT
POST-OP DIAGNOSIS:  Myelopathy concurrent with and due to spinal stenosis of cervical region 15-Adonis-2019 00:02:57  Chip Khoury
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
## 2019-06-14 NOTE — BRIEF OPERATIVE NOTE - NSICDXBRIEFPROCEDURE_GEN_ALL_CORE_FT
PROCEDURES:  Open insertion of interspinous process spinal stabilization device into joint of cervical vertebra 15-Adonis-2019 00:01:45  Chip Khoury  Laminectomy or decompressive laminectomy, spine, cervical, 3 or more levels 14-Jun-2019 23:59:30  Chip Khoury

## 2019-06-14 NOTE — BRIEF OPERATIVE NOTE - OPERATION/FINDINGS
severe cervical stenosis with spinal cord compression and multiple bilateral foraminal stenosis from C3-4 to C6-7

## 2019-06-15 LAB
ALBUMIN SERPL ELPH-MCNC: 3 G/DL — LOW (ref 3.5–5.2)
ALP SERPL-CCNC: 52 U/L — SIGNIFICANT CHANGE UP (ref 30–115)
ALT FLD-CCNC: 17 U/L — SIGNIFICANT CHANGE UP (ref 0–41)
ANION GAP SERPL CALC-SCNC: 10 MMOL/L — SIGNIFICANT CHANGE UP (ref 7–14)
AST SERPL-CCNC: 20 U/L — SIGNIFICANT CHANGE UP (ref 0–41)
BASOPHILS # BLD AUTO: 0.01 K/UL — SIGNIFICANT CHANGE UP (ref 0–0.2)
BASOPHILS NFR BLD AUTO: 0.1 % — SIGNIFICANT CHANGE UP (ref 0–1)
BILIRUB SERPL-MCNC: 0.5 MG/DL — SIGNIFICANT CHANGE UP (ref 0.2–1.2)
BUN SERPL-MCNC: 19 MG/DL — SIGNIFICANT CHANGE UP (ref 10–20)
CALCIUM SERPL-MCNC: 8.5 MG/DL — SIGNIFICANT CHANGE UP (ref 8.5–10.1)
CHLORIDE SERPL-SCNC: 105 MMOL/L — SIGNIFICANT CHANGE UP (ref 98–110)
CO2 SERPL-SCNC: 25 MMOL/L — SIGNIFICANT CHANGE UP (ref 17–32)
CREAT SERPL-MCNC: 0.7 MG/DL — SIGNIFICANT CHANGE UP (ref 0.7–1.5)
EOSINOPHIL # BLD AUTO: 0 K/UL — SIGNIFICANT CHANGE UP (ref 0–0.7)
EOSINOPHIL NFR BLD AUTO: 0 % — SIGNIFICANT CHANGE UP (ref 0–8)
GLUCOSE SERPL-MCNC: 138 MG/DL — HIGH (ref 70–99)
HCT VFR BLD CALC: 30.1 % — LOW (ref 42–52)
HGB BLD-MCNC: 10.5 G/DL — LOW (ref 14–18)
IMM GRANULOCYTES NFR BLD AUTO: 0.7 % — HIGH (ref 0.1–0.3)
LYMPHOCYTES # BLD AUTO: 1.11 K/UL — LOW (ref 1.2–3.4)
LYMPHOCYTES # BLD AUTO: 8.7 % — LOW (ref 20.5–51.1)
MAGNESIUM SERPL-MCNC: 1.8 MG/DL — SIGNIFICANT CHANGE UP (ref 1.8–2.4)
MCHC RBC-ENTMCNC: 31.4 PG — HIGH (ref 27–31)
MCHC RBC-ENTMCNC: 34.9 G/DL — SIGNIFICANT CHANGE UP (ref 32–37)
MCV RBC AUTO: 90.1 FL — SIGNIFICANT CHANGE UP (ref 80–94)
MONOCYTES # BLD AUTO: 0.93 K/UL — HIGH (ref 0.1–0.6)
MONOCYTES NFR BLD AUTO: 7.3 % — SIGNIFICANT CHANGE UP (ref 1.7–9.3)
NEUTROPHILS # BLD AUTO: 10.62 K/UL — HIGH (ref 1.4–6.5)
NEUTROPHILS NFR BLD AUTO: 83.2 % — HIGH (ref 42.2–75.2)
NRBC # BLD: 0 /100 WBCS — SIGNIFICANT CHANGE UP (ref 0–0)
PLATELET # BLD AUTO: 214 K/UL — SIGNIFICANT CHANGE UP (ref 130–400)
POTASSIUM SERPL-MCNC: 4.3 MMOL/L — SIGNIFICANT CHANGE UP (ref 3.5–5)
POTASSIUM SERPL-SCNC: 4.3 MMOL/L — SIGNIFICANT CHANGE UP (ref 3.5–5)
PROT SERPL-MCNC: 5.4 G/DL — LOW (ref 6–8)
RBC # BLD: 3.34 M/UL — LOW (ref 4.7–6.1)
RBC # FLD: 12.9 % — SIGNIFICANT CHANGE UP (ref 11.5–14.5)
SODIUM SERPL-SCNC: 140 MMOL/L — SIGNIFICANT CHANGE UP (ref 135–146)
WBC # BLD: 12.76 K/UL — HIGH (ref 4.8–10.8)
WBC # FLD AUTO: 12.76 K/UL — HIGH (ref 4.8–10.8)

## 2019-06-15 PROCEDURE — 76705 ECHO EXAM OF ABDOMEN: CPT | Mod: 26

## 2019-06-15 PROCEDURE — 99222 1ST HOSP IP/OBS MODERATE 55: CPT

## 2019-06-15 RX ORDER — HEPARIN SODIUM 5000 [USP'U]/ML
5000 INJECTION INTRAVENOUS; SUBCUTANEOUS EVERY 8 HOURS
Refills: 0 | Status: DISCONTINUED | OUTPATIENT
Start: 2019-06-15 | End: 2019-06-18

## 2019-06-15 RX ORDER — OXYCODONE AND ACETAMINOPHEN 5; 325 MG/1; MG/1
1 TABLET ORAL EVERY 4 HOURS
Refills: 0 | Status: DISCONTINUED | OUTPATIENT
Start: 2019-06-15 | End: 2019-06-15

## 2019-06-15 RX ORDER — OXYCODONE AND ACETAMINOPHEN 5; 325 MG/1; MG/1
2 TABLET ORAL EVERY 4 HOURS
Refills: 0 | Status: DISCONTINUED | OUTPATIENT
Start: 2019-06-15 | End: 2019-06-18

## 2019-06-15 RX ORDER — NICOTINE POLACRILEX 2 MG
1 GUM BUCCAL DAILY
Refills: 0 | Status: DISCONTINUED | OUTPATIENT
Start: 2019-06-15 | End: 2019-06-15

## 2019-06-15 RX ADMIN — Medication 25 MILLIGRAM(S): at 05:42

## 2019-06-15 RX ADMIN — SENNA PLUS 2 TABLET(S): 8.6 TABLET ORAL at 22:04

## 2019-06-15 RX ADMIN — LISINOPRIL 5 MILLIGRAM(S): 2.5 TABLET ORAL at 05:42

## 2019-06-15 RX ADMIN — OXYCODONE AND ACETAMINOPHEN 2 TABLET(S): 5; 325 TABLET ORAL at 05:42

## 2019-06-15 RX ADMIN — HEPARIN SODIUM 5000 UNIT(S): 5000 INJECTION INTRAVENOUS; SUBCUTANEOUS at 13:26

## 2019-06-15 RX ADMIN — OXYCODONE AND ACETAMINOPHEN 2 TABLET(S): 5; 325 TABLET ORAL at 22:25

## 2019-06-15 RX ADMIN — Medication 1 TABLET(S): at 11:36

## 2019-06-15 RX ADMIN — PANTOPRAZOLE SODIUM 40 MILLIGRAM(S): 20 TABLET, DELAYED RELEASE ORAL at 05:42

## 2019-06-15 RX ADMIN — Medication 4 MILLIGRAM(S): at 11:35

## 2019-06-15 RX ADMIN — OXYCODONE AND ACETAMINOPHEN 2 TABLET(S): 5; 325 TABLET ORAL at 17:51

## 2019-06-15 RX ADMIN — MORPHINE SULFATE 4 MILLIGRAM(S): 50 CAPSULE, EXTENDED RELEASE ORAL at 01:57

## 2019-06-15 RX ADMIN — HEPARIN SODIUM 5000 UNIT(S): 5000 INJECTION INTRAVENOUS; SUBCUTANEOUS at 22:04

## 2019-06-15 RX ADMIN — Medication 4 MILLIGRAM(S): at 00:20

## 2019-06-15 RX ADMIN — OXYCODONE AND ACETAMINOPHEN 2 TABLET(S): 5; 325 TABLET ORAL at 11:42

## 2019-06-15 RX ADMIN — MORPHINE SULFATE 4 MILLIGRAM(S): 50 CAPSULE, EXTENDED RELEASE ORAL at 09:18

## 2019-06-15 RX ADMIN — Medication 1 PATCH: at 11:36

## 2019-06-15 RX ADMIN — SIMVASTATIN 40 MILLIGRAM(S): 20 TABLET, FILM COATED ORAL at 22:03

## 2019-06-15 RX ADMIN — Medication 4 MILLIGRAM(S): at 17:52

## 2019-06-15 RX ADMIN — Medication 4 MILLIGRAM(S): at 05:40

## 2019-06-15 RX ADMIN — MORPHINE SULFATE 4 MILLIGRAM(S): 50 CAPSULE, EXTENDED RELEASE ORAL at 01:45

## 2019-06-15 RX ADMIN — MORPHINE SULFATE 4 MILLIGRAM(S): 50 CAPSULE, EXTENDED RELEASE ORAL at 01:42

## 2019-06-15 RX ADMIN — Medication 100 MILLIGRAM(S): at 11:35

## 2019-06-15 RX ADMIN — MORPHINE SULFATE 4 MILLIGRAM(S): 50 CAPSULE, EXTENDED RELEASE ORAL at 01:30

## 2019-06-15 NOTE — CONSULT NOTE ADULT - ATTENDING COMMENTS
Patient has weakness predominantly in the upper extremities and also has spastic gait affecting mostly the left lower extremity. MRI suggested suggested spinal cord compression at C3-4 secondary to stenosis and diffuse cervical spondylosis causing multiple foraminal stenosis at multiple levels. Discussed with patient about indications, benefits, risks and alternatives of surgical decompression and stabilization/fixation. He understood our discussion well and requested to proceed. We will arrange OR time in the next few days. Please medically clear patient as well. Thanks.
Pt seen and examined at the bedside  Agree with PA note above

## 2019-06-15 NOTE — CONSULT NOTE ADULT - ASSESSMENT
62 yo male POD # 1 s/p S/P C-3-7 decompressive laminectomy, foraminotomy with lateral mass fixation    pt had traumatic Crenshaw placement intraop, hematuria resolved    plan  -keep Crenshaw today  -d/c Crenshaw for TOV in am 6/16

## 2019-06-15 NOTE — CHART NOTE - NSCHARTNOTEFT_GEN_A_CORE
PACU ANESTHESIA ADMISSION NOTE      Procedure: Open insertion of interspinous process spinal stabilization device into joint of cervical vertebra  Laminectomy or decompressive laminectomy, spine, cervical, 3 or more levels    Post op diagnosis:  Myelopathy concurrent with and due to spinal stenosis of cervical region      ____  Intubated  TV:______       Rate: ______      FiO2: ______    _x___  Patent Airway    _x___  Full return of protective reflexes    _x___  Full recovery from anesthesia / back to baseline status    Vitals:            T:  98.2              BP : 140/70               R:  18            Sat: 96              P:100      Mental Status:  _x___ Awake   _____ Alert   _____ Drowsy   _____ Sedated    Nausea/Vomiting:  _x___  NO       ______Yes,   See Post - Op Orders         Pain Scale (0-10):  __0___    Treatment: _x___ None    ____ See Post - Op/PCA Orders    Post - Operative Fluids:   __x__ Oral   ____ See Post - Op Orders    Plan: Discharge:   __Home       ___x__Floor     _____Critical Care    _____  Other:_________________    Comments:  No anesthesia issues or complications noted.  Discharge when criteria met.

## 2019-06-15 NOTE — PROGRESS NOTE ADULT - SUBJECTIVE AND OBJECTIVE BOX
NEUROSURGERY POST OP NOTE:    S/P           T(C): 36.3 (06-15-19 @ 02:27), Max: 36.8 (06-14-19 @ 23:53)  HR: 88 (06-15-19 @ 02:27) (57 - 100)  BP: 121/70 (06-15-19 @ 02:27) (106/84 - 140/76)  RR: 18 (06-15-19 @ 02:27) (16 - 19)  SpO2: 98% (06-15-19 @ 02:12) (96% - 98%)      06-13-19 @ 07:01  -  06-14-19 @ 07:00  --------------------------------------------------------  IN: 0 mL / OUT: 500 mL / NET: -500 mL    06-14-19 @ 07:01  -  06-15-19 @ 03:55  --------------------------------------------------------  IN: 351.7 mL / OUT: 345 mL / NET: 6.7 mL        acetaminophen   Tablet .. 650 milliGRAM(s) Oral every 6 hours PRN  dexamethasone  Injectable 4 milliGRAM(s) IV Push every 6 hours  dextrose 5% + sodium chloride 0.45%. 1000 milliLiter(s) IV Continuous <Continuous>  docusate sodium 100 milliGRAM(s) Oral daily  hydrochlorothiazide 25 milliGRAM(s) Oral daily  HYDROmorphone  Injectable 0.5 milliGRAM(s) IV Push every 10 minutes PRN  lactated ringers. 1000 milliLiter(s) IV Continuous <Continuous>  lisinopril 5 milliGRAM(s) Oral daily  morphine  - Injectable 4 milliGRAM(s) IV Push every 10 minutes PRN  multivitamin 1 Tablet(s) Oral daily  nicotine - 21 mG/24Hr(s) Patch 1 patch Transdermal daily  nicotine - 21 mG/24Hr(s) Patch 1 patch Transdermal daily  ondansetron Injectable 4 milliGRAM(s) IV Push every 6 hours PRN  oxyCODONE    5 mG/acetaminophen 325 mG 1 Tablet(s) Oral once PRN  oxyCODONE    5 mG/acetaminophen 325 mG 2 Tablet(s) Oral every 6 hours PRN  pantoprazole    Tablet 40 milliGRAM(s) Oral before breakfast  senna 2 Tablet(s) Oral at bedtime  senna 2 Tablet(s) Oral at bedtime PRN  simvastatin 40 milliGRAM(s) Oral at bedtime      Exam:        WOUND/DRAINS:        Assessment:       Plan: NEUROSURGERY POST OP NOTE:    S/P C-3-7 decompressive lami foramenotomy with lateral mass fixation           T(C): 36.3 (06-15-19 @ 02:27), Max: 36.8 (06-14-19 @ 23:53)  HR: 88 (06-15-19 @ 02:27) (57 - 100)  BP: 121/70 (06-15-19 @ 02:27) (106/84 - 140/76)  RR: 18 (06-15-19 @ 02:27) (16 - 19)  SpO2: 98% (06-15-19 @ 02:12) (96% - 98%)      06-13-19 @ 07:01  -  06-14-19 @ 07:00  --------------------------------------------------------  IN: 0 mL / OUT: 500 mL / NET: -500 mL    06-14-19 @ 07:01  -  06-15-19 @ 03:55  --------------------------------------------------------  IN: 351.7 mL / OUT: 345 mL / NET: 6.7 mL        acetaminophen   Tablet .. 650 milliGRAM(s) Oral every 6 hours PRN  dexamethasone  Injectable 4 milliGRAM(s) IV Push every 6 hours  dextrose 5% + sodium chloride 0.45%. 1000 milliLiter(s) IV Continuous <Continuous>  docusate sodium 100 milliGRAM(s) Oral daily  hydrochlorothiazide 25 milliGRAM(s) Oral daily  HYDROmorphone  Injectable 0.5 milliGRAM(s) IV Push every 10 minutes PRN  lactated ringers. 1000 milliLiter(s) IV Continuous <Continuous>  lisinopril 5 milliGRAM(s) Oral daily  morphine  - Injectable 4 milliGRAM(s) IV Push every 10 minutes PRN  multivitamin 1 Tablet(s) Oral daily  nicotine - 21 mG/24Hr(s) Patch 1 patch Transdermal daily  nicotine - 21 mG/24Hr(s) Patch 1 patch Transdermal daily  ondansetron Injectable 4 milliGRAM(s) IV Push every 6 hours PRN  oxyCODONE    5 mG/acetaminophen 325 mG 1 Tablet(s) Oral once PRN  oxyCODONE    5 mG/acetaminophen 325 mG 2 Tablet(s) Oral every 6 hours PRN  pantoprazole    Tablet 40 milliGRAM(s) Oral before breakfast  senna 2 Tablet(s) Oral at bedtime  senna 2 Tablet(s) Oral at bedtime PRN  simvastatin 40 milliGRAM(s) Oral at bedtime      Exam:  Pain controlled no complaints  Midline trachea, dressing dry, no hematoma  B/L BS  HR reg  Abdomen soft NT ND  Crenshaw to gravity, urine blood tinged  Moving all extremities  Motor/sensory grossly intact          Assessment:   Doing well post op    Plan:  Continue post op orders

## 2019-06-15 NOTE — CONSULT NOTE ADULT - SUBJECTIVE AND OBJECTIVE BOX
62 yo male POD # 1 s/p S/P C-3-7 decompressive laminectomy, foraminotomy with lateral mass fixation. Crenshaw on the OR table was placed with resistance, pt had blood tinged urine at end of case, this morning pt had clot in Crenshaw tubing,  c/s called. Pt denies voiding issues at baseline    PAST MEDICAL & SURGICAL HISTORY:  Back pain  HTN (hypertension)  No significant past surgical history    MEDICATIONS  (STANDING):  dexamethasone  Injectable 4 milliGRAM(s) IV Push every 6 hours  dextrose 5% + sodium chloride 0.45%. 1000 milliLiter(s) (75 mL/Hr) IV Continuous <Continuous>  docusate sodium 100 milliGRAM(s) Oral daily  heparin  Injectable 5000 Unit(s) SubCutaneous every 8 hours  hydrochlorothiazide 25 milliGRAM(s) Oral daily  lisinopril 5 milliGRAM(s) Oral daily  multivitamin 1 Tablet(s) Oral daily  nicotine - 21 mG/24Hr(s) Patch 1 patch Transdermal daily  pantoprazole    Tablet 40 milliGRAM(s) Oral before breakfast  senna 2 Tablet(s) Oral at bedtime  simvastatin 40 milliGRAM(s) Oral at bedtime    Allergies    No Known Allergies    Intolerances    SHx - live sat home    FHx- NC    ROS - + neck pain    Vital Signs Last 24 Hrs  T(C): 36.1 (15 Adonis 2019 13:43), Max: 36.8 (14 Jun 2019 23:53)  T(F): 97 (15 Adonis 2019 13:43), Max: 98.2 (14 Jun 2019 23:53)  HR: 92 (15 Adonis 2019 13:43) (65 - 100)  BP: 110/69 (15 Adonis 2019 13:43) (106/84 - 140/76)  RR: 18 (15 Adonis 2019 13:43) (16 - 19)  SpO2: 98% (15 Adonis 2019 02:12) (96% - 98%)    pt seen and examined at bedside  a+ox3, nad, non toxic  abd - soft, nd, nttp, no spt  gu - + Crenshaw, urine clear yellow, no clots                          10.5   12.76 )-----------( 214      ( 15 Adonis 2019 06:07 )             30.1   06-15    140  |  105  |  19  ----------------------------<  138<H>  4.3   |  25  |  0.7    Ca    8.5      15 Adonis 2019 06:07  Mg     1.8     06-15    TPro  5.4<L>  /  Alb  3.0<L>  /  TBili  0.5  /  DBili  x   /  AST  20  /  ALT  17  /  AlkPhos  52  06-15

## 2019-06-16 LAB
ALBUMIN SERPL ELPH-MCNC: 3.3 G/DL — LOW (ref 3.5–5.2)
ALP SERPL-CCNC: 57 U/L — SIGNIFICANT CHANGE UP (ref 30–115)
ALT FLD-CCNC: 18 U/L — SIGNIFICANT CHANGE UP (ref 0–41)
ANION GAP SERPL CALC-SCNC: 11 MMOL/L — SIGNIFICANT CHANGE UP (ref 7–14)
AST SERPL-CCNC: 14 U/L — SIGNIFICANT CHANGE UP (ref 0–41)
BASOPHILS # BLD AUTO: 0.01 K/UL — SIGNIFICANT CHANGE UP (ref 0–0.2)
BASOPHILS NFR BLD AUTO: 0.1 % — SIGNIFICANT CHANGE UP (ref 0–1)
BILIRUB SERPL-MCNC: 0.4 MG/DL — SIGNIFICANT CHANGE UP (ref 0.2–1.2)
BUN SERPL-MCNC: 17 MG/DL — SIGNIFICANT CHANGE UP (ref 10–20)
CALCIUM SERPL-MCNC: 8.4 MG/DL — LOW (ref 8.5–10.1)
CHLORIDE SERPL-SCNC: 102 MMOL/L — SIGNIFICANT CHANGE UP (ref 98–110)
CO2 SERPL-SCNC: 26 MMOL/L — SIGNIFICANT CHANGE UP (ref 17–32)
CREAT SERPL-MCNC: 0.7 MG/DL — SIGNIFICANT CHANGE UP (ref 0.7–1.5)
EOSINOPHIL # BLD AUTO: 0 K/UL — SIGNIFICANT CHANGE UP (ref 0–0.7)
EOSINOPHIL NFR BLD AUTO: 0 % — SIGNIFICANT CHANGE UP (ref 0–8)
GLUCOSE SERPL-MCNC: 131 MG/DL — HIGH (ref 70–99)
HCT VFR BLD CALC: 30.1 % — LOW (ref 42–52)
HGB BLD-MCNC: 10.6 G/DL — LOW (ref 14–18)
IMM GRANULOCYTES NFR BLD AUTO: 0.6 % — HIGH (ref 0.1–0.3)
LYMPHOCYTES # BLD AUTO: 1.16 K/UL — LOW (ref 1.2–3.4)
LYMPHOCYTES # BLD AUTO: 9.1 % — LOW (ref 20.5–51.1)
MAGNESIUM SERPL-MCNC: 2.2 MG/DL — SIGNIFICANT CHANGE UP (ref 1.8–2.4)
MCHC RBC-ENTMCNC: 31.5 PG — HIGH (ref 27–31)
MCHC RBC-ENTMCNC: 35.2 G/DL — SIGNIFICANT CHANGE UP (ref 32–37)
MCV RBC AUTO: 89.3 FL — SIGNIFICANT CHANGE UP (ref 80–94)
MONOCYTES # BLD AUTO: 1.3 K/UL — HIGH (ref 0.1–0.6)
MONOCYTES NFR BLD AUTO: 10.2 % — HIGH (ref 1.7–9.3)
NEUTROPHILS # BLD AUTO: 10.19 K/UL — HIGH (ref 1.4–6.5)
NEUTROPHILS NFR BLD AUTO: 80 % — HIGH (ref 42.2–75.2)
NRBC # BLD: 0 /100 WBCS — SIGNIFICANT CHANGE UP (ref 0–0)
PLATELET # BLD AUTO: 199 K/UL — SIGNIFICANT CHANGE UP (ref 130–400)
POTASSIUM SERPL-MCNC: 4.3 MMOL/L — SIGNIFICANT CHANGE UP (ref 3.5–5)
POTASSIUM SERPL-SCNC: 4.3 MMOL/L — SIGNIFICANT CHANGE UP (ref 3.5–5)
PROT SERPL-MCNC: 5.5 G/DL — LOW (ref 6–8)
RBC # BLD: 3.37 M/UL — LOW (ref 4.7–6.1)
RBC # FLD: 12.9 % — SIGNIFICANT CHANGE UP (ref 11.5–14.5)
SODIUM SERPL-SCNC: 139 MMOL/L — SIGNIFICANT CHANGE UP (ref 135–146)
WBC # BLD: 12.74 K/UL — HIGH (ref 4.8–10.8)
WBC # FLD AUTO: 12.74 K/UL — HIGH (ref 4.8–10.8)

## 2019-06-16 PROCEDURE — 99024 POSTOP FOLLOW-UP VISIT: CPT

## 2019-06-16 RX ADMIN — OXYCODONE AND ACETAMINOPHEN 2 TABLET(S): 5; 325 TABLET ORAL at 08:29

## 2019-06-16 RX ADMIN — Medication 1 PATCH: at 12:07

## 2019-06-16 RX ADMIN — OXYCODONE AND ACETAMINOPHEN 2 TABLET(S): 5; 325 TABLET ORAL at 22:51

## 2019-06-16 RX ADMIN — Medication 4 MILLIGRAM(S): at 00:04

## 2019-06-16 RX ADMIN — HEPARIN SODIUM 5000 UNIT(S): 5000 INJECTION INTRAVENOUS; SUBCUTANEOUS at 14:20

## 2019-06-16 RX ADMIN — SIMVASTATIN 40 MILLIGRAM(S): 20 TABLET, FILM COATED ORAL at 21:35

## 2019-06-16 RX ADMIN — Medication 4 MILLIGRAM(S): at 05:49

## 2019-06-16 RX ADMIN — LISINOPRIL 5 MILLIGRAM(S): 2.5 TABLET ORAL at 05:48

## 2019-06-16 RX ADMIN — OXYCODONE AND ACETAMINOPHEN 2 TABLET(S): 5; 325 TABLET ORAL at 03:17

## 2019-06-16 RX ADMIN — OXYCODONE AND ACETAMINOPHEN 2 TABLET(S): 5; 325 TABLET ORAL at 17:58

## 2019-06-16 RX ADMIN — SENNA PLUS 2 TABLET(S): 8.6 TABLET ORAL at 21:35

## 2019-06-16 RX ADMIN — OXYCODONE AND ACETAMINOPHEN 2 TABLET(S): 5; 325 TABLET ORAL at 12:06

## 2019-06-16 RX ADMIN — Medication 4 MILLIGRAM(S): at 12:06

## 2019-06-16 RX ADMIN — Medication 100 MILLIGRAM(S): at 12:06

## 2019-06-16 RX ADMIN — Medication 25 MILLIGRAM(S): at 05:48

## 2019-06-16 RX ADMIN — Medication 4 MILLIGRAM(S): at 23:50

## 2019-06-16 RX ADMIN — Medication 4 MILLIGRAM(S): at 17:58

## 2019-06-16 RX ADMIN — Medication 1 TABLET(S): at 12:07

## 2019-06-16 RX ADMIN — HEPARIN SODIUM 5000 UNIT(S): 5000 INJECTION INTRAVENOUS; SUBCUTANEOUS at 05:47

## 2019-06-16 RX ADMIN — PANTOPRAZOLE SODIUM 40 MILLIGRAM(S): 20 TABLET, DELAYED RELEASE ORAL at 05:48

## 2019-06-16 RX ADMIN — HEPARIN SODIUM 5000 UNIT(S): 5000 INJECTION INTRAVENOUS; SUBCUTANEOUS at 21:35

## 2019-06-16 NOTE — PROGRESS NOTE ADULT - SUBJECTIVE AND OBJECTIVE BOX
POD #     S/P C3-7 decompressive lami foraminotomy with lateral mass fusion    pt seen and examined in chair pt states his LUE pain is improved .      Vital Signs Last 24 Hrs  T(C): 35.4 (16 Jun 2019 06:59), Max: 36.1 (15 Adonis 2019 13:43)  T(F): 95.8 (16 Jun 2019 06:59), Max: 97 (15 Adonis 2019 13:43)  HR: 58 (16 Jun 2019 06:59) (58 - 92)  BP: 129/64 (16 Jun 2019 06:59) (110/69 - 130/67)  BP(mean): --  RR: 18 (16 Jun 2019 06:59) (18 - 18)  SpO2: --    PHYSICAL EXAM:    Alert, MAEX4  MS bilateral UE's 5/5         bilateral LE's 5/5  incision clean dry intact       MEDICATIONS:  Antibiotics:    Neuro:  ondansetron Injectable 4 milliGRAM(s) IV Push every 6 hours PRN  oxyCODONE    5 mG/acetaminophen 325 mG 2 Tablet(s) Oral every 4 hours PRN    Anticoagulation:  heparin  Injectable 5000 Unit(s) SubCutaneous every 8 hours    OTHER:  dexamethasone  Injectable 4 milliGRAM(s) IV Push every 6 hours  docusate sodium 100 milliGRAM(s) Oral daily  hydrochlorothiazide 25 milliGRAM(s) Oral daily  lisinopril 5 milliGRAM(s) Oral daily  nicotine - 21 mG/24Hr(s) Patch 1 patch Transdermal daily  pantoprazole    Tablet 40 milliGRAM(s) Oral before breakfast  senna 2 Tablet(s) Oral at bedtime  senna 2 Tablet(s) Oral at bedtime PRN  simvastatin 40 milliGRAM(s) Oral at bedtime    IVF:  dextrose 5% + sodium chloride 0.45%. 1000 milliLiter(s) IV Continuous <Continuous>  multivitamin 1 Tablet(s) Oral daily        LABS:                        10.6   12.74 )-----------( 199      ( 16 Jun 2019 06:20 )             30.1     06-16    139  |  102  |  17  ----------------------------<  131<H>  4.3   |  26  |  0.7    Ca    8.4<L>      16 Jun 2019 06:20  Mg     2.2     06-16    TPro  5.5<L>  /  Alb  3.3<L>  /  TBili  0.4  /  DBili  x   /  AST  14  /  ALT  18  /  AlkPhos  57  06-16        A/p       S/P C3-7 decompression lami foraminotomy with lateral mass fusion             d/c song             monitor ANDREI output             PT / rehab

## 2019-06-17 LAB
ALBUMIN SERPL ELPH-MCNC: 3.3 G/DL — LOW (ref 3.5–5.2)
ALP SERPL-CCNC: 54 U/L — SIGNIFICANT CHANGE UP (ref 30–115)
ALT FLD-CCNC: 16 U/L — SIGNIFICANT CHANGE UP (ref 0–41)
ANION GAP SERPL CALC-SCNC: 11 MMOL/L — SIGNIFICANT CHANGE UP (ref 7–14)
AST SERPL-CCNC: 12 U/L — SIGNIFICANT CHANGE UP (ref 0–41)
BASOPHILS # BLD AUTO: 0.01 K/UL — SIGNIFICANT CHANGE UP (ref 0–0.2)
BASOPHILS NFR BLD AUTO: 0.1 % — SIGNIFICANT CHANGE UP (ref 0–1)
BILIRUB SERPL-MCNC: 0.3 MG/DL — SIGNIFICANT CHANGE UP (ref 0.2–1.2)
BUN SERPL-MCNC: 21 MG/DL — HIGH (ref 10–20)
CALCIUM SERPL-MCNC: 8.5 MG/DL — SIGNIFICANT CHANGE UP (ref 8.5–10.1)
CHLORIDE SERPL-SCNC: 103 MMOL/L — SIGNIFICANT CHANGE UP (ref 98–110)
CO2 SERPL-SCNC: 25 MMOL/L — SIGNIFICANT CHANGE UP (ref 17–32)
CREAT SERPL-MCNC: 0.7 MG/DL — SIGNIFICANT CHANGE UP (ref 0.7–1.5)
EOSINOPHIL # BLD AUTO: 0 K/UL — SIGNIFICANT CHANGE UP (ref 0–0.7)
EOSINOPHIL NFR BLD AUTO: 0 % — SIGNIFICANT CHANGE UP (ref 0–8)
GLUCOSE SERPL-MCNC: 129 MG/DL — HIGH (ref 70–99)
HCT VFR BLD CALC: 28.5 % — LOW (ref 42–52)
HGB BLD-MCNC: 10 G/DL — LOW (ref 14–18)
IMM GRANULOCYTES NFR BLD AUTO: 1.2 % — HIGH (ref 0.1–0.3)
LYMPHOCYTES # BLD AUTO: 1.41 K/UL — SIGNIFICANT CHANGE UP (ref 1.2–3.4)
LYMPHOCYTES # BLD AUTO: 10.8 % — LOW (ref 20.5–51.1)
MAGNESIUM SERPL-MCNC: 2.1 MG/DL — SIGNIFICANT CHANGE UP (ref 1.8–2.4)
MCHC RBC-ENTMCNC: 31.3 PG — HIGH (ref 27–31)
MCHC RBC-ENTMCNC: 35.1 G/DL — SIGNIFICANT CHANGE UP (ref 32–37)
MCV RBC AUTO: 89.3 FL — SIGNIFICANT CHANGE UP (ref 80–94)
MONOCYTES # BLD AUTO: 1.02 K/UL — HIGH (ref 0.1–0.6)
MONOCYTES NFR BLD AUTO: 7.8 % — SIGNIFICANT CHANGE UP (ref 1.7–9.3)
NEUTROPHILS # BLD AUTO: 10.51 K/UL — HIGH (ref 1.4–6.5)
NEUTROPHILS NFR BLD AUTO: 80.1 % — HIGH (ref 42.2–75.2)
NRBC # BLD: 0 /100 WBCS — SIGNIFICANT CHANGE UP (ref 0–0)
PLATELET # BLD AUTO: 247 K/UL — SIGNIFICANT CHANGE UP (ref 130–400)
POTASSIUM SERPL-MCNC: 4.5 MMOL/L — SIGNIFICANT CHANGE UP (ref 3.5–5)
POTASSIUM SERPL-SCNC: 4.5 MMOL/L — SIGNIFICANT CHANGE UP (ref 3.5–5)
PROT SERPL-MCNC: 5.7 G/DL — LOW (ref 6–8)
RBC # BLD: 3.19 M/UL — LOW (ref 4.7–6.1)
RBC # FLD: 13.1 % — SIGNIFICANT CHANGE UP (ref 11.5–14.5)
SODIUM SERPL-SCNC: 139 MMOL/L — SIGNIFICANT CHANGE UP (ref 135–146)
WBC # BLD: 13.11 K/UL — HIGH (ref 4.8–10.8)
WBC # FLD AUTO: 13.11 K/UL — HIGH (ref 4.8–10.8)

## 2019-06-17 PROCEDURE — 99024 POSTOP FOLLOW-UP VISIT: CPT

## 2019-06-17 RX ORDER — POLYETHYLENE GLYCOL 3350 17 G/17G
17 POWDER, FOR SOLUTION ORAL ONCE
Refills: 0 | Status: COMPLETED | OUTPATIENT
Start: 2019-06-17 | End: 2019-06-17

## 2019-06-17 RX ADMIN — Medication 100 MILLIGRAM(S): at 11:34

## 2019-06-17 RX ADMIN — PANTOPRAZOLE SODIUM 40 MILLIGRAM(S): 20 TABLET, DELAYED RELEASE ORAL at 06:06

## 2019-06-17 RX ADMIN — Medication 1 PATCH: at 07:33

## 2019-06-17 RX ADMIN — Medication 4 MILLIGRAM(S): at 06:05

## 2019-06-17 RX ADMIN — Medication 1 PATCH: at 11:34

## 2019-06-17 RX ADMIN — Medication 1 PATCH: at 19:00

## 2019-06-17 RX ADMIN — OXYCODONE AND ACETAMINOPHEN 2 TABLET(S): 5; 325 TABLET ORAL at 17:35

## 2019-06-17 RX ADMIN — OXYCODONE AND ACETAMINOPHEN 2 TABLET(S): 5; 325 TABLET ORAL at 09:03

## 2019-06-17 RX ADMIN — HEPARIN SODIUM 5000 UNIT(S): 5000 INJECTION INTRAVENOUS; SUBCUTANEOUS at 21:08

## 2019-06-17 RX ADMIN — Medication 25 MILLIGRAM(S): at 06:06

## 2019-06-17 RX ADMIN — Medication 4 MILLIGRAM(S): at 17:30

## 2019-06-17 RX ADMIN — OXYCODONE AND ACETAMINOPHEN 2 TABLET(S): 5; 325 TABLET ORAL at 09:33

## 2019-06-17 RX ADMIN — POLYETHYLENE GLYCOL 3350 17 GRAM(S): 17 POWDER, FOR SOLUTION ORAL at 17:30

## 2019-06-17 RX ADMIN — OXYCODONE AND ACETAMINOPHEN 2 TABLET(S): 5; 325 TABLET ORAL at 13:25

## 2019-06-17 RX ADMIN — SENNA PLUS 2 TABLET(S): 8.6 TABLET ORAL at 21:08

## 2019-06-17 RX ADMIN — Medication 4 MILLIGRAM(S): at 23:37

## 2019-06-17 RX ADMIN — OXYCODONE AND ACETAMINOPHEN 2 TABLET(S): 5; 325 TABLET ORAL at 00:17

## 2019-06-17 RX ADMIN — HEPARIN SODIUM 5000 UNIT(S): 5000 INJECTION INTRAVENOUS; SUBCUTANEOUS at 06:05

## 2019-06-17 RX ADMIN — Medication 1 PATCH: at 11:35

## 2019-06-17 RX ADMIN — HEPARIN SODIUM 5000 UNIT(S): 5000 INJECTION INTRAVENOUS; SUBCUTANEOUS at 14:14

## 2019-06-17 RX ADMIN — Medication 1 TABLET(S): at 11:34

## 2019-06-17 RX ADMIN — OXYCODONE AND ACETAMINOPHEN 2 TABLET(S): 5; 325 TABLET ORAL at 21:36

## 2019-06-17 RX ADMIN — OXYCODONE AND ACETAMINOPHEN 2 TABLET(S): 5; 325 TABLET ORAL at 13:55

## 2019-06-17 RX ADMIN — SIMVASTATIN 40 MILLIGRAM(S): 20 TABLET, FILM COATED ORAL at 21:07

## 2019-06-17 RX ADMIN — LISINOPRIL 5 MILLIGRAM(S): 2.5 TABLET ORAL at 06:06

## 2019-06-17 RX ADMIN — OXYCODONE AND ACETAMINOPHEN 2 TABLET(S): 5; 325 TABLET ORAL at 18:05

## 2019-06-17 RX ADMIN — Medication 4 MILLIGRAM(S): at 11:34

## 2019-06-17 RX ADMIN — OXYCODONE AND ACETAMINOPHEN 2 TABLET(S): 5; 325 TABLET ORAL at 04:30

## 2019-06-17 NOTE — CONSULT NOTE ADULT - ASSESSMENT
IMPRESSION: Rehab of  cervical myelopathy, S/p decomp and fusion, ataxia, left upper extremity weakness    PRECAUTIONS: [x  ] Cardiac  [  ] Respiratory  [  ] Seizures [  ] Contact Isolation  [  ] Droplet Isolation  [  ] Other    Weight Bearing Status:     RECOMMENDATION:  No heavy, push, pull, lift.    Out of Bed to Chair     DVT/Decubiti Prophylaxis    REHAB PLAN:     [ xx  ] Bedside P/T 3-5 times a week   [ xx  ]   Bedside O/T  2-3 times a week             [   ] No Rehab Therapy Indicated                   [   ]  Speech Therapy   Conditioning/ROM                                    ADL  Bed Mobility                                               Conditioning/ROM  Transfers                                                     Bed Mobility  Sitting /Standing Balance                         Transfers                                        Gait Training                                               Sitting/Standing Balance  Stair Training [   ]Applicable                    Home equipment Eval                                                                        Splinting  [   ] Only      GOALS:   ADL   [x   ]   Independent                    Transfers  [ x  ] Independent                          Ambulation  [ x  ] Independent     [ x   ] With device                            [   ]  CG                                                         [   ]  CG                                                                  [   ] CG                            [    ] Min A                                                   [   ] Min A                                                              [   ] Min  A          DISCHARGE PLAN:   [ xx  ]  Good candidate for Intensive Rehabilitation/Hospital based                                             Will tolerate 3hrs Intensive Rehab Daily                                       [    ]  Short Term Rehab in Skilled Nursing Facility                                       [    ]  Home with Outpatient or  services                                         [    ]  Possible Candidate for Intensive Hospital based Rehab

## 2019-06-17 NOTE — OCCUPATIONAL THERAPY INITIAL EVALUATION ADULT - PLANNED THERAPY INTERVENTIONS, OT EVAL
parent/caregiver training.../ADL retraining/balance training/bed mobility training/fine motor coordination training/ROM/strengthening/transfer training/IADL retraining

## 2019-06-17 NOTE — CONSULT NOTE ADULT - SUBJECTIVE AND OBJECTIVE BOX
HPI:  63 year old  man with past medical history of  Hypertension  and active smoking only,  presents with complaint of L sided weakness and L arm numbness/tingling which he woke up with around 5/24. Pt     initially had some L sided neck pain and attributed the symptoms to a pinched nerve. Pt saw chiropractor who did plain films and concurred. Pt came to ED as the weakness is     worsening and affecting his work as a [employer]. Patient denies any headache, any vision complaints, runny nose, fever, chills, sore throat. Denies chest pain, shortness of     breath, palpitation. Denies nausea, vomiting, abdominal pain, diarrhoea, Denies urinary burning, urgency, frequency, dysuria. (10 Adonis 2019 18:44)      PAST MEDICAL & SURGICAL HISTORY:  Back pain  HTN (hypertension)  No significant past surgical history      Hospital Course:  Diagnosed with cervical myelopathy with ataxia and left upper extremity weakness. Symptoms required hospitalization and had been present for 2 weeks.   He is s/p decomp and fusion with dr. Khoury. he has incissional pain. His left hand has had improvement in strength. He is walking with a walker with PT. Ataxia has stabalized.   TODAY'S SUBJECTIVE & REVIEW OF SYMPTOMS:     Constitutional WNL   Cardio WNL   Resp WNL   GI WNL  Heme WNL  Endo WNL  Skin WNL  MSK WNL  Neuro WNL  Cognitive WNL  Psych WNL      MEDICATIONS  (STANDING):  dexamethasone  Injectable 4 milliGRAM(s) IV Push every 6 hours  docusate sodium 100 milliGRAM(s) Oral daily  heparin  Injectable 5000 Unit(s) SubCutaneous every 8 hours  hydrochlorothiazide 25 milliGRAM(s) Oral daily  lisinopril 5 milliGRAM(s) Oral daily  multivitamin 1 Tablet(s) Oral daily  nicotine - 21 mG/24Hr(s) Patch 1 patch Transdermal daily  pantoprazole    Tablet 40 milliGRAM(s) Oral before breakfast  polyethylene glycol 3350 17 Gram(s) Oral once  senna 2 Tablet(s) Oral at bedtime  simvastatin 40 milliGRAM(s) Oral at bedtime    MEDICATIONS  (PRN):  ondansetron Injectable 4 milliGRAM(s) IV Push every 6 hours PRN Nausea and/or Vomiting  oxyCODONE    5 mG/acetaminophen 325 mG 2 Tablet(s) Oral every 4 hours PRN Moderate Pain (4 - 6)  senna 2 Tablet(s) Oral at bedtime PRN Constipation      FAMILY HISTORY:      Allergies    No Known Allergies    Intolerances        SOCIAL HISTORY:    [  ] Etoh  [  ] Smoking  [  ] Substance abuse     Home Environment:  [  ] Home Alone  [ x ] Lives with Family-spouse  [  ] Home Health Aid    Dwelling:  [  ] Apartment  [  ] Private House  [  ] Adult Home  [  ] Skilled Nursing Facility      [  ] Short Term  [  ] Long Term  [x  ] Stairs-flight to enter and a flight inside      Elevator [  ]    FUNCTIONAL STATUS PTA: (Check all that apply)  Ambulation: [   ]Independent    [  ] Dependent     [  ] Non-Ambulatory  Assistive Device: [  ] SA Cane  [  ]  Q Cane  [  ] Walker  [  ]  Wheelchair  ADL : [  ] Independent  [  ]  Dependent       Vital Signs Last 24 Hrs  T(C): 35.6 (17 Jun 2019 05:56), Max: 36.6 (16 Jun 2019 22:01)  T(F): 96 (17 Jun 2019 05:56), Max: 97.8 (16 Jun 2019 22:01)  HR: 62 (17 Jun 2019 05:56) (62 - 78)  BP: 127/74 (17 Jun 2019 05:56) (104/66 - 127/74)  BP(mean): --  RR: 18 (17 Jun 2019 05:56) (18 - 18)  SpO2: --      PHYSICAL EXAM: Alert & Oriented X3  GENERAL: NAD, well-groomed, well-developed  HEAD:  Atraumatic, Normocephalic  EYES: EOMI, PERRLA, conjunctiva and sclera clear  NECK: Supple, No JVD, Normal thyroid  CHEST/LUNG: Clear to percussion bilaterally; No rales, rhonchi, wheezing, or rubs  HEART: Regular rate and rhythm; No murmurs, rubs, or gallops  ABDOMEN: Soft, Nontender, Nondistended; Bowel sounds present  EXTREMITIES:  2+ Peripheral Pulses, No clubbing, cyanosis, or edema    NERVOUS SYSTEM:  Cranial Nerves 2-12 intact [  ] Abnormal  [  ]  ROM: WFL all extremities [  ]  Abnormal [  ]  Motor Strength: WFL all extremities  [  ]  Abnormal [x  ]  left hand intin 4+/5; bilateral LE's 5/5  Sensation: intact to light touch [  ] Abnormal [  ] intact proprioception big toes  Reflexes: Symmetric [  ]  Abnormal [x  ] brisk bilateral knee jerks    FUNCTIONAL STATUS:  Bed Mobility: Independent [  ]  Supervision [  ]  Needs Assistance [  ]  N/A [  ]  Transfers: Independent [  ]  Supervision [  ]  Needs Assistance [  ]  N/A [  ]   Ambulation: Independent [  ]  Supervision [  ]  Needs Assistance [  ]  N/A [  ]  ADL: Independent [  ] Requires Assistance [  ] N/A [  ]      LABS:                        10.0   13.11 )-----------( 247      ( 17 Jun 2019 00:00 )             28.5     06-17    139  |  103  |  21<H>  ----------------------------<  129<H>  4.5   |  25  |  0.7    Ca    8.5      17 Jun 2019 00:00  Mg     2.1     06-17    TPro  5.7<L>  /  Alb  3.3<L>  /  TBili  0.3  /  DBili  x   /  AST  12  /  ALT  16  /  AlkPhos  54  06-17          RADIOLOGY & ADDITIONAL STUDIES:    Assesment:

## 2019-06-17 NOTE — PROGRESS NOTE ADULT - SUBJECTIVE AND OBJECTIVE BOX
Subjective: 63yMale with a pmhx of LEFT ARM WEAKNESS  ^HAND NUMBNESS  Handoff  MEWS Score  Back pain  HTN (hypertension)  Myelopathy concurrent with and due to spinal stenosis of cervical region  Myelopathy concurrent with and due to spinal stenosis of cervical region  Left arm weakness  Open insertion of interspinous process spinal stabilization device into joint of cervical vertebra  Laminectomy or decompressive laminectomy, spine, cervical, 3 or more levels  No significant past surgical history  HAND NUMBNESS    POD # 2    S/P C3-7 decompressive lami foraminotomy with lateral mass fusion    pt seen and examined in chair pt states his LUE pain is improved . His strength in his left arm is much improved from pre-op. States he has some residual numbness to the left arm down to fingers but it is improved    Allergies    No Known Allergies    Intolerances        Vital Signs Last 24 Hrs  T(C): 35.6 (17 Jun 2019 05:56), Max: 36.6 (16 Jun 2019 22:01)  T(F): 96 (17 Jun 2019 05:56), Max: 97.8 (16 Jun 2019 22:01)  HR: 62 (17 Jun 2019 05:56) (62 - 78)  BP: 127/74 (17 Jun 2019 05:56) (104/66 - 127/74)  BP(mean): --  RR: 18 (17 Jun 2019 05:56) (18 - 18)  SpO2: --      dexamethasone  Injectable 4 milliGRAM(s) IV Push every 6 hours  dextrose 5% + sodium chloride 0.45%. 1000 milliLiter(s) IV Continuous <Continuous>  docusate sodium 100 milliGRAM(s) Oral daily  heparin  Injectable 5000 Unit(s) SubCutaneous every 8 hours  hydrochlorothiazide 25 milliGRAM(s) Oral daily  lisinopril 5 milliGRAM(s) Oral daily  multivitamin 1 Tablet(s) Oral daily  nicotine - 21 mG/24Hr(s) Patch 1 patch Transdermal daily  ondansetron Injectable 4 milliGRAM(s) IV Push every 6 hours PRN  oxyCODONE    5 mG/acetaminophen 325 mG 2 Tablet(s) Oral every 4 hours PRN  pantoprazole    Tablet 40 milliGRAM(s) Oral before breakfast  senna 2 Tablet(s) Oral at bedtime  senna 2 Tablet(s) Oral at bedtime PRN  simvastatin 40 milliGRAM(s) Oral at bedtime        06-16-19 @ 07:01  -  06-17-19 @ 07:00  --------------------------------------------------------  IN: 0 mL / OUT: 1258 mL / NET: -1258 mL          Exam:  AAOX3. Verbal function intact  follows commands  Motor: MAEx4  5/5 power RUE  5/5 LUE  hyperreflexic  Sensation: decreased to LUE  incision dressing clean and dry, ANDREI intact      CBC Full  -  ( 17 Jun 2019 00:00 )  WBC Count : 13.11 K/uL  RBC Count : 3.19 M/uL  Hemoglobin : 10.0 g/dL  Hematocrit : 28.5 %  Platelet Count - Automated : 247 K/uL  Mean Cell Volume : 89.3 fL  Mean Cell Hemoglobin : 31.3 pg  Mean Cell Hemoglobin Concentration : 35.1 g/dL  Auto Neutrophil # : 10.51 K/uL  Auto Lymphocyte # : 1.41 K/uL  Auto Monocyte # : 1.02 K/uL  Auto Eosinophil # : 0.00 K/uL  Auto Basophil # : 0.01 K/uL  Auto Neutrophil % : 80.1 %  Auto Lymphocyte % : 10.8 %  Auto Monocyte % : 7.8 %  Auto Eosinophil % : 0.0 %  Auto Basophil % : 0.1 %    06-17    139  |  103  |  21<H>  ----------------------------<  129<H>  4.5   |  25  |  0.7    Ca    8.5      17 Jun 2019 00:00  Mg     2.1     06-17    TPro  5.7<L>  /  Alb  3.3<L>  /  TBili  0.3  /  DBili  x   /  AST  12  /  ALT  16  /  AlkPhos  54  06-17        Assessment/Plan: as above  ANDREI 118 in 24h, keep today  miralax for constipation  pt/rehab  d/w attending

## 2019-06-17 NOTE — DIETITIAN INITIAL EVALUATION ADULT. - ENERGY NEEDS
Energy: 2556-4038 kcal/day (MSJx1.2-1.3 AF)    Protein: 67-84 g/day (0.8-1 g/kg ABW)    Fluids: 1 mL/kcal

## 2019-06-17 NOTE — DIETITIAN INITIAL EVALUATION ADULT. - PHYSICAL APPEARANCE
BMI: 26.6. Alert. Last BM 6/13. No chewing/swallowing difficulty reported. Skin: surgical incisions.

## 2019-06-17 NOTE — DIETITIAN INITIAL EVALUATION ADULT. - OTHER INFO
Reason for RD assessment: LOS assessment. Pertinent Medical Information: p/w L sided weakness. S/P C3-7 decompressive lami foraminotomy with lateral mass fusion POD#2.

## 2019-06-17 NOTE — DIETITIAN INITIAL EVALUATION ADULT. - DIET TYPE
DASH/TLC (sodium and cholesterol restricted diet)/fair appetite & po intake this admit; consuming 75% of most meals.

## 2019-06-17 NOTE — DIETITIAN INITIAL EVALUATION ADULT. - SOURCE
Fair appetite & po intake PTP. NKFA. No supplements. No known history of unintentional wt loss. Basic knowledge of heart healthy diet reported.

## 2019-06-18 ENCOUNTER — TRANSCRIPTION ENCOUNTER (OUTPATIENT)
Age: 64
End: 2019-06-18

## 2019-06-18 ENCOUNTER — INPATIENT (INPATIENT)
Facility: HOSPITAL | Age: 64
LOS: 14 days | Discharge: HOME | End: 2019-07-03
Attending: PHYSICAL MEDICINE & REHABILITATION | Admitting: PHYSICAL MEDICINE & REHABILITATION
Payer: COMMERCIAL

## 2019-06-18 VITALS
TEMPERATURE: 97 F | DIASTOLIC BLOOD PRESSURE: 60 MMHG | SYSTOLIC BLOOD PRESSURE: 120 MMHG | HEART RATE: 75 BPM | RESPIRATION RATE: 17 BRPM

## 2019-06-18 LAB
ALBUMIN SERPL ELPH-MCNC: 3.5 G/DL — SIGNIFICANT CHANGE UP (ref 3.5–5.2)
ALP SERPL-CCNC: 59 U/L — SIGNIFICANT CHANGE UP (ref 30–115)
ALT FLD-CCNC: 18 U/L — SIGNIFICANT CHANGE UP (ref 0–41)
ANION GAP SERPL CALC-SCNC: 12 MMOL/L — SIGNIFICANT CHANGE UP (ref 7–14)
AST SERPL-CCNC: 12 U/L — SIGNIFICANT CHANGE UP (ref 0–41)
BASOPHILS # BLD AUTO: 0.03 K/UL — SIGNIFICANT CHANGE UP (ref 0–0.2)
BASOPHILS NFR BLD AUTO: 0.2 % — SIGNIFICANT CHANGE UP (ref 0–1)
BILIRUB SERPL-MCNC: 0.3 MG/DL — SIGNIFICANT CHANGE UP (ref 0.2–1.2)
BUN SERPL-MCNC: 25 MG/DL — HIGH (ref 10–20)
CALCIUM SERPL-MCNC: 9 MG/DL — SIGNIFICANT CHANGE UP (ref 8.5–10.1)
CHLORIDE SERPL-SCNC: 100 MMOL/L — SIGNIFICANT CHANGE UP (ref 98–110)
CO2 SERPL-SCNC: 25 MMOL/L — SIGNIFICANT CHANGE UP (ref 17–32)
CREAT SERPL-MCNC: 0.6 MG/DL — LOW (ref 0.7–1.5)
EOSINOPHIL # BLD AUTO: 0 K/UL — SIGNIFICANT CHANGE UP (ref 0–0.7)
EOSINOPHIL NFR BLD AUTO: 0 % — SIGNIFICANT CHANGE UP (ref 0–8)
GLUCOSE SERPL-MCNC: 124 MG/DL — HIGH (ref 70–99)
HCT VFR BLD CALC: 31.5 % — LOW (ref 42–52)
HGB BLD-MCNC: 10.9 G/DL — LOW (ref 14–18)
IMM GRANULOCYTES NFR BLD AUTO: 2.6 % — HIGH (ref 0.1–0.3)
LYMPHOCYTES # BLD AUTO: 1.81 K/UL — SIGNIFICANT CHANGE UP (ref 1.2–3.4)
LYMPHOCYTES # BLD AUTO: 12.2 % — LOW (ref 20.5–51.1)
MAGNESIUM SERPL-MCNC: 2.2 MG/DL — SIGNIFICANT CHANGE UP (ref 1.8–2.4)
MCHC RBC-ENTMCNC: 31.3 PG — HIGH (ref 27–31)
MCHC RBC-ENTMCNC: 34.6 G/DL — SIGNIFICANT CHANGE UP (ref 32–37)
MCV RBC AUTO: 90.5 FL — SIGNIFICANT CHANGE UP (ref 80–94)
MONOCYTES # BLD AUTO: 1.12 K/UL — HIGH (ref 0.1–0.6)
MONOCYTES NFR BLD AUTO: 7.5 % — SIGNIFICANT CHANGE UP (ref 1.7–9.3)
NEUTROPHILS # BLD AUTO: 11.54 K/UL — HIGH (ref 1.4–6.5)
NEUTROPHILS NFR BLD AUTO: 77.5 % — HIGH (ref 42.2–75.2)
NRBC # BLD: 0 /100 WBCS — SIGNIFICANT CHANGE UP (ref 0–0)
PLATELET # BLD AUTO: 270 K/UL — SIGNIFICANT CHANGE UP (ref 130–400)
POTASSIUM SERPL-MCNC: 4.4 MMOL/L — SIGNIFICANT CHANGE UP (ref 3.5–5)
POTASSIUM SERPL-SCNC: 4.4 MMOL/L — SIGNIFICANT CHANGE UP (ref 3.5–5)
PROT SERPL-MCNC: 6.3 G/DL — SIGNIFICANT CHANGE UP (ref 6–8)
RBC # BLD: 3.48 M/UL — LOW (ref 4.7–6.1)
RBC # FLD: 13.2 % — SIGNIFICANT CHANGE UP (ref 11.5–14.5)
SODIUM SERPL-SCNC: 137 MMOL/L — SIGNIFICANT CHANGE UP (ref 135–146)
WBC # BLD: 14.88 K/UL — HIGH (ref 4.8–10.8)
WBC # FLD AUTO: 14.88 K/UL — HIGH (ref 4.8–10.8)

## 2019-06-18 PROCEDURE — 99024 POSTOP FOLLOW-UP VISIT: CPT

## 2019-06-18 PROCEDURE — 93010 ELECTROCARDIOGRAM REPORT: CPT

## 2019-06-18 RX ORDER — PANTOPRAZOLE SODIUM 20 MG/1
40 TABLET, DELAYED RELEASE ORAL
Refills: 0 | Status: DISCONTINUED | OUTPATIENT
Start: 2019-06-18 | End: 2019-07-03

## 2019-06-18 RX ORDER — DEXAMETHASONE 0.5 MG/5ML
1 ELIXIR ORAL EVERY 12 HOURS
Refills: 0 | Status: COMPLETED | OUTPATIENT
Start: 2019-06-25 | End: 2019-06-26

## 2019-06-18 RX ORDER — NICOTINE POLACRILEX 2 MG
1 GUM BUCCAL DAILY
Refills: 0 | Status: DISCONTINUED | OUTPATIENT
Start: 2019-06-18 | End: 2019-07-03

## 2019-06-18 RX ORDER — DEXAMETHASONE 0.5 MG/5ML
ELIXIR ORAL
Refills: 0 | Status: COMPLETED | OUTPATIENT
Start: 2019-06-18 | End: 2019-06-29

## 2019-06-18 RX ORDER — PANTOPRAZOLE SODIUM 20 MG/1
1 TABLET, DELAYED RELEASE ORAL
Qty: 0 | Refills: 0 | DISCHARGE
Start: 2019-06-18

## 2019-06-18 RX ORDER — HEPARIN SODIUM 5000 [USP'U]/ML
5000 INJECTION INTRAVENOUS; SUBCUTANEOUS EVERY 8 HOURS
Refills: 0 | Status: DISCONTINUED | OUTPATIENT
Start: 2019-06-18 | End: 2019-06-29

## 2019-06-18 RX ORDER — SENNA PLUS 8.6 MG/1
2 TABLET ORAL
Qty: 0 | Refills: 0 | DISCHARGE
Start: 2019-06-18

## 2019-06-18 RX ORDER — HYDROCODONE BITARTRATE 50 MG/1
0 CAPSULE, EXTENDED RELEASE ORAL
Qty: 0 | Refills: 0 | DISCHARGE

## 2019-06-18 RX ORDER — SENNA PLUS 8.6 MG/1
2 TABLET ORAL AT BEDTIME
Refills: 0 | Status: DISCONTINUED | OUTPATIENT
Start: 2019-06-18 | End: 2019-07-01

## 2019-06-18 RX ORDER — NICOTINE POLACRILEX 2 MG
0 GUM BUCCAL
Qty: 0 | Refills: 0 | DISCHARGE
Start: 2019-06-18

## 2019-06-18 RX ORDER — SIMVASTATIN 20 MG/1
1 TABLET, FILM COATED ORAL
Qty: 0 | Refills: 0 | DISCHARGE
Start: 2019-06-18

## 2019-06-18 RX ORDER — DEXAMETHASONE 0.5 MG/5ML
4 ELIXIR ORAL EVERY 8 HOURS
Refills: 0 | Status: COMPLETED | OUTPATIENT
Start: 2019-06-18 | End: 2019-06-20

## 2019-06-18 RX ORDER — DEXAMETHASONE 0.5 MG/5ML
1 ELIXIR ORAL
Qty: 0 | Refills: 0 | DISCHARGE
Start: 2019-06-18

## 2019-06-18 RX ORDER — DEXAMETHASONE 0.5 MG/5ML
2 ELIXIR ORAL EVERY 12 HOURS
Refills: 0 | Status: COMPLETED | OUTPATIENT
Start: 2019-06-23 | End: 2019-06-24

## 2019-06-18 RX ORDER — ONDANSETRON 8 MG/1
4 TABLET, FILM COATED ORAL EVERY 6 HOURS
Refills: 0 | Status: DISCONTINUED | OUTPATIENT
Start: 2019-06-18 | End: 2019-07-03

## 2019-06-18 RX ORDER — DEXAMETHASONE 0.5 MG/5ML
1 ELIXIR ORAL DAILY
Refills: 0 | Status: COMPLETED | OUTPATIENT
Start: 2019-06-27 | End: 2019-06-28

## 2019-06-18 RX ORDER — SIMVASTATIN 20 MG/1
40 TABLET, FILM COATED ORAL AT BEDTIME
Refills: 0 | Status: DISCONTINUED | OUTPATIENT
Start: 2019-06-18 | End: 2019-07-03

## 2019-06-18 RX ORDER — ONDANSETRON 8 MG/1
0 TABLET, FILM COATED ORAL
Qty: 0 | Refills: 0 | DISCHARGE
Start: 2019-06-18

## 2019-06-18 RX ORDER — OXYCODONE AND ACETAMINOPHEN 5; 325 MG/1; MG/1
2 TABLET ORAL EVERY 4 HOURS
Refills: 0 | Status: DISCONTINUED | OUTPATIENT
Start: 2019-06-18 | End: 2019-06-24

## 2019-06-18 RX ORDER — DOCUSATE SODIUM 100 MG
1 CAPSULE ORAL
Qty: 0 | Refills: 0 | DISCHARGE
Start: 2019-06-18

## 2019-06-18 RX ORDER — DEXAMETHASONE 0.5 MG/5ML
4 ELIXIR ORAL EVERY 12 HOURS
Refills: 0 | Status: COMPLETED | OUTPATIENT
Start: 2019-06-21 | End: 2019-06-22

## 2019-06-18 RX ORDER — DOCUSATE SODIUM 100 MG
100 CAPSULE ORAL
Refills: 0 | Status: DISCONTINUED | OUTPATIENT
Start: 2019-06-18 | End: 2019-06-20

## 2019-06-18 RX ORDER — LISINOPRIL 2.5 MG/1
1 TABLET ORAL
Qty: 0 | Refills: 0 | DISCHARGE

## 2019-06-18 RX ORDER — LISINOPRIL 2.5 MG/1
1 TABLET ORAL
Qty: 0 | Refills: 0 | DISCHARGE
Start: 2019-06-18

## 2019-06-18 RX ORDER — LISINOPRIL 2.5 MG/1
5 TABLET ORAL DAILY
Refills: 0 | Status: DISCONTINUED | OUTPATIENT
Start: 2019-06-18 | End: 2019-07-03

## 2019-06-18 RX ORDER — HYDROCHLOROTHIAZIDE 25 MG
1 TABLET ORAL
Qty: 0 | Refills: 0 | DISCHARGE
Start: 2019-06-18

## 2019-06-18 RX ORDER — HYDROCHLOROTHIAZIDE 25 MG
25 TABLET ORAL DAILY
Refills: 0 | Status: DISCONTINUED | OUTPATIENT
Start: 2019-06-18 | End: 2019-06-19

## 2019-06-18 RX ORDER — HEPARIN SODIUM 5000 [USP'U]/ML
5000 INJECTION INTRAVENOUS; SUBCUTANEOUS
Qty: 0 | Refills: 0 | DISCHARGE
Start: 2019-06-18

## 2019-06-18 RX ADMIN — LISINOPRIL 5 MILLIGRAM(S): 2.5 TABLET ORAL at 06:19

## 2019-06-18 RX ADMIN — OXYCODONE AND ACETAMINOPHEN 2 TABLET(S): 5; 325 TABLET ORAL at 19:39

## 2019-06-18 RX ADMIN — PANTOPRAZOLE SODIUM 40 MILLIGRAM(S): 20 TABLET, DELAYED RELEASE ORAL at 06:19

## 2019-06-18 RX ADMIN — Medication 1 PATCH: at 12:08

## 2019-06-18 RX ADMIN — Medication 4 MILLIGRAM(S): at 21:29

## 2019-06-18 RX ADMIN — Medication 1 PATCH: at 07:01

## 2019-06-18 RX ADMIN — Medication 4 MILLIGRAM(S): at 17:08

## 2019-06-18 RX ADMIN — Medication 4 MILLIGRAM(S): at 12:07

## 2019-06-18 RX ADMIN — SIMVASTATIN 40 MILLIGRAM(S): 20 TABLET, FILM COATED ORAL at 21:29

## 2019-06-18 RX ADMIN — OXYCODONE AND ACETAMINOPHEN 2 TABLET(S): 5; 325 TABLET ORAL at 21:26

## 2019-06-18 RX ADMIN — Medication 4 MILLIGRAM(S): at 06:19

## 2019-06-18 RX ADMIN — Medication 1 TABLET(S): at 12:08

## 2019-06-18 RX ADMIN — HEPARIN SODIUM 5000 UNIT(S): 5000 INJECTION INTRAVENOUS; SUBCUTANEOUS at 21:29

## 2019-06-18 RX ADMIN — OXYCODONE AND ACETAMINOPHEN 2 TABLET(S): 5; 325 TABLET ORAL at 12:14

## 2019-06-18 RX ADMIN — Medication 100 MILLIGRAM(S): at 12:08

## 2019-06-18 RX ADMIN — OXYCODONE AND ACETAMINOPHEN 2 TABLET(S): 5; 325 TABLET ORAL at 12:44

## 2019-06-18 RX ADMIN — Medication 25 MILLIGRAM(S): at 06:19

## 2019-06-18 RX ADMIN — HEPARIN SODIUM 5000 UNIT(S): 5000 INJECTION INTRAVENOUS; SUBCUTANEOUS at 14:30

## 2019-06-18 RX ADMIN — OXYCODONE AND ACETAMINOPHEN 2 TABLET(S): 5; 325 TABLET ORAL at 06:23

## 2019-06-18 RX ADMIN — SENNA PLUS 2 TABLET(S): 8.6 TABLET ORAL at 21:29

## 2019-06-18 RX ADMIN — HEPARIN SODIUM 5000 UNIT(S): 5000 INJECTION INTRAVENOUS; SUBCUTANEOUS at 06:19

## 2019-06-18 NOTE — PROGRESS NOTE ADULT - REASON FOR ADMISSION
L sided weakness

## 2019-06-18 NOTE — DISCHARGE NOTE PROVIDER - CARE PROVIDER_API CALL
Chip Khoury)  Neurological Surgery  [address] [place] [address], [address]  [place] [address]  Phone: [phone]  Fax: [phone]  Follow Up Time:

## 2019-06-18 NOTE — PROGRESS NOTE ADULT - SUBJECTIVE AND OBJECTIVE BOX
Subjective: 63yMale with a pmhx of LEFT ARM WEAKNESS  ^HAND NUMBNESS  Handoff  MEWS Score  Back pain  HTN (hypertension)  Myelopathy concurrent with and due to spinal stenosis of cervical region  Myelopathy concurrent with and due to spinal stenosis of cervical region  Left arm weakness  Open insertion of interspinous process spinal stabilization device into joint of cervical vertebra  Laminectomy or decompressive laminectomy, spine, cervical, 3 or more levels  No significant past surgical history  HAND NUMBNESS    POD # 3    S/P C3-7 decompressive lami foraminotomy with lateral mass fusion    pt seen and examined in chair pt states his LUE pain is improved . His strength in his left arm is much improved from pre-op. States he has some residual numbness to the left arm down to fingers but it is improved    Allergies    No Known Allergies    Intolerances        Vital Signs Last 24 Hrs  T(C): 35.9 (18 Jun 2019 06:10), Max: 36.4 (17 Jun 2019 21:40)  T(F): 96.6 (18 Jun 2019 06:10), Max: 97.6 (17 Jun 2019 21:40)  HR: 57 (18 Jun 2019 06:10) (51 - 84)  BP: 148/80 (18 Jun 2019 06:10) (109/61 - 148/80)  RR: 18 (18 Jun 2019 06:10) (18 - 18)        dexamethasone  Injectable 4 milliGRAM(s) IV Push every 6 hours  docusate sodium 100 milliGRAM(s) Oral daily  heparin  Injectable 5000 Unit(s) SubCutaneous every 8 hours  hydrochlorothiazide 25 milliGRAM(s) Oral daily  lisinopril 5 milliGRAM(s) Oral daily  multivitamin 1 Tablet(s) Oral daily  nicotine - 21 mG/24Hr(s) Patch 1 patch Transdermal daily  ondansetron Injectable 4 milliGRAM(s) IV Push every 6 hours PRN  oxyCODONE    5 mG/acetaminophen 325 mG 2 Tablet(s) Oral every 4 hours PRN  pantoprazole    Tablet 40 milliGRAM(s) Oral before breakfast  senna 2 Tablet(s) Oral at bedtime PRN  senna 2 Tablet(s) Oral at bedtime  simvastatin 40 milliGRAM(s) Oral at bedtime        06-17-19 @ 07:01  -  06-18-19 @ 07:00  --------------------------------------------------------  IN: 0 mL / OUT: 2740 mL / NET: -2740 mL          Exam:  AAOX3. Verbal function intact  follows commands  Motor: MAEx4  5/5 power RUE  5/5 LUE  hyperreflexic  Sensation: decreased to LUE  incision dressing clean and dry, ANDREI intact        CBC Full  -  ( 18 Jun 2019 05:39 )  WBC Count : 14.88 K/uL  RBC Count : 3.48 M/uL  Hemoglobin : 10.9 g/dL  Hematocrit : 31.5 %  Platelet Count - Automated : 270 K/uL  Mean Cell Volume : 90.5 fL  Mean Cell Hemoglobin : 31.3 pg  Mean Cell Hemoglobin Concentration : 34.6 g/dL  Auto Neutrophil # : 11.54 K/uL  Auto Lymphocyte # : 1.81 K/uL  Auto Monocyte # : 1.12 K/uL  Auto Eosinophil # : 0.00 K/uL  Auto Basophil # : 0.03 K/uL  Auto Neutrophil % : 77.5 %  Auto Lymphocyte % : 12.2 %  Auto Monocyte % : 7.5 %  Auto Eosinophil % : 0.0 %  Auto Basophil % : 0.2 %    06-18    137  |  100  |  25<H>  ----------------------------<  124<H>  4.4   |  25  |  0.6<L>    Ca    9.0      18 Jun 2019 05:39  Mg     2.2     06-18    TPro  6.3  /  Alb  3.5  /  TBili  0.3  /  DBili  x   /  AST  12  /  ALT  18  /  AlkPhos  59  06-18        Assessment/Plan: as above  will remove ANDREI today  soft collar for comfort  pt/rehab 4A  given fleet enema  d/w attending

## 2019-06-18 NOTE — DISCHARGE NOTE PROVIDER - NSDCCPTREATMENT_GEN_ALL_CORE_FT
PRINCIPAL PROCEDURE  Procedure: Laminectomy or decompressive laminectomy, spine, cervical, 3 or more levels  Findings and Treatment:

## 2019-06-18 NOTE — DISCHARGE NOTE PROVIDER - HOSPITAL COURSE
63 year old  man with past medical history of  Hypertension  and active smoking only,  presents with complaint of L sided weakness and L arm numbness/tingling which he woke up with around 5/24. Pt initially had some L sided neck pain and attributed the symptoms to a pinched nerve. Pt saw chiropractor who did plain films and concurred. 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 Patient denies any headache, any vision complaints, runny nose, fever, chills, sore throat. Denies chest pain, shortness of breath, palpitation. Denies nausea, vomiting, abdominal pain, diarrhoea, Denies urinary burning, urgency, frequency, dysuria.         Pt was transferred from [facility] had an MRI     < from: MR Cervical Spine w/ IV Cont (06.11.19 @ 17:32) >        IMPRESSION:         1.  Degenerative changes of the cervical spine with multilevel disc     osteophyte complexes, bilateral uncinate spurring facet osteoarthritic     changes C2-3 through C7-T1 worse at C3-4 with severe cord compression     with cord edema or myelomalacia with severe right greater than left     neuroforaminal narrowing.        2.  Ventral cord impingement with no cord signal and severe bilateral     neuroforaminal narrowing at C4-5 secondary to broad-based disc osteophyte     complex and bilateral uncinate spurring and facet osteoarthritic changes.        3.  Severe right greater than left neuroforaminal narrowing at C5-6 with     ventral cord flattening secondary to a broad-based disc osteophyte     complex, bilateral uncinate spurringand facet osteoarthritic changes.        4.  Moderate to severe bilateral neuroforaminal narrowing at C6-7 and     mild to moderate neuroforaminal narrowing at C7-T1 with no significant     spinal canal stenosis.        Spoke with JIMMY KRISHNAN on6/12/2019 at 8:39 AM with     readback.        EMMY SCOTT M.D., ATTENDING RADIOLOGIST    This document has been electronically signed. Jun 12 2019 12:16PM        < end of copied text >        Pt was found to have left upper extremity weakness as well as weakness in the left hand . He went to the OR and underwent C3-7 decompressive laminectomy, foraminotomy with lateral mass fixation. He had a ANDREI placed which continued to drain. Pt had Physical therpay and improved daily. He had an increase in  strength and increase in left arm strength since post op. He admits the numbness in the left arm is improved. Pt is doing well, ANDREI was removed and he is cleared for d/c to rehab.

## 2019-06-18 NOTE — DISCHARGE NOTE PROVIDER - NSDCACTIVITY_GEN_ALL_CORE
Walking - Indoors allowed/Walking - Outdoors allowed/Showering allowed/No heavy lifting/straining/Do not drive or operate machinery/Do not make important decisions/Stairs allowed

## 2019-06-19 PROBLEM — M54.9 DORSALGIA, UNSPECIFIED: Chronic | Status: ACTIVE | Noted: 2019-06-10

## 2019-06-19 PROBLEM — I10 ESSENTIAL (PRIMARY) HYPERTENSION: Chronic | Status: ACTIVE | Noted: 2019-06-10

## 2019-06-19 RX ADMIN — Medication 100 MILLIGRAM(S): at 05:29

## 2019-06-19 RX ADMIN — Medication 4 MILLIGRAM(S): at 21:31

## 2019-06-19 RX ADMIN — OXYCODONE AND ACETAMINOPHEN 2 TABLET(S): 5; 325 TABLET ORAL at 21:31

## 2019-06-19 RX ADMIN — Medication 1 PATCH: at 21:36

## 2019-06-19 RX ADMIN — OXYCODONE AND ACETAMINOPHEN 2 TABLET(S): 5; 325 TABLET ORAL at 05:56

## 2019-06-19 RX ADMIN — OXYCODONE AND ACETAMINOPHEN 2 TABLET(S): 5; 325 TABLET ORAL at 12:14

## 2019-06-19 RX ADMIN — HEPARIN SODIUM 5000 UNIT(S): 5000 INJECTION INTRAVENOUS; SUBCUTANEOUS at 12:10

## 2019-06-19 RX ADMIN — Medication 100 MILLIGRAM(S): at 17:06

## 2019-06-19 RX ADMIN — SIMVASTATIN 40 MILLIGRAM(S): 20 TABLET, FILM COATED ORAL at 21:31

## 2019-06-19 RX ADMIN — SENNA PLUS 2 TABLET(S): 8.6 TABLET ORAL at 21:31

## 2019-06-19 RX ADMIN — Medication 4 MILLIGRAM(S): at 05:30

## 2019-06-19 RX ADMIN — OXYCODONE AND ACETAMINOPHEN 2 TABLET(S): 5; 325 TABLET ORAL at 17:06

## 2019-06-19 RX ADMIN — Medication 1 PATCH: at 12:10

## 2019-06-19 RX ADMIN — Medication 25 MILLIGRAM(S): at 05:29

## 2019-06-19 RX ADMIN — PANTOPRAZOLE SODIUM 40 MILLIGRAM(S): 20 TABLET, DELAYED RELEASE ORAL at 06:05

## 2019-06-19 RX ADMIN — LISINOPRIL 5 MILLIGRAM(S): 2.5 TABLET ORAL at 05:29

## 2019-06-19 RX ADMIN — HEPARIN SODIUM 5000 UNIT(S): 5000 INJECTION INTRAVENOUS; SUBCUTANEOUS at 21:31

## 2019-06-19 RX ADMIN — Medication 4 MILLIGRAM(S): at 12:11

## 2019-06-19 RX ADMIN — OXYCODONE AND ACETAMINOPHEN 2 TABLET(S): 5; 325 TABLET ORAL at 05:28

## 2019-06-19 RX ADMIN — Medication 1 TABLET(S): at 12:10

## 2019-06-19 RX ADMIN — HEPARIN SODIUM 5000 UNIT(S): 5000 INJECTION INTRAVENOUS; SUBCUTANEOUS at 05:31

## 2019-06-20 LAB
ALBUMIN SERPL ELPH-MCNC: 3.5 G/DL — SIGNIFICANT CHANGE UP (ref 3.5–5.2)
ALP SERPL-CCNC: 66 U/L — SIGNIFICANT CHANGE UP (ref 30–115)
ALT FLD-CCNC: 22 U/L — SIGNIFICANT CHANGE UP (ref 0–41)
ANION GAP SERPL CALC-SCNC: 11 MMOL/L — SIGNIFICANT CHANGE UP (ref 7–14)
AST SERPL-CCNC: 15 U/L — SIGNIFICANT CHANGE UP (ref 0–41)
BILIRUB SERPL-MCNC: 0.3 MG/DL — SIGNIFICANT CHANGE UP (ref 0.2–1.2)
BUN SERPL-MCNC: 23 MG/DL — HIGH (ref 10–20)
CALCIUM SERPL-MCNC: 9.1 MG/DL — SIGNIFICANT CHANGE UP (ref 8.5–10.1)
CHLORIDE SERPL-SCNC: 101 MMOL/L — SIGNIFICANT CHANGE UP (ref 98–110)
CO2 SERPL-SCNC: 24 MMOL/L — SIGNIFICANT CHANGE UP (ref 17–32)
CREAT SERPL-MCNC: 0.6 MG/DL — LOW (ref 0.7–1.5)
GLUCOSE SERPL-MCNC: 115 MG/DL — HIGH (ref 70–99)
HCT VFR BLD CALC: 32.9 % — LOW (ref 42–52)
HGB BLD-MCNC: 11.4 G/DL — LOW (ref 14–18)
MAGNESIUM SERPL-MCNC: 2.2 MG/DL — SIGNIFICANT CHANGE UP (ref 1.8–2.4)
MCHC RBC-ENTMCNC: 31.6 PG — HIGH (ref 27–31)
MCHC RBC-ENTMCNC: 34.7 G/DL — SIGNIFICANT CHANGE UP (ref 32–37)
MCV RBC AUTO: 91.1 FL — SIGNIFICANT CHANGE UP (ref 80–94)
NRBC # BLD: 0 /100 WBCS — SIGNIFICANT CHANGE UP (ref 0–0)
PLATELET # BLD AUTO: 295 K/UL — SIGNIFICANT CHANGE UP (ref 130–400)
POTASSIUM SERPL-MCNC: 4.6 MMOL/L — SIGNIFICANT CHANGE UP (ref 3.5–5)
POTASSIUM SERPL-SCNC: 4.6 MMOL/L — SIGNIFICANT CHANGE UP (ref 3.5–5)
PROT SERPL-MCNC: 6.3 G/DL — SIGNIFICANT CHANGE UP (ref 6–8)
RBC # BLD: 3.61 M/UL — LOW (ref 4.7–6.1)
RBC # FLD: 13.8 % — SIGNIFICANT CHANGE UP (ref 11.5–14.5)
SODIUM SERPL-SCNC: 136 MMOL/L — SIGNIFICANT CHANGE UP (ref 135–146)
WBC # BLD: 18.1 K/UL — HIGH (ref 4.8–10.8)
WBC # FLD AUTO: 18.1 K/UL — HIGH (ref 4.8–10.8)

## 2019-06-20 RX ORDER — DOCUSATE SODIUM 100 MG
100 CAPSULE ORAL THREE TIMES A DAY
Refills: 0 | Status: DISCONTINUED | OUTPATIENT
Start: 2019-06-20 | End: 2019-07-01

## 2019-06-20 RX ORDER — POLYETHYLENE GLYCOL 3350 17 G/17G
17 POWDER, FOR SOLUTION ORAL
Refills: 0 | Status: DISCONTINUED | OUTPATIENT
Start: 2019-06-20 | End: 2019-07-01

## 2019-06-20 RX ADMIN — OXYCODONE AND ACETAMINOPHEN 2 TABLET(S): 5; 325 TABLET ORAL at 13:01

## 2019-06-20 RX ADMIN — OXYCODONE AND ACETAMINOPHEN 2 TABLET(S): 5; 325 TABLET ORAL at 12:42

## 2019-06-20 RX ADMIN — Medication 1 PATCH: at 11:45

## 2019-06-20 RX ADMIN — Medication 1 PATCH: at 21:56

## 2019-06-20 RX ADMIN — Medication 4 MILLIGRAM(S): at 05:52

## 2019-06-20 RX ADMIN — OXYCODONE AND ACETAMINOPHEN 2 TABLET(S): 5; 325 TABLET ORAL at 21:19

## 2019-06-20 RX ADMIN — Medication 1 TABLET(S): at 11:45

## 2019-06-20 RX ADMIN — LISINOPRIL 5 MILLIGRAM(S): 2.5 TABLET ORAL at 05:53

## 2019-06-20 RX ADMIN — SENNA PLUS 2 TABLET(S): 8.6 TABLET ORAL at 21:21

## 2019-06-20 RX ADMIN — OXYCODONE AND ACETAMINOPHEN 2 TABLET(S): 5; 325 TABLET ORAL at 21:23

## 2019-06-20 RX ADMIN — HEPARIN SODIUM 5000 UNIT(S): 5000 INJECTION INTRAVENOUS; SUBCUTANEOUS at 12:49

## 2019-06-20 RX ADMIN — OXYCODONE AND ACETAMINOPHEN 2 TABLET(S): 5; 325 TABLET ORAL at 17:16

## 2019-06-20 RX ADMIN — HEPARIN SODIUM 5000 UNIT(S): 5000 INJECTION INTRAVENOUS; SUBCUTANEOUS at 05:53

## 2019-06-20 RX ADMIN — OXYCODONE AND ACETAMINOPHEN 2 TABLET(S): 5; 325 TABLET ORAL at 07:48

## 2019-06-20 RX ADMIN — SIMVASTATIN 40 MILLIGRAM(S): 20 TABLET, FILM COATED ORAL at 21:21

## 2019-06-20 RX ADMIN — POLYETHYLENE GLYCOL 3350 17 GRAM(S): 17 POWDER, FOR SOLUTION ORAL at 17:17

## 2019-06-20 RX ADMIN — Medication 100 MILLIGRAM(S): at 05:52

## 2019-06-20 RX ADMIN — Medication 100 MILLIGRAM(S): at 21:20

## 2019-06-20 RX ADMIN — HEPARIN SODIUM 5000 UNIT(S): 5000 INJECTION INTRAVENOUS; SUBCUTANEOUS at 21:21

## 2019-06-20 RX ADMIN — PANTOPRAZOLE SODIUM 40 MILLIGRAM(S): 20 TABLET, DELAYED RELEASE ORAL at 05:54

## 2019-06-20 RX ADMIN — Medication 4 MILLIGRAM(S): at 11:47

## 2019-06-21 LAB
FOLATE SERPL-MCNC: 9.4 NG/ML — SIGNIFICANT CHANGE UP
HCT VFR BLD CALC: 38.3 % — LOW (ref 39–50)
VIT B12 SERPL-MCNC: 455 PG/ML — SIGNIFICANT CHANGE UP (ref 232–1245)

## 2019-06-21 RX ADMIN — OXYCODONE AND ACETAMINOPHEN 2 TABLET(S): 5; 325 TABLET ORAL at 23:54

## 2019-06-21 RX ADMIN — LISINOPRIL 5 MILLIGRAM(S): 2.5 TABLET ORAL at 05:42

## 2019-06-21 RX ADMIN — Medication 4 MILLIGRAM(S): at 19:08

## 2019-06-21 RX ADMIN — Medication 1 PATCH: at 11:49

## 2019-06-21 RX ADMIN — Medication 1 TABLET(S): at 11:51

## 2019-06-21 RX ADMIN — OXYCODONE AND ACETAMINOPHEN 2 TABLET(S): 5; 325 TABLET ORAL at 07:54

## 2019-06-21 RX ADMIN — SIMVASTATIN 40 MILLIGRAM(S): 20 TABLET, FILM COATED ORAL at 21:28

## 2019-06-21 RX ADMIN — Medication 1 PATCH: at 12:00

## 2019-06-21 RX ADMIN — OXYCODONE AND ACETAMINOPHEN 2 TABLET(S): 5; 325 TABLET ORAL at 19:11

## 2019-06-21 RX ADMIN — HEPARIN SODIUM 5000 UNIT(S): 5000 INJECTION INTRAVENOUS; SUBCUTANEOUS at 05:43

## 2019-06-21 RX ADMIN — OXYCODONE AND ACETAMINOPHEN 2 TABLET(S): 5; 325 TABLET ORAL at 03:11

## 2019-06-21 RX ADMIN — PANTOPRAZOLE SODIUM 40 MILLIGRAM(S): 20 TABLET, DELAYED RELEASE ORAL at 05:44

## 2019-06-21 RX ADMIN — Medication 1 PATCH: at 06:16

## 2019-06-21 RX ADMIN — Medication 1 PATCH: at 12:10

## 2019-06-21 RX ADMIN — Medication 100 MILLIGRAM(S): at 12:43

## 2019-06-21 RX ADMIN — POLYETHYLENE GLYCOL 3350 17 GRAM(S): 17 POWDER, FOR SOLUTION ORAL at 19:08

## 2019-06-21 RX ADMIN — HEPARIN SODIUM 5000 UNIT(S): 5000 INJECTION INTRAVENOUS; SUBCUTANEOUS at 12:56

## 2019-06-21 RX ADMIN — OXYCODONE AND ACETAMINOPHEN 2 TABLET(S): 5; 325 TABLET ORAL at 11:57

## 2019-06-21 RX ADMIN — Medication 100 MILLIGRAM(S): at 05:42

## 2019-06-21 RX ADMIN — Medication 4 MILLIGRAM(S): at 05:42

## 2019-06-22 LAB
APPEARANCE UR: CLEAR — SIGNIFICANT CHANGE UP
BILIRUB UR-MCNC: NEGATIVE — SIGNIFICANT CHANGE UP
COLOR SPEC: YELLOW — SIGNIFICANT CHANGE UP
DIFF PNL FLD: NEGATIVE — SIGNIFICANT CHANGE UP
GLUCOSE UR QL: NEGATIVE MG/DL — SIGNIFICANT CHANGE UP
KETONES UR-MCNC: NEGATIVE — SIGNIFICANT CHANGE UP
LEUKOCYTE ESTERASE UR-ACNC: NEGATIVE — SIGNIFICANT CHANGE UP
NITRITE UR-MCNC: NEGATIVE — SIGNIFICANT CHANGE UP
PH UR: 6.5 — SIGNIFICANT CHANGE UP (ref 5–8)
PROT UR-MCNC: NEGATIVE MG/DL — SIGNIFICANT CHANGE UP
RPR SER-TITR: SIGNIFICANT CHANGE UP
SP GR SPEC: 1.02 — SIGNIFICANT CHANGE UP (ref 1.01–1.03)
UROBILINOGEN FLD QL: 1 MG/DL (ref 0.2–0.2)

## 2019-06-22 RX ADMIN — Medication 4 MILLIGRAM(S): at 05:52

## 2019-06-22 RX ADMIN — Medication 1 PATCH: at 08:02

## 2019-06-22 RX ADMIN — PANTOPRAZOLE SODIUM 40 MILLIGRAM(S): 20 TABLET, DELAYED RELEASE ORAL at 05:52

## 2019-06-22 RX ADMIN — OXYCODONE AND ACETAMINOPHEN 2 TABLET(S): 5; 325 TABLET ORAL at 11:20

## 2019-06-22 RX ADMIN — OXYCODONE AND ACETAMINOPHEN 2 TABLET(S): 5; 325 TABLET ORAL at 19:54

## 2019-06-22 RX ADMIN — OXYCODONE AND ACETAMINOPHEN 2 TABLET(S): 5; 325 TABLET ORAL at 17:24

## 2019-06-22 RX ADMIN — Medication 1 PATCH: at 11:21

## 2019-06-22 RX ADMIN — OXYCODONE AND ACETAMINOPHEN 2 TABLET(S): 5; 325 TABLET ORAL at 21:59

## 2019-06-22 RX ADMIN — Medication 1 PATCH: at 11:14

## 2019-06-22 RX ADMIN — SIMVASTATIN 40 MILLIGRAM(S): 20 TABLET, FILM COATED ORAL at 21:40

## 2019-06-22 RX ADMIN — Medication 100 MILLIGRAM(S): at 05:52

## 2019-06-22 RX ADMIN — OXYCODONE AND ACETAMINOPHEN 2 TABLET(S): 5; 325 TABLET ORAL at 05:58

## 2019-06-22 RX ADMIN — LISINOPRIL 5 MILLIGRAM(S): 2.5 TABLET ORAL at 05:52

## 2019-06-22 RX ADMIN — HEPARIN SODIUM 5000 UNIT(S): 5000 INJECTION INTRAVENOUS; SUBCUTANEOUS at 05:53

## 2019-06-22 RX ADMIN — Medication 1 TABLET(S): at 11:21

## 2019-06-22 RX ADMIN — SENNA PLUS 2 TABLET(S): 8.6 TABLET ORAL at 21:41

## 2019-06-22 RX ADMIN — HEPARIN SODIUM 5000 UNIT(S): 5000 INJECTION INTRAVENOUS; SUBCUTANEOUS at 21:41

## 2019-06-22 RX ADMIN — OXYCODONE AND ACETAMINOPHEN 2 TABLET(S): 5; 325 TABLET ORAL at 13:46

## 2019-06-22 RX ADMIN — HEPARIN SODIUM 5000 UNIT(S): 5000 INJECTION INTRAVENOUS; SUBCUTANEOUS at 13:41

## 2019-06-22 RX ADMIN — OXYCODONE AND ACETAMINOPHEN 2 TABLET(S): 5; 325 TABLET ORAL at 11:15

## 2019-06-22 RX ADMIN — Medication 100 MILLIGRAM(S): at 21:40

## 2019-06-22 RX ADMIN — Medication 1 PATCH: at 19:55

## 2019-06-22 RX ADMIN — Medication 100 MILLIGRAM(S): at 13:41

## 2019-06-22 RX ADMIN — Medication 4 MILLIGRAM(S): at 17:23

## 2019-06-23 LAB — FOLATE RBC-MCNC: 1538 NG/ML — HIGH (ref 499–1504)

## 2019-06-23 RX ADMIN — Medication 2 MILLIGRAM(S): at 05:56

## 2019-06-23 RX ADMIN — HEPARIN SODIUM 5000 UNIT(S): 5000 INJECTION INTRAVENOUS; SUBCUTANEOUS at 05:55

## 2019-06-23 RX ADMIN — LISINOPRIL 5 MILLIGRAM(S): 2.5 TABLET ORAL at 05:54

## 2019-06-23 RX ADMIN — Medication 1 PATCH: at 06:43

## 2019-06-23 RX ADMIN — SENNA PLUS 2 TABLET(S): 8.6 TABLET ORAL at 21:26

## 2019-06-23 RX ADMIN — PANTOPRAZOLE SODIUM 40 MILLIGRAM(S): 20 TABLET, DELAYED RELEASE ORAL at 05:54

## 2019-06-23 RX ADMIN — Medication 1 PATCH: at 12:34

## 2019-06-23 RX ADMIN — OXYCODONE AND ACETAMINOPHEN 2 TABLET(S): 5; 325 TABLET ORAL at 22:41

## 2019-06-23 RX ADMIN — OXYCODONE AND ACETAMINOPHEN 2 TABLET(S): 5; 325 TABLET ORAL at 13:12

## 2019-06-23 RX ADMIN — OXYCODONE AND ACETAMINOPHEN 2 TABLET(S): 5; 325 TABLET ORAL at 14:00

## 2019-06-23 RX ADMIN — Medication 1 TABLET(S): at 12:33

## 2019-06-23 RX ADMIN — OXYCODONE AND ACETAMINOPHEN 2 TABLET(S): 5; 325 TABLET ORAL at 09:35

## 2019-06-23 RX ADMIN — OXYCODONE AND ACETAMINOPHEN 2 TABLET(S): 5; 325 TABLET ORAL at 17:57

## 2019-06-23 RX ADMIN — Medication 1 PATCH: at 12:30

## 2019-06-23 RX ADMIN — Medication 100 MILLIGRAM(S): at 05:54

## 2019-06-23 RX ADMIN — HEPARIN SODIUM 5000 UNIT(S): 5000 INJECTION INTRAVENOUS; SUBCUTANEOUS at 21:26

## 2019-06-23 RX ADMIN — Medication 2 MILLIGRAM(S): at 17:16

## 2019-06-23 RX ADMIN — OXYCODONE AND ACETAMINOPHEN 2 TABLET(S): 5; 325 TABLET ORAL at 08:31

## 2019-06-23 RX ADMIN — HEPARIN SODIUM 5000 UNIT(S): 5000 INJECTION INTRAVENOUS; SUBCUTANEOUS at 13:16

## 2019-06-23 RX ADMIN — Medication 1 PATCH: at 21:26

## 2019-06-23 RX ADMIN — Medication 100 MILLIGRAM(S): at 21:26

## 2019-06-23 RX ADMIN — SIMVASTATIN 40 MILLIGRAM(S): 20 TABLET, FILM COATED ORAL at 21:25

## 2019-06-24 DIAGNOSIS — F17.200 NICOTINE DEPENDENCE, UNSPECIFIED, UNCOMPLICATED: ICD-10-CM

## 2019-06-24 DIAGNOSIS — R26.1 PARALYTIC GAIT: ICD-10-CM

## 2019-06-24 DIAGNOSIS — M48.02 SPINAL STENOSIS, CERVICAL REGION: ICD-10-CM

## 2019-06-24 DIAGNOSIS — K59.00 CONSTIPATION, UNSPECIFIED: ICD-10-CM

## 2019-06-24 DIAGNOSIS — T83.83XA HEMORRHAGE DUE TO GENITOURINARY PROSTHETIC DEVICES, IMPLANTS AND GRAFTS, INITIAL ENCOUNTER: ICD-10-CM

## 2019-06-24 DIAGNOSIS — I10 ESSENTIAL (PRIMARY) HYPERTENSION: ICD-10-CM

## 2019-06-24 DIAGNOSIS — G99.2 MYELOPATHY IN DISEASES CLASSIFIED ELSEWHERE: ICD-10-CM

## 2019-06-24 DIAGNOSIS — M47.892 OTHER SPONDYLOSIS, CERVICAL REGION: ICD-10-CM

## 2019-06-24 DIAGNOSIS — G81.94 HEMIPLEGIA, UNSPECIFIED AFFECTING LEFT NONDOMINANT SIDE: ICD-10-CM

## 2019-06-24 DIAGNOSIS — R20.0 ANESTHESIA OF SKIN: ICD-10-CM

## 2019-06-24 DIAGNOSIS — R31.9 HEMATURIA, UNSPECIFIED: ICD-10-CM

## 2019-06-24 DIAGNOSIS — G95.29 OTHER CORD COMPRESSION: ICD-10-CM
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
## 2019-06-24 DIAGNOSIS — R79.89 OTHER SPECIFIED ABNORMAL FINDINGS OF BLOOD CHEMISTRY: ICD-10-CM

## 2019-06-24 LAB
ANION GAP SERPL CALC-SCNC: 13 MMOL/L — SIGNIFICANT CHANGE UP (ref 7–14)
BUN SERPL-MCNC: 21 MG/DL — HIGH (ref 10–20)
CALCIUM SERPL-MCNC: 9.2 MG/DL — SIGNIFICANT CHANGE UP (ref 8.5–10.1)
CHLORIDE SERPL-SCNC: 99 MMOL/L — SIGNIFICANT CHANGE UP (ref 98–110)
CO2 SERPL-SCNC: 24 MMOL/L — SIGNIFICANT CHANGE UP (ref 17–32)
CREAT SERPL-MCNC: 0.7 MG/DL — SIGNIFICANT CHANGE UP (ref 0.7–1.5)
GLUCOSE SERPL-MCNC: 107 MG/DL — HIGH (ref 70–99)
HCT VFR BLD CALC: 34.1 % — LOW (ref 42–52)
HGB BLD-MCNC: 11.9 G/DL — LOW (ref 14–18)
MCHC RBC-ENTMCNC: 32 PG — HIGH (ref 27–31)
MCHC RBC-ENTMCNC: 34.9 G/DL — SIGNIFICANT CHANGE UP (ref 32–37)
MCV RBC AUTO: 91.7 FL — SIGNIFICANT CHANGE UP (ref 80–94)
NRBC # BLD: 0 /100 WBCS — SIGNIFICANT CHANGE UP (ref 0–0)
PLATELET # BLD AUTO: 345 K/UL — SIGNIFICANT CHANGE UP (ref 130–400)
POTASSIUM SERPL-MCNC: 4.6 MMOL/L — SIGNIFICANT CHANGE UP (ref 3.5–5)
POTASSIUM SERPL-SCNC: 4.6 MMOL/L — SIGNIFICANT CHANGE UP (ref 3.5–5)
RBC # BLD: 3.72 M/UL — LOW (ref 4.7–6.1)
RBC # FLD: 14.6 % — HIGH (ref 11.5–14.5)
SODIUM SERPL-SCNC: 136 MMOL/L — SIGNIFICANT CHANGE UP (ref 135–146)
WBC # BLD: 13.01 K/UL — HIGH (ref 4.8–10.8)
WBC # FLD AUTO: 13.01 K/UL — HIGH (ref 4.8–10.8)

## 2019-06-24 RX ORDER — OXYCODONE AND ACETAMINOPHEN 5; 325 MG/1; MG/1
2 TABLET ORAL EVERY 4 HOURS
Refills: 0 | Status: DISCONTINUED | OUTPATIENT
Start: 2019-06-24 | End: 2019-07-01

## 2019-06-24 RX ADMIN — Medication 1 PATCH: at 11:08

## 2019-06-24 RX ADMIN — HEPARIN SODIUM 5000 UNIT(S): 5000 INJECTION INTRAVENOUS; SUBCUTANEOUS at 05:07

## 2019-06-24 RX ADMIN — OXYCODONE AND ACETAMINOPHEN 2 TABLET(S): 5; 325 TABLET ORAL at 17:23

## 2019-06-24 RX ADMIN — Medication 100 MILLIGRAM(S): at 21:43

## 2019-06-24 RX ADMIN — HEPARIN SODIUM 5000 UNIT(S): 5000 INJECTION INTRAVENOUS; SUBCUTANEOUS at 21:43

## 2019-06-24 RX ADMIN — Medication 1 PATCH: at 21:44

## 2019-06-24 RX ADMIN — PANTOPRAZOLE SODIUM 40 MILLIGRAM(S): 20 TABLET, DELAYED RELEASE ORAL at 06:09

## 2019-06-24 RX ADMIN — HEPARIN SODIUM 5000 UNIT(S): 5000 INJECTION INTRAVENOUS; SUBCUTANEOUS at 12:58

## 2019-06-24 RX ADMIN — POLYETHYLENE GLYCOL 3350 17 GRAM(S): 17 POWDER, FOR SOLUTION ORAL at 17:24

## 2019-06-24 RX ADMIN — SIMVASTATIN 40 MILLIGRAM(S): 20 TABLET, FILM COATED ORAL at 21:43

## 2019-06-24 RX ADMIN — OXYCODONE AND ACETAMINOPHEN 2 TABLET(S): 5; 325 TABLET ORAL at 18:32

## 2019-06-24 RX ADMIN — OXYCODONE AND ACETAMINOPHEN 2 TABLET(S): 5; 325 TABLET ORAL at 12:56

## 2019-06-24 RX ADMIN — Medication 2 MILLIGRAM(S): at 05:07

## 2019-06-24 RX ADMIN — OXYCODONE AND ACETAMINOPHEN 2 TABLET(S): 5; 325 TABLET ORAL at 21:43

## 2019-06-24 RX ADMIN — OXYCODONE AND ACETAMINOPHEN 2 TABLET(S): 5; 325 TABLET ORAL at 02:47

## 2019-06-24 RX ADMIN — Medication 100 MILLIGRAM(S): at 12:57

## 2019-06-24 RX ADMIN — OXYCODONE AND ACETAMINOPHEN 2 TABLET(S): 5; 325 TABLET ORAL at 07:55

## 2019-06-24 RX ADMIN — Medication 100 MILLIGRAM(S): at 05:07

## 2019-06-24 RX ADMIN — OXYCODONE AND ACETAMINOPHEN 2 TABLET(S): 5; 325 TABLET ORAL at 22:38

## 2019-06-24 RX ADMIN — Medication 1 TABLET(S): at 11:08

## 2019-06-24 RX ADMIN — LISINOPRIL 5 MILLIGRAM(S): 2.5 TABLET ORAL at 05:06

## 2019-06-24 RX ADMIN — Medication 2 MILLIGRAM(S): at 17:25

## 2019-06-24 RX ADMIN — OXYCODONE AND ACETAMINOPHEN 2 TABLET(S): 5; 325 TABLET ORAL at 08:00

## 2019-06-24 RX ADMIN — Medication 1 PATCH: at 11:09

## 2019-06-25 RX ADMIN — OXYCODONE AND ACETAMINOPHEN 2 TABLET(S): 5; 325 TABLET ORAL at 17:19

## 2019-06-25 RX ADMIN — OXYCODONE AND ACETAMINOPHEN 2 TABLET(S): 5; 325 TABLET ORAL at 06:16

## 2019-06-25 RX ADMIN — OXYCODONE AND ACETAMINOPHEN 2 TABLET(S): 5; 325 TABLET ORAL at 21:24

## 2019-06-25 RX ADMIN — Medication 1 MILLIGRAM(S): at 06:11

## 2019-06-25 RX ADMIN — Medication 1 PATCH: at 12:24

## 2019-06-25 RX ADMIN — LISINOPRIL 5 MILLIGRAM(S): 2.5 TABLET ORAL at 05:37

## 2019-06-25 RX ADMIN — OXYCODONE AND ACETAMINOPHEN 2 TABLET(S): 5; 325 TABLET ORAL at 13:36

## 2019-06-25 RX ADMIN — Medication 1 PATCH: at 20:20

## 2019-06-25 RX ADMIN — SIMVASTATIN 40 MILLIGRAM(S): 20 TABLET, FILM COATED ORAL at 21:24

## 2019-06-25 RX ADMIN — Medication 100 MILLIGRAM(S): at 21:24

## 2019-06-25 RX ADMIN — Medication 100 MILLIGRAM(S): at 13:35

## 2019-06-25 RX ADMIN — SENNA PLUS 2 TABLET(S): 8.6 TABLET ORAL at 21:24

## 2019-06-25 RX ADMIN — PANTOPRAZOLE SODIUM 40 MILLIGRAM(S): 20 TABLET, DELAYED RELEASE ORAL at 06:11

## 2019-06-25 RX ADMIN — HEPARIN SODIUM 5000 UNIT(S): 5000 INJECTION INTRAVENOUS; SUBCUTANEOUS at 21:25

## 2019-06-25 RX ADMIN — Medication 1 PATCH: at 06:11

## 2019-06-25 RX ADMIN — Medication 100 MILLIGRAM(S): at 05:38

## 2019-06-25 RX ADMIN — Medication 1 MILLIGRAM(S): at 17:16

## 2019-06-25 RX ADMIN — HEPARIN SODIUM 5000 UNIT(S): 5000 INJECTION INTRAVENOUS; SUBCUTANEOUS at 05:38

## 2019-06-25 RX ADMIN — Medication 1 TABLET(S): at 12:33

## 2019-06-25 RX ADMIN — Medication 1 PATCH: at 12:33

## 2019-06-25 RX ADMIN — OXYCODONE AND ACETAMINOPHEN 2 TABLET(S): 5; 325 TABLET ORAL at 05:37

## 2019-06-25 RX ADMIN — OXYCODONE AND ACETAMINOPHEN 2 TABLET(S): 5; 325 TABLET ORAL at 12:37

## 2019-06-25 RX ADMIN — HEPARIN SODIUM 5000 UNIT(S): 5000 INJECTION INTRAVENOUS; SUBCUTANEOUS at 13:35

## 2019-06-26 RX ADMIN — Medication 1 PATCH: at 12:20

## 2019-06-26 RX ADMIN — Medication 1 TABLET(S): at 12:20

## 2019-06-26 RX ADMIN — PANTOPRAZOLE SODIUM 40 MILLIGRAM(S): 20 TABLET, DELAYED RELEASE ORAL at 06:12

## 2019-06-26 RX ADMIN — Medication 1 PATCH: at 08:22

## 2019-06-26 RX ADMIN — SIMVASTATIN 40 MILLIGRAM(S): 20 TABLET, FILM COATED ORAL at 21:56

## 2019-06-26 RX ADMIN — Medication 100 MILLIGRAM(S): at 13:19

## 2019-06-26 RX ADMIN — Medication 1 MILLIGRAM(S): at 17:13

## 2019-06-26 RX ADMIN — Medication 1 MILLIGRAM(S): at 06:12

## 2019-06-26 RX ADMIN — Medication 100 MILLIGRAM(S): at 21:56

## 2019-06-26 RX ADMIN — LISINOPRIL 5 MILLIGRAM(S): 2.5 TABLET ORAL at 06:12

## 2019-06-26 RX ADMIN — HEPARIN SODIUM 5000 UNIT(S): 5000 INJECTION INTRAVENOUS; SUBCUTANEOUS at 06:12

## 2019-06-26 RX ADMIN — SENNA PLUS 2 TABLET(S): 8.6 TABLET ORAL at 21:56

## 2019-06-26 RX ADMIN — HEPARIN SODIUM 5000 UNIT(S): 5000 INJECTION INTRAVENOUS; SUBCUTANEOUS at 21:56

## 2019-06-26 RX ADMIN — Medication 100 MILLIGRAM(S): at 06:12

## 2019-06-26 RX ADMIN — OXYCODONE AND ACETAMINOPHEN 2 TABLET(S): 5; 325 TABLET ORAL at 22:01

## 2019-06-26 RX ADMIN — Medication 1 PATCH: at 14:27

## 2019-06-26 RX ADMIN — OXYCODONE AND ACETAMINOPHEN 2 TABLET(S): 5; 325 TABLET ORAL at 17:17

## 2019-06-26 RX ADMIN — OXYCODONE AND ACETAMINOPHEN 2 TABLET(S): 5; 325 TABLET ORAL at 13:23

## 2019-06-26 RX ADMIN — OXYCODONE AND ACETAMINOPHEN 2 TABLET(S): 5; 325 TABLET ORAL at 12:24

## 2019-06-26 RX ADMIN — POLYETHYLENE GLYCOL 3350 17 GRAM(S): 17 POWDER, FOR SOLUTION ORAL at 17:13

## 2019-06-26 RX ADMIN — HEPARIN SODIUM 5000 UNIT(S): 5000 INJECTION INTRAVENOUS; SUBCUTANEOUS at 13:19

## 2019-06-26 RX ADMIN — OXYCODONE AND ACETAMINOPHEN 2 TABLET(S): 5; 325 TABLET ORAL at 08:21

## 2019-06-27 RX ORDER — LIDOCAINE 4 G/100G
1 CREAM TOPICAL ONCE
Refills: 0 | Status: COMPLETED | OUTPATIENT
Start: 2019-06-27 | End: 2019-06-27

## 2019-06-27 RX ORDER — LIDOCAINE 4 G/100G
1 CREAM TOPICAL
Refills: 0 | Status: DISCONTINUED | OUTPATIENT
Start: 2019-06-27 | End: 2019-07-03

## 2019-06-27 RX ORDER — METHOCARBAMOL 500 MG/1
750 TABLET, FILM COATED ORAL EVERY 8 HOURS
Refills: 0 | Status: DISCONTINUED | OUTPATIENT
Start: 2019-06-27 | End: 2019-07-03

## 2019-06-27 RX ADMIN — LISINOPRIL 5 MILLIGRAM(S): 2.5 TABLET ORAL at 05:43

## 2019-06-27 RX ADMIN — HEPARIN SODIUM 5000 UNIT(S): 5000 INJECTION INTRAVENOUS; SUBCUTANEOUS at 21:26

## 2019-06-27 RX ADMIN — Medication 100 MILLIGRAM(S): at 21:26

## 2019-06-27 RX ADMIN — METHOCARBAMOL 750 MILLIGRAM(S): 500 TABLET, FILM COATED ORAL at 21:26

## 2019-06-27 RX ADMIN — Medication 100 MILLIGRAM(S): at 05:43

## 2019-06-27 RX ADMIN — SENNA PLUS 2 TABLET(S): 8.6 TABLET ORAL at 21:26

## 2019-06-27 RX ADMIN — OXYCODONE AND ACETAMINOPHEN 2 TABLET(S): 5; 325 TABLET ORAL at 13:40

## 2019-06-27 RX ADMIN — OXYCODONE AND ACETAMINOPHEN 2 TABLET(S): 5; 325 TABLET ORAL at 21:17

## 2019-06-27 RX ADMIN — OXYCODONE AND ACETAMINOPHEN 2 TABLET(S): 5; 325 TABLET ORAL at 19:08

## 2019-06-27 RX ADMIN — Medication 1 PATCH: at 12:44

## 2019-06-27 RX ADMIN — Medication 1 TABLET(S): at 12:43

## 2019-06-27 RX ADMIN — Medication 1 PATCH: at 10:16

## 2019-06-27 RX ADMIN — OXYCODONE AND ACETAMINOPHEN 2 TABLET(S): 5; 325 TABLET ORAL at 11:55

## 2019-06-27 RX ADMIN — OXYCODONE AND ACETAMINOPHEN 2 TABLET(S): 5; 325 TABLET ORAL at 16:40

## 2019-06-27 RX ADMIN — PANTOPRAZOLE SODIUM 40 MILLIGRAM(S): 20 TABLET, DELAYED RELEASE ORAL at 05:44

## 2019-06-27 RX ADMIN — HEPARIN SODIUM 5000 UNIT(S): 5000 INJECTION INTRAVENOUS; SUBCUTANEOUS at 05:43

## 2019-06-27 RX ADMIN — SIMVASTATIN 40 MILLIGRAM(S): 20 TABLET, FILM COATED ORAL at 21:26

## 2019-06-27 RX ADMIN — Medication 1 PATCH: at 21:18

## 2019-06-27 RX ADMIN — Medication 1 PATCH: at 13:40

## 2019-06-27 RX ADMIN — LIDOCAINE 1 PATCH: 4 CREAM TOPICAL at 21:27

## 2019-06-27 RX ADMIN — OXYCODONE AND ACETAMINOPHEN 2 TABLET(S): 5; 325 TABLET ORAL at 10:17

## 2019-06-27 RX ADMIN — OXYCODONE AND ACETAMINOPHEN 2 TABLET(S): 5; 325 TABLET ORAL at 02:54

## 2019-06-27 RX ADMIN — OXYCODONE AND ACETAMINOPHEN 2 TABLET(S): 5; 325 TABLET ORAL at 07:44

## 2019-06-27 RX ADMIN — Medication 100 MILLIGRAM(S): at 13:39

## 2019-06-27 RX ADMIN — HEPARIN SODIUM 5000 UNIT(S): 5000 INJECTION INTRAVENOUS; SUBCUTANEOUS at 13:39

## 2019-06-27 RX ADMIN — Medication 1 MILLIGRAM(S): at 05:43

## 2019-06-28 LAB
ANION GAP SERPL CALC-SCNC: 12 MMOL/L — SIGNIFICANT CHANGE UP (ref 7–14)
BUN SERPL-MCNC: 24 MG/DL — HIGH (ref 10–20)
CALCIUM SERPL-MCNC: 9.1 MG/DL — SIGNIFICANT CHANGE UP (ref 8.5–10.1)
CHLORIDE SERPL-SCNC: 97 MMOL/L — LOW (ref 98–110)
CO2 SERPL-SCNC: 25 MMOL/L — SIGNIFICANT CHANGE UP (ref 17–32)
CREAT SERPL-MCNC: 0.9 MG/DL — SIGNIFICANT CHANGE UP (ref 0.7–1.5)
GLUCOSE SERPL-MCNC: 115 MG/DL — HIGH (ref 70–99)
HCT VFR BLD CALC: 34.4 % — LOW (ref 42–52)
HGB BLD-MCNC: 11.8 G/DL — LOW (ref 14–18)
MAGNESIUM SERPL-MCNC: 2.2 MG/DL — SIGNIFICANT CHANGE UP (ref 1.8–2.4)
MCHC RBC-ENTMCNC: 32.2 PG — HIGH (ref 27–31)
MCHC RBC-ENTMCNC: 34.3 G/DL — SIGNIFICANT CHANGE UP (ref 32–37)
MCV RBC AUTO: 93.7 FL — SIGNIFICANT CHANGE UP (ref 80–94)
NRBC # BLD: 0 /100 WBCS — SIGNIFICANT CHANGE UP (ref 0–0)
PLATELET # BLD AUTO: 322 K/UL — SIGNIFICANT CHANGE UP (ref 130–400)
POTASSIUM SERPL-MCNC: 5.1 MMOL/L — HIGH (ref 3.5–5)
POTASSIUM SERPL-SCNC: 5.1 MMOL/L — HIGH (ref 3.5–5)
RBC # BLD: 3.67 M/UL — LOW (ref 4.7–6.1)
RBC # FLD: 14.7 % — HIGH (ref 11.5–14.5)
SODIUM SERPL-SCNC: 134 MMOL/L — LOW (ref 135–146)
WBC # BLD: 10.06 K/UL — SIGNIFICANT CHANGE UP (ref 4.8–10.8)
WBC # FLD AUTO: 10.06 K/UL — SIGNIFICANT CHANGE UP (ref 4.8–10.8)

## 2019-06-28 RX ADMIN — Medication 1 PATCH: at 07:27

## 2019-06-28 RX ADMIN — Medication 1 TABLET(S): at 13:02

## 2019-06-28 RX ADMIN — OXYCODONE AND ACETAMINOPHEN 2 TABLET(S): 5; 325 TABLET ORAL at 07:29

## 2019-06-28 RX ADMIN — OXYCODONE AND ACETAMINOPHEN 2 TABLET(S): 5; 325 TABLET ORAL at 07:16

## 2019-06-28 RX ADMIN — OXYCODONE AND ACETAMINOPHEN 2 TABLET(S): 5; 325 TABLET ORAL at 18:12

## 2019-06-28 RX ADMIN — POLYETHYLENE GLYCOL 3350 17 GRAM(S): 17 POWDER, FOR SOLUTION ORAL at 18:09

## 2019-06-28 RX ADMIN — Medication 1 PATCH: at 18:05

## 2019-06-28 RX ADMIN — Medication 100 MILLIGRAM(S): at 13:02

## 2019-06-28 RX ADMIN — OXYCODONE AND ACETAMINOPHEN 2 TABLET(S): 5; 325 TABLET ORAL at 14:00

## 2019-06-28 RX ADMIN — OXYCODONE AND ACETAMINOPHEN 2 TABLET(S): 5; 325 TABLET ORAL at 18:07

## 2019-06-28 RX ADMIN — LIDOCAINE 1 PATCH: 4 CREAM TOPICAL at 05:02

## 2019-06-28 RX ADMIN — HEPARIN SODIUM 5000 UNIT(S): 5000 INJECTION INTRAVENOUS; SUBCUTANEOUS at 21:28

## 2019-06-28 RX ADMIN — Medication 1 MILLIGRAM(S): at 05:42

## 2019-06-28 RX ADMIN — Medication 100 MILLIGRAM(S): at 05:41

## 2019-06-28 RX ADMIN — Medication 100 MILLIGRAM(S): at 21:28

## 2019-06-28 RX ADMIN — OXYCODONE AND ACETAMINOPHEN 2 TABLET(S): 5; 325 TABLET ORAL at 22:49

## 2019-06-28 RX ADMIN — HEPARIN SODIUM 5000 UNIT(S): 5000 INJECTION INTRAVENOUS; SUBCUTANEOUS at 05:43

## 2019-06-28 RX ADMIN — HEPARIN SODIUM 5000 UNIT(S): 5000 INJECTION INTRAVENOUS; SUBCUTANEOUS at 13:02

## 2019-06-28 RX ADMIN — SENNA PLUS 2 TABLET(S): 8.6 TABLET ORAL at 21:28

## 2019-06-28 RX ADMIN — Medication 1 PATCH: at 13:01

## 2019-06-28 RX ADMIN — SIMVASTATIN 40 MILLIGRAM(S): 20 TABLET, FILM COATED ORAL at 21:28

## 2019-06-28 RX ADMIN — METHOCARBAMOL 750 MILLIGRAM(S): 500 TABLET, FILM COATED ORAL at 13:01

## 2019-06-28 RX ADMIN — PANTOPRAZOLE SODIUM 40 MILLIGRAM(S): 20 TABLET, DELAYED RELEASE ORAL at 05:42

## 2019-06-28 RX ADMIN — METHOCARBAMOL 750 MILLIGRAM(S): 500 TABLET, FILM COATED ORAL at 05:41

## 2019-06-28 RX ADMIN — OXYCODONE AND ACETAMINOPHEN 2 TABLET(S): 5; 325 TABLET ORAL at 13:00

## 2019-06-28 RX ADMIN — METHOCARBAMOL 750 MILLIGRAM(S): 500 TABLET, FILM COATED ORAL at 21:28

## 2019-06-29 RX ADMIN — LIDOCAINE 1 PATCH: 4 CREAM TOPICAL at 06:21

## 2019-06-29 RX ADMIN — SIMVASTATIN 40 MILLIGRAM(S): 20 TABLET, FILM COATED ORAL at 21:12

## 2019-06-29 RX ADMIN — OXYCODONE AND ACETAMINOPHEN 2 TABLET(S): 5; 325 TABLET ORAL at 21:12

## 2019-06-29 RX ADMIN — METHOCARBAMOL 750 MILLIGRAM(S): 500 TABLET, FILM COATED ORAL at 06:20

## 2019-06-29 RX ADMIN — Medication 100 MILLIGRAM(S): at 06:20

## 2019-06-29 RX ADMIN — OXYCODONE AND ACETAMINOPHEN 2 TABLET(S): 5; 325 TABLET ORAL at 03:14

## 2019-06-29 RX ADMIN — Medication 1 PATCH: at 12:13

## 2019-06-29 RX ADMIN — Medication 1 PATCH: at 12:00

## 2019-06-29 RX ADMIN — OXYCODONE AND ACETAMINOPHEN 2 TABLET(S): 5; 325 TABLET ORAL at 13:06

## 2019-06-29 RX ADMIN — Medication 1 PATCH: at 19:30

## 2019-06-29 RX ADMIN — LIDOCAINE 1 PATCH: 4 CREAM TOPICAL at 17:16

## 2019-06-29 RX ADMIN — OXYCODONE AND ACETAMINOPHEN 2 TABLET(S): 5; 325 TABLET ORAL at 20:14

## 2019-06-29 RX ADMIN — OXYCODONE AND ACETAMINOPHEN 2 TABLET(S): 5; 325 TABLET ORAL at 00:10

## 2019-06-29 RX ADMIN — OXYCODONE AND ACETAMINOPHEN 2 TABLET(S): 5; 325 TABLET ORAL at 09:31

## 2019-06-29 RX ADMIN — PANTOPRAZOLE SODIUM 40 MILLIGRAM(S): 20 TABLET, DELAYED RELEASE ORAL at 06:20

## 2019-06-29 RX ADMIN — OXYCODONE AND ACETAMINOPHEN 2 TABLET(S): 5; 325 TABLET ORAL at 13:00

## 2019-06-29 RX ADMIN — METHOCARBAMOL 750 MILLIGRAM(S): 500 TABLET, FILM COATED ORAL at 13:08

## 2019-06-29 RX ADMIN — OXYCODONE AND ACETAMINOPHEN 2 TABLET(S): 5; 325 TABLET ORAL at 09:33

## 2019-06-29 RX ADMIN — Medication 1 TABLET(S): at 12:13

## 2019-06-29 RX ADMIN — METHOCARBAMOL 750 MILLIGRAM(S): 500 TABLET, FILM COATED ORAL at 21:12

## 2019-06-29 RX ADMIN — OXYCODONE AND ACETAMINOPHEN 2 TABLET(S): 5; 325 TABLET ORAL at 17:14

## 2019-06-29 RX ADMIN — LIDOCAINE 1 PATCH: 4 CREAM TOPICAL at 07:56

## 2019-06-29 RX ADMIN — LISINOPRIL 5 MILLIGRAM(S): 2.5 TABLET ORAL at 06:20

## 2019-06-30 RX ADMIN — OXYCODONE AND ACETAMINOPHEN 2 TABLET(S): 5; 325 TABLET ORAL at 17:10

## 2019-06-30 RX ADMIN — SIMVASTATIN 40 MILLIGRAM(S): 20 TABLET, FILM COATED ORAL at 21:08

## 2019-06-30 RX ADMIN — LISINOPRIL 5 MILLIGRAM(S): 2.5 TABLET ORAL at 06:17

## 2019-06-30 RX ADMIN — OXYCODONE AND ACETAMINOPHEN 2 TABLET(S): 5; 325 TABLET ORAL at 21:08

## 2019-06-30 RX ADMIN — Medication 1 PATCH: at 21:06

## 2019-06-30 RX ADMIN — OXYCODONE AND ACETAMINOPHEN 2 TABLET(S): 5; 325 TABLET ORAL at 08:03

## 2019-06-30 RX ADMIN — OXYCODONE AND ACETAMINOPHEN 2 TABLET(S): 5; 325 TABLET ORAL at 12:04

## 2019-06-30 RX ADMIN — METHOCARBAMOL 750 MILLIGRAM(S): 500 TABLET, FILM COATED ORAL at 06:17

## 2019-06-30 RX ADMIN — PANTOPRAZOLE SODIUM 40 MILLIGRAM(S): 20 TABLET, DELAYED RELEASE ORAL at 06:17

## 2019-06-30 RX ADMIN — OXYCODONE AND ACETAMINOPHEN 2 TABLET(S): 5; 325 TABLET ORAL at 03:23

## 2019-06-30 RX ADMIN — LIDOCAINE 1 PATCH: 4 CREAM TOPICAL at 06:18

## 2019-06-30 RX ADMIN — Medication 1 PATCH: at 12:05

## 2019-07-01 ENCOUNTER — TRANSCRIPTION ENCOUNTER (OUTPATIENT)
Age: 64
End: 2019-07-01

## 2019-07-01 LAB
ANION GAP SERPL CALC-SCNC: 12 MMOL/L — SIGNIFICANT CHANGE UP (ref 7–14)
BUN SERPL-MCNC: 18 MG/DL — SIGNIFICANT CHANGE UP (ref 10–20)
CALCIUM SERPL-MCNC: 8.9 MG/DL — SIGNIFICANT CHANGE UP (ref 8.5–10.1)
CHLORIDE SERPL-SCNC: 103 MMOL/L — SIGNIFICANT CHANGE UP (ref 98–110)
CO2 SERPL-SCNC: 26 MMOL/L — SIGNIFICANT CHANGE UP (ref 17–32)
CREAT SERPL-MCNC: 0.8 MG/DL — SIGNIFICANT CHANGE UP (ref 0.7–1.5)
GLUCOSE SERPL-MCNC: 95 MG/DL — SIGNIFICANT CHANGE UP (ref 70–99)
HCT VFR BLD CALC: 32.2 % — LOW (ref 42–52)
HGB BLD-MCNC: 10.9 G/DL — LOW (ref 14–18)
MCHC RBC-ENTMCNC: 32.1 PG — HIGH (ref 27–31)
MCHC RBC-ENTMCNC: 33.9 G/DL — SIGNIFICANT CHANGE UP (ref 32–37)
MCV RBC AUTO: 94.7 FL — HIGH (ref 80–94)
NRBC # BLD: 0 /100 WBCS — SIGNIFICANT CHANGE UP (ref 0–0)
PLATELET # BLD AUTO: 244 K/UL — SIGNIFICANT CHANGE UP (ref 130–400)
POTASSIUM SERPL-MCNC: 4.7 MMOL/L — SIGNIFICANT CHANGE UP (ref 3.5–5)
POTASSIUM SERPL-SCNC: 4.7 MMOL/L — SIGNIFICANT CHANGE UP (ref 3.5–5)
RBC # BLD: 3.4 M/UL — LOW (ref 4.7–6.1)
RBC # FLD: 14.9 % — HIGH (ref 11.5–14.5)
SODIUM SERPL-SCNC: 141 MMOL/L — SIGNIFICANT CHANGE UP (ref 135–146)
WBC # BLD: 6.89 K/UL — SIGNIFICANT CHANGE UP (ref 4.8–10.8)
WBC # FLD AUTO: 6.89 K/UL — SIGNIFICANT CHANGE UP (ref 4.8–10.8)

## 2019-07-01 RX ORDER — LISINOPRIL 2.5 MG/1
1 TABLET ORAL
Qty: 0 | Refills: 0 | DISCHARGE
Start: 2019-07-01

## 2019-07-01 RX ORDER — SIMVASTATIN 20 MG/1
1 TABLET, FILM COATED ORAL
Qty: 0 | Refills: 0 | DISCHARGE
Start: 2019-07-01

## 2019-07-01 RX ORDER — OXYCODONE AND ACETAMINOPHEN 5; 325 MG/1; MG/1
2 TABLET ORAL EVERY 4 HOURS
Refills: 0 | Status: DISCONTINUED | OUTPATIENT
Start: 2019-07-01 | End: 2019-07-03

## 2019-07-01 RX ORDER — METHOCARBAMOL 500 MG/1
1 TABLET, FILM COATED ORAL
Qty: 0 | Refills: 0 | DISCHARGE
Start: 2019-07-01

## 2019-07-01 RX ORDER — ENOXAPARIN SODIUM 100 MG/ML
40 INJECTION SUBCUTANEOUS EVERY 24 HOURS
Refills: 0 | Status: DISCONTINUED | OUTPATIENT
Start: 2019-07-01 | End: 2019-07-03

## 2019-07-01 RX ORDER — NICOTINE POLACRILEX 2 MG
1 GUM BUCCAL
Qty: 0 | Refills: 0 | DISCHARGE
Start: 2019-07-01

## 2019-07-01 RX ADMIN — LISINOPRIL 5 MILLIGRAM(S): 2.5 TABLET ORAL at 06:03

## 2019-07-01 RX ADMIN — Medication 1 PATCH: at 07:22

## 2019-07-01 RX ADMIN — LIDOCAINE 1 PATCH: 4 CREAM TOPICAL at 20:46

## 2019-07-01 RX ADMIN — LIDOCAINE 1 PATCH: 4 CREAM TOPICAL at 07:20

## 2019-07-01 RX ADMIN — PANTOPRAZOLE SODIUM 40 MILLIGRAM(S): 20 TABLET, DELAYED RELEASE ORAL at 06:03

## 2019-07-01 RX ADMIN — ENOXAPARIN SODIUM 40 MILLIGRAM(S): 100 INJECTION SUBCUTANEOUS at 20:50

## 2019-07-01 RX ADMIN — OXYCODONE AND ACETAMINOPHEN 2 TABLET(S): 5; 325 TABLET ORAL at 17:15

## 2019-07-01 RX ADMIN — Medication 1 TABLET(S): at 12:16

## 2019-07-01 RX ADMIN — OXYCODONE AND ACETAMINOPHEN 2 TABLET(S): 5; 325 TABLET ORAL at 09:48

## 2019-07-01 RX ADMIN — OXYCODONE AND ACETAMINOPHEN 2 TABLET(S): 5; 325 TABLET ORAL at 01:26

## 2019-07-01 RX ADMIN — OXYCODONE AND ACETAMINOPHEN 2 TABLET(S): 5; 325 TABLET ORAL at 13:06

## 2019-07-01 RX ADMIN — Medication 1 PATCH: at 20:51

## 2019-07-01 RX ADMIN — OXYCODONE AND ACETAMINOPHEN 2 TABLET(S): 5; 325 TABLET ORAL at 20:45

## 2019-07-01 RX ADMIN — OXYCODONE AND ACETAMINOPHEN 2 TABLET(S): 5; 325 TABLET ORAL at 07:41

## 2019-07-01 RX ADMIN — Medication 1 PATCH: at 13:05

## 2019-07-01 RX ADMIN — Medication 1 PATCH: at 12:13

## 2019-07-01 RX ADMIN — LIDOCAINE 1 PATCH: 4 CREAM TOPICAL at 06:04

## 2019-07-01 RX ADMIN — OXYCODONE AND ACETAMINOPHEN 2 TABLET(S): 5; 325 TABLET ORAL at 12:44

## 2019-07-01 RX ADMIN — SIMVASTATIN 40 MILLIGRAM(S): 20 TABLET, FILM COATED ORAL at 21:19

## 2019-07-01 NOTE — DISCHARGE NOTE PROVIDER - NSDCFUADDAPPT_GEN_ALL_CORE_FT
you were tested negative for C diff, test was done due to frequent BMs . if bms continue to be bothersome please go to a GI doctor

## 2019-07-01 NOTE — DISCHARGE NOTE PROVIDER - HOSPITAL COURSE
63 year old  man with past medical history of  Hypertension  and active smoking only,  presents with  left upper extremity weakness as well as weakness in the left hand . He went to the OR and underwent C3-7 decompressive laminectomy, foraminotomy with lateral mass fixation on 6/14  by dr Chip Khoury . He had an increase in  strength and increase in left arm strength since post op and with physical therapy . He was stransferred to rehab on         6/18/19 ,, his staples from neck  removed by neurosurgery and he continued to improve his strength ., pain is controlled .  he will be discharge d home on 7/3/19 with instructions to follow up his neurosurgeon and pmd in 1 to 2 weeks . he is able to ambulate with rolling walker and contact guard 150 feet 63 year old  man with past medical history of  Hypertension  and active smoking only,  presents with  left upper extremity weakness as well as weakness in the left hand . He went to the OR and underwent C3-7 decompressive laminectomy, foraminotomy with lateral mass fixation on 6/14  by dr Chip Khoury . He had an increase in  strength and increase in left arm strength since post op and with physical therapy . He was stransferred to rehab on         6/18/19 ,, his staples from neck  removed by neurosurgery and he continued to improve his strength ., pain is controlled.  He  was  tested negative for c diff ,   C diff  was done due to having frequent bowel movements, but not diarrhea., he is instructed to follow up Gi doctor if  frequecy  of bms persists .he will be discharge d home on 7/3/19 with instructions to follow up his neurosurgeon and pmd in 1 to 2 weeks . he is able to ambulate with rolling walker and contact guard 150 feet

## 2019-07-01 NOTE — DISCHARGE NOTE PROVIDER - NSDCCPCAREPLAN_GEN_ALL_CORE_FT
PRINCIPAL DISCHARGE DIAGNOSIS  Diagnosis: Stenosis of cervical spine with myelopathy  Assessment and Plan of Treatment: s/p c3 to 7 decompressive laminectomy  ,  continue home physical therapy , report any worsening of pain ,numbness , incision swelling , drainage or fever      SECONDARY DISCHARGE DIAGNOSES  Diagnosis: Hypertension  Assessment and Plan of Treatment: continue meds and diet control ,

## 2019-07-02 RX ADMIN — OXYCODONE AND ACETAMINOPHEN 2 TABLET(S): 5; 325 TABLET ORAL at 17:51

## 2019-07-02 RX ADMIN — Medication 1 PATCH: at 19:19

## 2019-07-02 RX ADMIN — OXYCODONE AND ACETAMINOPHEN 2 TABLET(S): 5; 325 TABLET ORAL at 23:27

## 2019-07-02 RX ADMIN — ENOXAPARIN SODIUM 40 MILLIGRAM(S): 100 INJECTION SUBCUTANEOUS at 19:21

## 2019-07-02 RX ADMIN — OXYCODONE AND ACETAMINOPHEN 2 TABLET(S): 5; 325 TABLET ORAL at 15:15

## 2019-07-02 RX ADMIN — Medication 1 PATCH: at 11:57

## 2019-07-02 RX ADMIN — LIDOCAINE 1 PATCH: 4 CREAM TOPICAL at 06:40

## 2019-07-02 RX ADMIN — Medication 1 PATCH: at 15:14

## 2019-07-02 RX ADMIN — LISINOPRIL 5 MILLIGRAM(S): 2.5 TABLET ORAL at 06:41

## 2019-07-02 RX ADMIN — SIMVASTATIN 40 MILLIGRAM(S): 20 TABLET, FILM COATED ORAL at 21:37

## 2019-07-02 RX ADMIN — LIDOCAINE 1 PATCH: 4 CREAM TOPICAL at 08:00

## 2019-07-02 RX ADMIN — LIDOCAINE 1 PATCH: 4 CREAM TOPICAL at 18:26

## 2019-07-02 RX ADMIN — LIDOCAINE 1 PATCH: 4 CREAM TOPICAL at 11:24

## 2019-07-02 RX ADMIN — Medication 1 TABLET(S): at 11:56

## 2019-07-02 RX ADMIN — OXYCODONE AND ACETAMINOPHEN 2 TABLET(S): 5; 325 TABLET ORAL at 09:00

## 2019-07-02 RX ADMIN — PANTOPRAZOLE SODIUM 40 MILLIGRAM(S): 20 TABLET, DELAYED RELEASE ORAL at 06:41

## 2019-07-02 RX ADMIN — LIDOCAINE 1 PATCH: 4 CREAM TOPICAL at 07:54

## 2019-07-02 RX ADMIN — OXYCODONE AND ACETAMINOPHEN 2 TABLET(S): 5; 325 TABLET ORAL at 12:48

## 2019-07-02 RX ADMIN — OXYCODONE AND ACETAMINOPHEN 2 TABLET(S): 5; 325 TABLET ORAL at 20:15

## 2019-07-02 RX ADMIN — Medication 1 PATCH: at 07:59

## 2019-07-02 RX ADMIN — OXYCODONE AND ACETAMINOPHEN 2 TABLET(S): 5; 325 TABLET ORAL at 07:56

## 2019-07-03 ENCOUNTER — TRANSCRIPTION ENCOUNTER (OUTPATIENT)
Age: 64
End: 2019-07-03

## 2019-07-03 DIAGNOSIS — R19.4 CHANGE IN BOWEL HABIT: ICD-10-CM

## 2019-07-03 LAB
C DIFF BY PCR RESULT: NEGATIVE — SIGNIFICANT CHANGE UP
C DIFF TOX GENS STL QL NAA+PROBE: SIGNIFICANT CHANGE UP

## 2019-07-03 RX ORDER — NICOTINE POLACRILEX 2 MG
1 GUM BUCCAL
Qty: 30 | Refills: 0
Start: 2019-07-03 | End: 2019-08-01

## 2019-07-03 RX ORDER — SIMVASTATIN 20 MG/1
1 TABLET, FILM COATED ORAL
Qty: 30 | Refills: 0
Start: 2019-07-03 | End: 2019-08-01

## 2019-07-03 RX ORDER — METHOCARBAMOL 500 MG/1
1 TABLET, FILM COATED ORAL
Qty: 30 | Refills: 0
Start: 2019-07-03 | End: 2019-07-12

## 2019-07-03 RX ORDER — LISINOPRIL 2.5 MG/1
1 TABLET ORAL
Qty: 30 | Refills: 0
Start: 2019-07-03 | End: 2019-08-01

## 2019-07-03 RX ORDER — SIMVASTATIN 20 MG/1
40 TABLET, FILM COATED ORAL AT BEDTIME
Refills: 0 | Status: DISCONTINUED | OUTPATIENT
Start: 2019-07-03 | End: 2019-07-03

## 2019-07-03 RX ADMIN — OXYCODONE AND ACETAMINOPHEN 2 TABLET(S): 5; 325 TABLET ORAL at 13:52

## 2019-07-03 RX ADMIN — OXYCODONE AND ACETAMINOPHEN 2 TABLET(S): 5; 325 TABLET ORAL at 08:59

## 2019-07-03 RX ADMIN — LIDOCAINE 1 PATCH: 4 CREAM TOPICAL at 17:01

## 2019-07-03 RX ADMIN — Medication 1 PATCH: at 12:08

## 2019-07-03 RX ADMIN — METHOCARBAMOL 750 MILLIGRAM(S): 500 TABLET, FILM COATED ORAL at 05:54

## 2019-07-03 RX ADMIN — Medication 1 PATCH: at 06:25

## 2019-07-03 RX ADMIN — OXYCODONE AND ACETAMINOPHEN 2 TABLET(S): 5; 325 TABLET ORAL at 15:46

## 2019-07-03 RX ADMIN — OXYCODONE AND ACETAMINOPHEN 2 TABLET(S): 5; 325 TABLET ORAL at 07:33

## 2019-07-03 RX ADMIN — LIDOCAINE 1 PATCH: 4 CREAM TOPICAL at 06:26

## 2019-07-03 RX ADMIN — OXYCODONE AND ACETAMINOPHEN 2 TABLET(S): 5; 325 TABLET ORAL at 11:45

## 2019-07-03 RX ADMIN — Medication 1 PATCH: at 11:46

## 2019-07-03 RX ADMIN — Medication 1 TABLET(S): at 11:45

## 2019-07-03 RX ADMIN — PANTOPRAZOLE SODIUM 40 MILLIGRAM(S): 20 TABLET, DELAYED RELEASE ORAL at 06:25

## 2019-07-03 RX ADMIN — LIDOCAINE 1 PATCH: 4 CREAM TOPICAL at 08:58

## 2019-07-03 RX ADMIN — LISINOPRIL 5 MILLIGRAM(S): 2.5 TABLET ORAL at 05:54

## 2019-07-03 RX ADMIN — OXYCODONE AND ACETAMINOPHEN 2 TABLET(S): 5; 325 TABLET ORAL at 17:01

## 2019-07-03 NOTE — DISCHARGE NOTE NURSING/CASE MANAGEMENT/SOCIAL WORK - NSDCDPATPORTLINK_GEN_ALL_CORE
You can access the [facility] Patient Portal, offered by [facility], by registering with the following website: [email or web address]

## 2019-07-11 DIAGNOSIS — Z47.89 ENCOUNTER FOR OTHER ORTHOPEDIC AFTERCARE: ICD-10-CM

## 2019-07-11 DIAGNOSIS — I10 ESSENTIAL (PRIMARY) HYPERTENSION: ICD-10-CM

## 2019-07-11 DIAGNOSIS — K21.9 GASTRO-ESOPHAGEAL REFLUX DISEASE WITHOUT ESOPHAGITIS: ICD-10-CM

## 2019-07-11 DIAGNOSIS — R53.81 OTHER MALAISE: ICD-10-CM

## 2019-07-11 DIAGNOSIS — E78.5 HYPERLIPIDEMIA, UNSPECIFIED: ICD-10-CM

## 2019-07-11 DIAGNOSIS — F03.90 UNSPECIFIED DEMENTIA WITHOUT BEHAVIORAL DISTURBANCE: ICD-10-CM

## 2019-07-11 DIAGNOSIS — F17.210 NICOTINE DEPENDENCE, CIGARETTES, UNCOMPLICATED: ICD-10-CM

## 2019-07-11 DIAGNOSIS — K59.00 CONSTIPATION, UNSPECIFIED: ICD-10-CM

## 2019-07-11 DIAGNOSIS — Z51.89 ENCOUNTER FOR OTHER SPECIFIED AFTERCARE: ICD-10-CM

## 2019-07-11 PROBLEM — Z00.00 ENCOUNTER FOR PREVENTIVE HEALTH EXAMINATION: Status: ACTIVE | Noted: 2019-07-11

## 2019-07-18 ENCOUNTER — APPOINTMENT (OUTPATIENT)
Dept: NEUROSURGERY | Facility: CLINIC | Age: 64
End: 2019-07-18
Payer: COMMERCIAL

## 2019-07-18 VITALS — WEIGHT: 175 LBS | BODY MASS INDEX: 25.05 KG/M2 | HEIGHT: 70 IN

## 2019-07-18 DIAGNOSIS — I10 ESSENTIAL (PRIMARY) HYPERTENSION: ICD-10-CM

## 2019-07-18 PROCEDURE — 99024 POSTOP FOLLOW-UP VISIT: CPT

## 2019-07-18 RX ORDER — HYDROCHLOROTHIAZIDE 12.5 MG/1
TABLET ORAL
Refills: 0 | Status: ACTIVE | COMMUNITY

## 2019-07-18 NOTE — REASON FOR VISIT
[de-identified] : C3-7 decompressive laminectomy, foraminotomy, with lateral mass fixation.  [de-identified] : 6/14/19

## 2019-07-18 NOTE — ASSESSMENT
[FreeTextEntry1] : We have had a thorough discussion regarding his current condition, findings, and treatment options. He is doing well postoperatively. He is displaying some signs of improvement in a short period of time. He will give himself ample time to heal. I have recommended extensive physical therapy at this time. Unfortunately he was denied OT due to insurance reasons. He will start Gabapentin 300 mg titrating up to TID dosing for his residual myofasical pain and neuropathic symptoms. He will also start Chlorzoxazone as needed for spams. I have given him a one week supply of Percocet to take sparingly. If future prescriptions are needed he will consult with pain management. I will see him back in 6 weeks with xrays of the cervical spine. He will start PT as scheduled. He will call barring any issues.

## 2019-07-18 NOTE — HISTORY OF PRESENT ILLNESS
[FreeTextEntry1] : Mr. Stevens presented to the ER on 6/10/19 with progressive weakness in the upper extremities worse on the left side, including the hand, along with weakness in the legs, especially the left hip flexor. He developed tingling in the right hand and numbness from the left hand radiating up the left arm. He states that weeks prior to admission he developed symptoms including pain in his neck and shoulder with weakness in the right shoulder and the inability to lift the right shoulder actively above his head.  \par \par Upon workup he was found to have diffuse cervical spondylosis with severe stenosis and spinal cord compression. Cord signal changes were noted on the MRI as well. \par \par Since surgery he notes some improvements. He feel's his extremity strength has improvement. He notes mild residual left hip flexor weakness however his overall gait is much more stable. He is ambulating with a single prong cane now. At times he will use a walker at home but this is mainly when he wakes up in the middle of the night or in the mornings. He continues to have some intermittent tingling in the left hand and mild numbness in the fingertips of the right hand. There is atrophy notes in the left arm and hand. There is also left intrinsic hand weakness. He has some incisional neck pain and spasms, however, the incision is healing well with no signs of infection. He has improved strength in the arms with continued right shoulder abduction weakness with active movement. I am able to passively move the right shoulder with full ROM. He is taking Percocet as needed sparingly. He is scheduled to start outpatient PT.

## 2019-08-19 ENCOUNTER — OUTPATIENT (OUTPATIENT)
Dept: OUTPATIENT SERVICES | Facility: HOSPITAL | Age: 64
LOS: 1 days | Discharge: HOME | End: 2019-08-19
Payer: COMMERCIAL

## 2019-08-19 DIAGNOSIS — G95.9 DISEASE OF SPINAL CORD, UNSPECIFIED: ICD-10-CM

## 2019-08-19 PROCEDURE — 72040 X-RAY EXAM NECK SPINE 2-3 VW: CPT | Mod: 26

## 2019-08-29 ENCOUNTER — APPOINTMENT (OUTPATIENT)
Dept: NEUROSURGERY | Facility: CLINIC | Age: 64
End: 2019-08-29
Payer: COMMERCIAL

## 2019-08-29 VITALS — WEIGHT: 180 LBS | HEIGHT: 70 IN | BODY MASS INDEX: 25.77 KG/M2

## 2019-08-29 PROCEDURE — 99024 POSTOP FOLLOW-UP VISIT: CPT

## 2019-08-29 NOTE — REASON FOR VISIT
[de-identified] : C3-7 decompressive laminectomy, foraminotomy, with lateral mass fixation. [de-identified] : 6/14/19

## 2019-08-29 NOTE — DATA REVIEWED
[de-identified] : \par - Xrays of the cervical spine were reviewed today together with the patient. Hardware intact.

## 2019-08-29 NOTE — ASSESSMENT
[FreeTextEntry1] : Ms. Stevens will continue PT. He will continue with medical management with Dr. Meléndez. He will remain on Gabapentin 300 TID and he will be reassessed in 3 weeks. I will see him back at that time.

## 2019-08-29 NOTE — HISTORY OF PRESENT ILLNESS
[FreeTextEntry1] : Mr. Stevens is well. His incision site has healed well. He ambulates with a cane. He has been doing PT for strengthening and gait training. He continues to have some numbness in the left hand and burning in the trapezii. He is taking Vicodin as needed and Gabapentin 300 mg TID. Dr. Meléndez is managing the Vicodin.

## 2019-09-19 ENCOUNTER — APPOINTMENT (OUTPATIENT)
Dept: NEUROSURGERY | Facility: CLINIC | Age: 64
End: 2019-09-19
Payer: COMMERCIAL

## 2019-09-19 VITALS — WEIGHT: 186 LBS | BODY MASS INDEX: 26.63 KG/M2 | HEIGHT: 70 IN

## 2019-09-19 PROCEDURE — 99214 OFFICE O/P EST MOD 30 MIN: CPT

## 2019-09-19 RX ORDER — LOSARTAN POTASSIUM 100 MG/1
TABLET, FILM COATED ORAL
Refills: 0 | Status: ACTIVE | COMMUNITY

## 2019-09-19 RX ORDER — HYDROCODONE BITARTRATE AND ACETAMINOPHEN 10; 325 MG/1; MG/1
TABLET ORAL
Refills: 0 | Status: ACTIVE | COMMUNITY

## 2019-09-19 RX ORDER — OXYCODONE AND ACETAMINOPHEN 5; 325 MG/1; MG/1
5-325 TABLET ORAL EVERY 8 HOURS
Qty: 21 | Refills: 0 | Status: DISCONTINUED | COMMUNITY
Start: 2019-07-18 | End: 2019-09-19

## 2019-09-19 NOTE — ASSESSMENT
[FreeTextEntry1] : We had a thorough discussion regarding his current condition, findings, and treatment options. He will continue with PT for his cervical spine and residual myelopathy. He will add PT for lumbar strain and left hip bursitis. He wishes to hold off on xrays of the lumbar spine and left hip for now. If it does not improve he will notify me and xrays will be ordered. I have prescribed him Naproxen to take as needed. He will remain on Gabapentin and Vicodn as prescribed by Dr. Meléndez. I will see him back in 6 weeks.

## 2019-09-19 NOTE — HISTORY OF PRESENT ILLNESS
[FreeTextEntry1] : Mr. Stevens is status post C3-7 decompressive laminectomy, foraminotomy, with lateral mass fixation. Surgery took place on 6/14/19. His incision site has healed well. He ambulates with a cane. He has been doing PT for strengthening and gait training. He feel's since surgery that his strength and pain has improved however he continues to have numbness in the left hand, with the feeling of swelling in the hand and finges. He no longer feels burning in the trapezii or the hands. The right hand has improved completely. He is taking Vicodin as needed and Gabapentin 300 mg TID. Dr. Meléndez is managing the Vicodin. He takes this sparingly. He takes the Gabapentin as directed compliantly which has provided some relief. As for his walking he feels steadier and more balanced. He is also able to lift his arms above his head. He is happy with his progress. \par \par Unfortunately, 3 days ago after doing a PT exercise which abducted the left hip he developed pain around the left hip and buttock into the groin and thigh. It has lessened since onset however it is still causing him discomfort. He denies axial lower back pain today. On exam he has pain with external rotation of the left hip .

## 2019-09-26 ENCOUNTER — RX RENEWAL (OUTPATIENT)
Age: 64
End: 2019-09-26

## 2019-09-26 NOTE — PRE-OP CHECKLIST - SELECT TESTS ORDERED
Patient:   CJ GANDHI            MRN: [id number]            FIN: [id number]               Age:   55 years     Sex:  FEMALE     :  67   Associated Diagnoses:   None   Author:   MANDA BOWER      Basic Information   History source: Patient, RN.      History of Present Illness   [facility] Hospitalist - Discharge Summary    DATE OF ADMISSION: 2018  DATE OF DISCHARGE: 2018     PCP: Dr. Ivette Peterson  DATE PCP NOTIFIED: 2018  METHOD OF NOTIFICATION: [employer]    CONSULTING PHYSICIAN(s):  Heme/Onc - Dr. Enamorado  Pulm/CC - Dr. Case  Surgery - Dr. Shah  ID - Dr. Luong    DISCHARGE DIAGNOSES:   Severe Sepsis with shock (POA) 2/2 abdominal wall abscess  Metastatic cervical cancer (diagnosed ) status post chemotherapy  Electrolyte abnormalities (hyponatremia, hypokalemia, hypomagnesemia, and hypochloremia), likely 2/2 poor PO intake   Severe protein calorie malnutrition  Elevated alk phos and bilirubin  History of PE  Chronic kidney disease stage 3  Chronic anemia d/t chronic disease  GERD  Seizure Disorder  Depression    PROCEDURES PERFORMED DURING ADMISSION AND DATES PERFORMED: None    TEST RESULTS PENDING AT DISCHARGE AND PROBLEMS NEEDING F/U: None      HOSPITAL COURSE:     This is a 55 year old female, a patient of Ivette Woodson, with a past medical history of Metastatic Cervical Cancer s/p ELEANOR-BSO and debulking, History of Pulmonary Embolism, Depression, GERD, who presented with a chief complaint of fever. Upon arrival the patient was febrile, , RR 18, SpO2 96%, and BP 98/64. Labs were remarkable for sodium 131, potassium 1.8, chloride 91, magnessium 0.3, alk phos 509, bilirubin 1.7, protein 5.6, albumin 2.5, lipase 59, Hgb 9.9. A CXR showed stable normal heart size and mediastinal contours. No dense focal airspace consolidation, pleural effusion, or detectable pneumothorax. No acute osseous abnormalities. A CT A/P showed interval removal of previously seen  CMP/Urinalysis/Type and Screen/CXR/EKG/BMP/CBC/INR/PT/PTT percutaneous gastrostomy tube. Slight interval decrease in size of now 6 x 3.9 cm (previously 6.8 x 4 cm) rim-enhancing fluid collection abutting the lateral aspect of the gastric fundus/greater curvature of the stomach. Stable appearance of subcapsular hepatic and splenic lesions. The pt was admitted under inpatient status in the ICU for further care and treatment.     After being admitted, this patient was found to be septic from an abdominal wall abscess. Her blood cultures grew gram negative bacillli and citrobacter freundii, so she was provided with antibiotics and IV fluids and consultations were sent out to surgery, ID, and pulmonary/critical care. Additionally, her Smr culture grew rare mixed khanh consisting of 3 types of gram positive organsims as well as very rare gram positive cocci. IR was also consulted as well and replaced her G-tube as her feeding tube had been accidentally removed as an outpatient.   Abx changed to IV Ceftriaxone at d/c to be continued until 5/17. Repeat Ct to be done as outptin 2-3 weeks-to be f/u with sx.     Additionally, she had multiple electrolyte abnormalities while she was an inpatient, due to poor oral intake. These were repleted with supplementation.     She was also found to have severe protein calorie malnutrition. Nutrition was consulted and helped provide appropriate feeding regimens for her to improve her condition.    Cholecystostomy tube to be removed per Dr Pizarro ([place]).    Dr Estrada will f/u as outpt about chemo for mets cervical ca.     The patient has verbalized understanding and agreement of her discharge planning and all questions have been answered.     d/w was coordinated with .  d/w DR Estrada.    CONDITION ON DISCHARGE: Stable      DISCHARGE INSTRUCTIONS:  DISCHARGE TO: Home  DIET: General  ACTIVITY: As tolerated   HOME HEALTH CARE RN/PT/OT/MSW/Wound/Ostomy Agency: [facility]  HOME OXYGEN: NONE    FOLLOW-UP APPOINTMENTS:   Follow up with  PCP in 3-5 days and surgery in 2 weeks   hem and ID per reccs      NEW MEDICATIONS / MEDICATIONS DISCONTINUED / DOSAGE CHANGES DURING THIS ADMISSION: Ceftriaxone    TOTAL TIME SPENT: > 30 minutes    Charting performed by david Perez for Dr. Nell Lam.        Physical Examination   General:  Alert, no acute distress.    Ears, nose, mouth and throat:  Oral mucosa moist.   Cardiovascular:  Regular rate and rhythm, No edema.    Respiratory:  Lungs are clear to auscultation, respirations are non-labored, breath sounds are equal, Symmetrical chest wall expansion.    Gastrointestinal:  Soft, Nontender, Non distended, Normal bowel sounds, +G tube, +cholecystostomy tube rt lateral quadrant.    Neurological:  Alert and oriented to person, place, time, and situation, normal motor observed, normal speech observed.    Eye  Normal conjunctiva.      Medical Decision Making   Results review:   Rationale:  All medical record entries made by the scribe were at my direction. I have reviewed the chart and agree that the record accurately reflects my personal performance of the history, physical exam, hospital course, and assessment and plan. .            Electronically Signed On 05/09/2018 15:12  __________________________________________________   NELL BOWER

## 2019-10-17 ENCOUNTER — APPOINTMENT (OUTPATIENT)
Dept: NEUROSURGERY | Facility: CLINIC | Age: 64
End: 2019-10-17
Payer: COMMERCIAL

## 2019-10-17 VITALS — WEIGHT: 193 LBS | HEIGHT: 70 IN | BODY MASS INDEX: 27.63 KG/M2

## 2019-10-17 PROCEDURE — 99214 OFFICE O/P EST MOD 30 MIN: CPT

## 2019-10-17 NOTE — HISTORY OF PRESENT ILLNESS
[FreeTextEntry1] : Mr. Stevens is status post C3-7 decompressive laminectomy, foraminotomy, with lateral mass fixation. Surgery took place on 6/14/19. \par \par His incision site has healed well. He ambulates with a cane. He has been doing PT for strengthening and gait training. He feel's since surgery that his strength and pain has improved however he continues to have numbness in the left hand, with the feeling of swelling in the hand and fingers. He no longer feels burning in the trapezii or the hands. The right hand has improved completely. He notes that since started Gabapentin 300 mg TID he is feeling better overall. He is also taking Chlorzoxazone as needed for spasms. When compared to last visit he is feeling better overall. The mobility in his upper extremities is better. He is ambulating with a cane. His gait is steadier when compared to preoperatively. He notes improvement of his hip pain also since last visit.

## 2019-10-17 NOTE — ASSESSMENT
[FreeTextEntry1] : Mr. Stevens is doing relatively well. He will continue with medical management and PT. I will see him back in 3 months for reassessment.

## 2019-10-23 ENCOUNTER — RX CHANGE (OUTPATIENT)
Age: 64
End: 2019-10-23

## 2019-10-23 ENCOUNTER — RX RENEWAL (OUTPATIENT)
Age: 64
End: 2019-10-23

## 2019-10-23 ENCOUNTER — OUTPATIENT (OUTPATIENT)
Dept: OUTPATIENT SERVICES | Facility: HOSPITAL | Age: 64
LOS: 1 days | Discharge: HOME | End: 2019-10-23

## 2019-10-23 DIAGNOSIS — M48.01 SPINAL STENOSIS, OCCIPITO-ATLANTO-AXIAL REGION: ICD-10-CM

## 2019-10-23 RX ORDER — CHLORZOXAZONE 500 MG/1
500 TABLET ORAL 3 TIMES DAILY
Qty: 90 | Refills: 2 | Status: DISCONTINUED | COMMUNITY
Start: 2019-07-18 | End: 2019-10-23

## 2019-11-25 ENCOUNTER — RX RENEWAL (OUTPATIENT)
Age: 64
End: 2019-11-25

## 2019-11-25 ENCOUNTER — OTHER (OUTPATIENT)
Age: 64
End: 2019-11-25

## 2020-01-09 ENCOUNTER — APPOINTMENT (OUTPATIENT)
Dept: NEUROSURGERY | Facility: CLINIC | Age: 65
End: 2020-01-09
Payer: OTHER MISCELLANEOUS

## 2020-01-09 VITALS — HEIGHT: 70 IN | BODY MASS INDEX: 27.63 KG/M2 | WEIGHT: 193 LBS

## 2020-01-09 PROCEDURE — 99214 OFFICE O/P EST MOD 30 MIN: CPT

## 2020-01-09 NOTE — ASSESSMENT
[FreeTextEntry1] : Mr. Stevens is doing relatively well. I have recommended that he restart physical therapy for his myofascial neck pain and continued extremity symptoms and gait issues. Authorization will be requested. l see him back in 2 months for reassessment.

## 2020-01-09 NOTE — HISTORY OF PRESENT ILLNESS
[FreeTextEntry1] : Mr. Stevens presented to the ER on 6/10/19 with progressive weakness in the upper extremities worse on the left side, including the hand, along with weakness in the legs, especially the left hip flexor. He developed tingling in the right hand and numbness from the left hand radiating up the left arm. On this date, 6/10/19, he was carrying floor tiles at work and immediately after developed progressive symptoms as stated above. \par \par Upon workup he was found to have diffuse cervical spondylosis with severe stenosis and spinal cord compression. Cord signal changes were noted on the MRI as well. \par \par Mr. Stevens is now status post C3-7 decompressive laminectomy, foraminotomy, with lateral mass fixation. Surgery took place on 6/14/19. He has a well healed incision. He is able to walk without assistance. He continues to have some gait instability however there has been some improvement since surgery. Since surgery that his strength and pain has improved however he continues to have numbness in the left hand, with the feeling of swelling in the hand and fingers. He no longer feels burning in the trapezii or the hands. The right hand has improved completely. He is taking Gabapentin 300 mg TID. He was able to do some PT however this was put on hold recently. With PT he was having less myofascial neck pain and he felt the mobility in his upper extremities was better. He feel the dexterity in his hands has improved since he is now able to button his shirts and pants. \par

## 2020-01-17 NOTE — DISCHARGE NOTE NURSING/CASE MANAGEMENT/SOCIAL WORK - NSDCVIVACCINE_GEN_ALL_CORE_FT
No Vaccines Administered.
Eat healthy foods you enjoy. Apixaban/Eliquis DOES NOT have a special diet. Limit your alcohol intake.

## 2020-03-19 ENCOUNTER — APPOINTMENT (OUTPATIENT)
Dept: NEUROSURGERY | Facility: CLINIC | Age: 65
End: 2020-03-19
Payer: OTHER MISCELLANEOUS

## 2020-03-19 VITALS — BODY MASS INDEX: 27.63 KG/M2 | HEIGHT: 70 IN | WEIGHT: 193 LBS

## 2020-03-19 PROCEDURE — 99214 OFFICE O/P EST MOD 30 MIN: CPT

## 2020-03-19 NOTE — HISTORY OF PRESENT ILLNESS
[FreeTextEntry1] : Mr. Stevens presented to the ER on 6/10/19 with progressive weakness in the upper extremities worse on the left side, including the hand, along with weakness in the legs, especially the left hip flexor. He developed tingling in the right hand and numbness from the left hand radiating up the left arm. On this date, 6/10/19, he was carrying floor tiles at work and immediately after developed progressive symptoms as stated above. \par \par Upon workup he was found to have diffuse cervical spondylosis with severe stenosis and spinal cord compression. Cord signal changes were noted on the MRI as well. \par \par Mr. Stevens is now status post C3-7 decompressive laminectomy, foraminotomy, with lateral mass fixation. Surgery took place on 6/14/19. \par \par He has a well healed incision. He is able to walk without assistance. He continues to have residual gait instability however there has been improvement since surgery. Since surgery that his strength and pain has improved however he continues to have numbness in the left hand, with the feeling of swelling in the hand and fingers. He no longer feels burning in the trapezii or the hands. The right hand has improved completely. He is taking Gabapentin 300 mg TID. He has completed PT. He feels the dexterity in his hands has improved since he is now able to button his shirts and pants. He is having some isolated wrist pain however he will consult with an orthopedic today regarding this. \par

## 2020-03-19 NOTE — ASSESSMENT
[FreeTextEntry1] : Mr. Stevens is doing relatively well. He does not feel PT is needed at this time. He will continue home exercise. Due to the residual numbness in his hands we may order an EMG next visit. l see him back in 2 months for reassessment. I have renewed Gabapentin for him today.

## 2020-06-11 ENCOUNTER — APPOINTMENT (OUTPATIENT)
Dept: NEUROSURGERY | Facility: CLINIC | Age: 65
End: 2020-06-11
Payer: OTHER MISCELLANEOUS

## 2020-06-11 VITALS — HEIGHT: 70 IN | BODY MASS INDEX: 27.49 KG/M2 | WEIGHT: 192 LBS

## 2020-06-11 PROCEDURE — 99214 OFFICE O/P EST MOD 30 MIN: CPT

## 2020-06-11 NOTE — PHYSICAL EXAM
[FreeTextEntry1] : Alert / Oriented\par No distress\par Gait slow and cautious \par Well healed incision \par Motor exam intact, mild residual left hand  and deltoid weakness. \par Decreased sensation in the hands \par Swelling of the wrists, + cyst right wrist. \par Reflexes brisk\par + Residual Hoffmans

## 2020-06-11 NOTE — ASSESSMENT
[FreeTextEntry1] : Mr. Stevens is doing relatively well. He continues to have residual myelopathy. He will continue home exercise. He will consult with ortho for his wrist pain and possible ganglion cyst.  l see him back in 2 months for reassessment. I have renewed Gabapentin for him today. \par \par \par Disability Status: Totally and permanently disabled. Unable to work at any capacity.

## 2020-06-11 NOTE — HISTORY OF PRESENT ILLNESS
[FreeTextEntry1] : Mr. Stevens presented to the ER on 6/10/19 with progressive weakness in the upper extremities worse on the left side, including the hand, along with weakness in the legs, especially the left hip flexor. He developed tingling in the right hand and numbness from the left hand radiating up the left arm. On this date, 6/10/19, he was carrying floor tiles at work and immediately after developed progressive symptoms as stated above. \par \par Upon workup he was found to have diffuse cervical spondylosis with severe stenosis and spinal cord compression. Cord signal changes were noted on the MRI as well. \par \par Mr. Stevens is now status post C3-7 decompressive laminectomy, foraminotomy, with lateral mass fixation. Surgery took place on 6/14/19. \par \par Since his last visit his condition remains stable. He has a well healed incision. He is able to walk without assistance. He continues to have residual gait instability however there has been improvement since surgery.He is no longer falling. Since surgery his strength and pain have improved however he continues to have numbness in the left hand. He notes intermittent spasms in the neck. The right hand has improved completely.\par \par He is taking Gabapentin 300 mg TID. He has completed PT. He feels the dexterity in his hands has improved since he is now able to button his shirts and pants. He has been doing home therapy exercises. He continues to have some isolated wrist pain. Due to COVID-19 he was unable to consult with ortho.

## 2020-08-13 ENCOUNTER — APPOINTMENT (OUTPATIENT)
Dept: NEUROSURGERY | Facility: CLINIC | Age: 65
End: 2020-08-13
Payer: OTHER MISCELLANEOUS

## 2020-08-13 VITALS — BODY MASS INDEX: 27.49 KG/M2 | HEIGHT: 70 IN | WEIGHT: 192 LBS

## 2020-08-13 PROCEDURE — 99214 OFFICE O/P EST MOD 30 MIN: CPT

## 2020-08-13 NOTE — HISTORY OF PRESENT ILLNESS
[FreeTextEntry1] : Mr. Stevens presented to the ER on 6/10/19 with progressive weakness in the upper extremities worse on the left side, including the hand, along with weakness in the legs, especially the left hip flexor. He developed tingling in the right hand and numbness from the left hand radiating up the left arm. On this date, 6/10/19, he was carrying floor tiles at work and immediately after developed progressive symptoms as stated above. \par \par Upon workup he was found to have diffuse cervical spondylosis with severe stenosis and spinal cord compression. Cord signal changes were noted on the MRI as well. \par \par Mr. Stevens is now status post C3-7 decompressive laminectomy, foraminotomy, with lateral mass fixation. Surgery took place on 6/14/19. Postoperatively he has done well. \par \par Since his last visit his condition remains stable. He has a well healed incision. He is able to walk without assistance. He continues to have residual gait instability however there has been improvement since surgery. He is no longer falling. Since surgery his strength and pain have improved however he continues to have numbness in the left hand. He notes intermittent spasms in the neck. The right hand has improved completely.\par \par He is taking Gabapentin 300 mg TID. He has completed PT. He feels the dexterity in his hands has improved since he is now able to button his shirts and pants. He has been doing home therapy exercises.

## 2020-08-13 NOTE — ASSESSMENT
[FreeTextEntry1] : Mr. Stevens is doing well. He continues to have residual myelopathy. He will continue home exercise. l will see him back in 2 months for reassessment. I have renewed Gabapentin for him today. He has also requested a prescription for Naproxen to take for joint pain as needed. \par \par \par Disability Status: Totally and permanently disabled. Unable to work at any capacity. \par

## 2020-08-13 NOTE — PHYSICAL EXAM
[FreeTextEntry1] : Alert / Oriented\par No distress\par Gait slow and cautious, walks independently. \par Well healed incision \par Motor exam intact, mild residual left hand  and deltoid weakness. \par Decreased sensation in the hands \par Swelling of the wrists, + cyst right wrist. \par Reflexes brisk\par + Residual Hoffmans. \par

## 2020-10-15 ENCOUNTER — APPOINTMENT (OUTPATIENT)
Dept: NEUROSURGERY | Facility: CLINIC | Age: 65
End: 2020-10-15
Payer: OTHER MISCELLANEOUS

## 2020-10-15 VITALS — WEIGHT: 182 LBS | BODY MASS INDEX: 26.05 KG/M2 | HEIGHT: 70 IN

## 2020-10-15 PROCEDURE — 99214 OFFICE O/P EST MOD 30 MIN: CPT

## 2020-10-15 RX ORDER — METHOCARBAMOL 750 MG/1
750 TABLET, FILM COATED ORAL EVERY 8 HOURS
Qty: 90 | Refills: 1 | Status: DISCONTINUED | COMMUNITY
Start: 2019-10-23 | End: 2020-10-15

## 2020-10-15 NOTE — ASSESSMENT
[FreeTextEntry1] : Mr. Stevens's condition remains stable. He continues to have residual myelopathy. He will continue home exercise. l will see him back in 2 months for reassessment. I have renewed Gabapentin for him today. \par \par Disability Status: Totally and permanently disabled. Unable to work at any capacity. \par  \par

## 2020-10-15 NOTE — HISTORY OF PRESENT ILLNESS
[FreeTextEntry1] : Mr. Stevens presented to the ER on 6/10/19 with progressive weakness in the upper extremities worse on the left side, including the hand, along with weakness in the legs, especially the left hip flexor. He developed tingling in the right hand and numbness from the left hand radiating up the left arm. On this date, 6/10/19, he was carrying floor tiles at work and immediately after developed progressive symptoms as stated above. \par \par Upon workup he was found to have diffuse cervical spondylosis with severe stenosis and spinal cord compression. Cord signal changes were noted on the MRI as well. \par \par Mr. Stevens is now status post C3-7 decompressive laminectomy, foraminotomy, with lateral mass fixation. Surgery took place on 6/14/19. Postoperatively he has done well. \par \par Since his last visit his condition remains stable. He is walking without assistance. He continues to have residual gait instability however there has been improvement since surgery. He is no longer falling. Since surgery his strength and pain have improved however he continues to have numbness in the left hand. He notes intermittent spasms in the neck. The right hand has improved completely. He is on Gabapentin 300 mg TID. Since completing PT he feels the dexterity in his hands has improved since he is now able to button his shirts and pants. He has been doing home therapy exercises.

## 2020-11-29 NOTE — DISCHARGE NOTE PROVIDER - NSDCCONDITION_GEN_ALL_CORE
Problem: GASTROINTESTINAL - ADULT  Goal: Minimal or absence of nausea and vomiting  Description: INTERVENTIONS:  - Maintain adequate hydration with IV or PO as ordered and tolerated  - Nasogastric tube to low intermittent suction as ordered  - Evaluate e as indicated by assessment.  - Educate pt/family on patient safety including physical limitations  - Instruct pt to call for assistance with activity based on assessment  - Modify environment to reduce risk of injury  - Provide assistive devices as appropr assistance  - Assess pain using appropriate pain scale  - Administer analgesics based on type and severity of pain and evaluate response  - Implement non-pharmacological measures as appropriate and evaluate response  - Consider cultural and social influenc Stable

## 2020-12-15 ENCOUNTER — APPOINTMENT (OUTPATIENT)
Dept: NEUROSURGERY | Facility: CLINIC | Age: 65
End: 2020-12-15
Payer: OTHER MISCELLANEOUS

## 2020-12-15 VITALS — BODY MASS INDEX: 26.34 KG/M2 | HEIGHT: 70 IN | WEIGHT: 184 LBS

## 2020-12-15 PROCEDURE — 99214 OFFICE O/P EST MOD 30 MIN: CPT

## 2020-12-15 PROCEDURE — 99072 ADDL SUPL MATRL&STAF TM PHE: CPT

## 2020-12-15 NOTE — PHYSICAL EXAM
[FreeTextEntry1] : \par Alert / Oriented\par No distress\par Gait slow and cautious, walks independently. \par Well healed incision. Posterior cervical muscle atrophy noted. \par Motor exam intact, mild residual left hand  and deltoid weakness. \par Decreased sensation in the hands \par Swelling of the wrists, + cyst right wrist. \par Reflexes brisk\par + Residual Hoffmans.

## 2020-12-15 NOTE — ASSESSMENT
[FreeTextEntry1] : Mr. Kelly continues to have residual myelopathy. He will continue home exercise, posture correction, and stretching. I have given him home exercise sheets to use as well. I have renewed his Gabapentin and Naproxen today. l will see him back in 3 months for reassessment. \par \par Disability Status: Totally and permanently disabled. Unable to work at any capacity. \par  \par

## 2020-12-15 NOTE — HISTORY OF PRESENT ILLNESS
[FreeTextEntry1] : Mr. Stevens presented to the ER on 6/10/19 with progressive weakness in the upper extremities worse on the left side, including the hand, along with weakness in the legs, especially the left hip flexor. He developed tingling in the right hand and numbness from the left hand radiating up the left arm. On this date, 6/10/19, he was carrying floor tiles at work and immediately after developed progressive symptoms as stated above. \par \par Upon workup he was found to have diffuse cervical spondylosis with severe stenosis and spinal cord compression. Cord signal changes were noted on the MRI as well. \par \par Mr. Stevens is now status post C3-7 decompressive laminectomy, foraminotomy, with lateral mass fixation. Surgery took place on 6/14/19. Postoperatively he has done well. \par \par Today, he is walking without assistance. He continues to have residual gait instability however there has been improvement since surgery. He is no longer falling. Since surgery his strength and pain have improved however he continues to have numbness in the left hand. He notes intermittent spasms in the neck. The right hand has improved completely. He is on Gabapentin 300 mg TID. He takes Naproxen PRN for neck pain. He did mention an increase in posterior cervical and trapezial pain / spasms of late. He has been using his IPAD more of late therefore this is probably due to his positioning. Due to insurance issues he is unable to restart PT at this time. He will start stretching and will work on fixing his posture. I have also recommended he use a heating pad PRN.

## 2021-03-25 ENCOUNTER — APPOINTMENT (OUTPATIENT)
Dept: NEUROSURGERY | Facility: CLINIC | Age: 66
End: 2021-03-25
Payer: OTHER MISCELLANEOUS

## 2021-03-25 VITALS — BODY MASS INDEX: 26.34 KG/M2 | WEIGHT: 184 LBS | HEIGHT: 70 IN

## 2021-03-25 PROCEDURE — 99072 ADDL SUPL MATRL&STAF TM PHE: CPT

## 2021-03-25 PROCEDURE — 99214 OFFICE O/P EST MOD 30 MIN: CPT

## 2021-03-25 NOTE — PHYSICAL EXAM
[FreeTextEntry1] : Alert / Oriented\par No distress\par Gait slow and cautious, walks independently. \par Well healed incision. Posterior cervical muscle atrophy noted. \par Motor exam intact, mild residual left hand  and deltoid weakness. \par Decreased sensation in the hands \par Swelling of the wrists, + cyst right wrist. \par Reflexes brisk\par + Residual Hoffmans. \par ROM: decreased in cervical extension. Mild restriction in ROM lumbar spine. \par SLR negative. \par

## 2021-03-25 NOTE — HISTORY OF PRESENT ILLNESS
[FreeTextEntry1] : Mr. Stevens presented to the ER on 6/10/19 with progressive weakness in the upper extremities worse on the left side, including the hand, along with weakness in the legs, especially the left hip flexor. He developed tingling in the right hand and numbness from the left hand radiating up the left arm. On this date, 6/10/19, he was carrying floor tiles at work and immediately after developed progressive symptoms as stated above.   Upon workup he was found to have diffuse cervical spondylosis with severe stenosis and spinal cord compression. Cord signal changes were noted on the MRI as well.   \par \par Mr. Stevens is now status post C3-7 decompressive laminectomy, foraminotomy, with lateral mass fixation. Surgery took place on 6/14/19. Postoperatively he has done well.   He is able to walk without assistance. He continues to have mild residual gait instability however there has been significant improvement since surgery. Since surgery his strength has improved. He continues to have some numbness in the left hand, mainly the fingertips. He cervical ROM is restriction in extension. No bowel / bladder dysfunction. \par \par He is on Gabapentin 300 mg TID and takes Naproxen PRN. He did mention today some isolated lower back pain mostly on the right side. Due to the lack of authorization he has not been doing PT.

## 2021-03-25 NOTE — ASSESSMENT
[FreeTextEntry1] : We had a thorough discussion regarding his condition. Mr. Stevens continues to have residual myelopathy. I have given him cervical and lumbar stretching instruction sheets to do at home. I have encouraged him to work on weight reduction. I have renewed his Gabapentin and Naproxen today. l will see him back in 2 months. He wishes to hold off on further diagnostic testing and treatments for now due to the risk of COVID exposure going to the imaging facilities or PT.  \par \par Disability Status: Totally and permanently disabled. Unable to work at any capacity. \par  \par

## 2021-05-13 ENCOUNTER — APPOINTMENT (OUTPATIENT)
Dept: NEUROSURGERY | Facility: CLINIC | Age: 66
End: 2021-05-13
Payer: OTHER MISCELLANEOUS

## 2021-05-13 VITALS — BODY MASS INDEX: 27.2 KG/M2 | HEIGHT: 70 IN | WEIGHT: 190 LBS

## 2021-05-13 PROCEDURE — 99072 ADDL SUPL MATRL&STAF TM PHE: CPT

## 2021-05-13 PROCEDURE — 99214 OFFICE O/P EST MOD 30 MIN: CPT

## 2021-05-13 NOTE — PHYSICAL EXAM
[FreeTextEntry1] : Alert / Oriented\par No distress\par Incision: healed well. \par Gait: walks independently. \par Well healed incision. Posterior cervical muscle atrophy noted. \par Motor exam intact, mild residual left hand  and deltoid weakness. \par Decreased sensation in the hands \par Swelling of the wrists, + cyst right wrist. \par Reflexes brisk\par + Residual Hoffmans. \par ROM: decreased in cervical extension. Restriction in ROM lumbar spine. \par SLR negative. \par

## 2021-05-13 NOTE — HISTORY OF PRESENT ILLNESS
[FreeTextEntry1] : Mr. Stevens presented to the ER on 6/10/19 with progressive weakness in the upper extremities worse on the left side, including the hand, along with weakness in the legs, especially the left hip flexor. He developed tingling in the right hand and numbness from the left hand radiating up the left arm. On this date, 6/10/19, he was carrying floor tiles at work and immediately after developed progressive symptoms as stated above. Upon workup he was found to have diffuse cervical spondylosis with severe stenosis and spinal cord compression. Cord signal changes were noted on the MRI as well. \par \par Mr. Stevens is now status post C3-7 decompressive laminectomy, foraminotomy, with lateral mass fixation. Surgery took place on 6/14/19. Postoperatively he has done well. He is able to walk without assistance. Since surgery his strength has improved. He continues to have some numbness in the left hand, mainly the fingertips. He cervical ROM is restriction in extension. No bowel / bladder dysfunction. He is on Gabapentin 300 mg TID and takes Naproxen PRN. \par \par Of late he has been experiencing some isolated lower back pain mostly on the right side. Due to the lack of authorization he has not been doing PT. No recent testing. \par  \par

## 2021-05-13 NOTE — ASSESSMENT
[FreeTextEntry1] : We had a thorough discussion regarding his condition. Mr. Stevens continues to have residual myelopathy. I have encouraged him to work on weight reduction. I have renewed his Gabapentin today. I have given him an exercise sheet to follow for his lumbar spine. He will speak with his WC  to see if testing can be ordered. When he is ready I would like MRIs cervical and lumbar spine and EMGs UE's. PT may also be beneficial. He would like to hold off for now and speak with his WC  first before requesting autho for testing / treatment. I will see him back in 1 month.\par \par Disability Status: Totally and permanently disabled. Unable to work at any capacity. \par  \par

## 2021-07-21 ENCOUNTER — APPOINTMENT (OUTPATIENT)
Dept: NEUROSURGERY | Facility: CLINIC | Age: 66
End: 2021-07-21
Payer: OTHER MISCELLANEOUS

## 2021-07-21 VITALS — HEIGHT: 70 IN | WEIGHT: 190 LBS | BODY MASS INDEX: 27.2 KG/M2

## 2021-07-21 PROCEDURE — 99072 ADDL SUPL MATRL&STAF TM PHE: CPT

## 2021-07-21 PROCEDURE — 99214 OFFICE O/P EST MOD 30 MIN: CPT

## 2021-07-21 NOTE — ASSESSMENT
[FreeTextEntry1] : We had a thorough discussion regarding his condition. Mr. Stevens continues to have residual myelopathy. I have encouraged him to work on weight reduction. I have renewed his Gabapentin today. I would like an MRI lumbar spine for further evaluation. In the future a repeat MRI cervical will also be ordered. PT may also be beneficial. He would like to hold off for now pending the results of his MRI lumbar spine. I will see him back once testing is completed. \par \par Disability Status: Totally and permanently disabled. Unable to work at any capacity. \par

## 2021-07-21 NOTE — HISTORY OF PRESENT ILLNESS
[FreeTextEntry1] : Mr. Stevens presented to the ER on 6/10/19 with progressive weakness in the upper extremities worse on the left side, including the hand, along with weakness in the legs, especially the left hip flexor. He developed tingling in the right hand and numbness from the left hand radiating up the left arm. On this date, 6/10/19, he was carrying floor tiles at work and immediately after developed progressive symptoms as stated above. Upon workup he was found to have diffuse cervical spondylosis with severe stenosis and spinal cord compression. Cord signal changes were noted on the MRI as well. \par \par Mr. Stevens is now status post C3-7 decompressive laminectomy, foraminotomy, with lateral mass fixation. Surgery took place on 6/14/19. Postoperatively he has done well. He is able to walk without assistance. Since surgery his strength has improved. He continues to have some numbness in the left hand, mainly the fingertips. He cervical ROM is restriction in extension. No bowel / bladder dysfunction. He is on Gabapentin 300 mg TID and takes Naproxen PRN. \par \par Today, his main concern is right sided lower back pain. At times he will feel referred pain into the right lateral thigh. Due to the lack of authorization he has not been doing PT. No recent testing. \par

## 2021-07-21 NOTE — PHYSICAL EXAM
[FreeTextEntry1] : Alert / Oriented\par No distress\par Incision: healed well. \par Gait: walks independently. \par Well healed incision. Posterior cervical muscle atrophy noted. \par Motor exam intact, mild residual left hand  and deltoid weakness. \par Decreased sensation in the hands \par Swelling of the wrists, + cyst right wrist. \par Reflexes brisk\par + Residual Hoffmans. \par ROM: decreased in cervical extension. Restriction in ROM lumbar spine. + right SI joint tenderness. \par SLR negative. \par

## 2021-09-09 ENCOUNTER — APPOINTMENT (OUTPATIENT)
Dept: NEUROSURGERY | Facility: CLINIC | Age: 66
End: 2021-09-09
Payer: OTHER MISCELLANEOUS

## 2021-09-09 VITALS — HEIGHT: 70 IN | WEIGHT: 190 LBS | BODY MASS INDEX: 27.2 KG/M2

## 2021-09-09 PROCEDURE — 99214 OFFICE O/P EST MOD 30 MIN: CPT

## 2021-09-09 PROCEDURE — 99072 ADDL SUPL MATRL&STAF TM PHE: CPT

## 2021-09-09 NOTE — HISTORY OF PRESENT ILLNESS
[FreeTextEntry1] : Mr. Stevens presented to the ER on 6/10/19 with progressive weakness in the upper extremities worse on the left side, including the hand, along with weakness in the legs, especially the left hip flexor. He developed tingling in the right hand and numbness from the left hand radiating up the left arm. On this date, 6/10/19, he was carrying floor tiles at work and immediately after developed progressive symptoms as stated above. Upon workup he was found to have diffuse cervical spondylosis with severe stenosis and spinal cord compression. Cord signal changes were noted on the MRI as well. \par \par Mr. Stevens is now status post C3-7 decompressive laminectomy, foraminotomy, with lateral mass fixation. Surgery took place on 6/14/19. Postoperatively he has done well. He is able to walk without assistance. Since surgery his strength has improved. He continues to have some numbness in the left hand, mainly the fingertips. He cervical ROM is restriction in extension. No bowel / bladder dysfunction. He is on Gabapentin 300 mg TID and takes Naproxen PRN. \par \par Today, his main concern is right sided lower back pain. At times he will feel referred pain into the right lateral thigh. Due to the lack of authorization he has not been doing PT. No recent testing. MRI authorization pending. \par

## 2021-09-09 NOTE — ASSESSMENT
[FreeTextEntry1] : Mr. Stevens continues to have residual myelopathy. He has done well s/p cervical decompression and fusion. He will remain on Gabapentin. \par \par For evaluation of his lower back and leg pain, he will proceed with the MRI lumbar spine once authorized. \par \par In the future a repeat MRI cervical will also be ordered. PT may also be beneficial. He would like to hold off for now pending the results of his MRI lumbar spine. \par \par I will see him back once testing is completed. \par \par Disability Status: Totally and permanently disabled. Unable to work at any capacity. \par

## 2021-09-30 ENCOUNTER — APPOINTMENT (OUTPATIENT)
Dept: PLASTIC SURGERY | Facility: CLINIC | Age: 66
End: 2021-09-30
Payer: COMMERCIAL

## 2021-09-30 VITALS — HEIGHT: 70 IN | WEIGHT: 185 LBS | BODY MASS INDEX: 26.48 KG/M2

## 2021-09-30 PROCEDURE — 99203 OFFICE O/P NEW LOW 30 MIN: CPT

## 2021-09-30 RX ORDER — MENTHOL/CAMPHOR 0.5 %-0.5%
1000 LOTION (ML) TOPICAL
Refills: 0 | Status: ACTIVE | COMMUNITY

## 2021-09-30 RX ORDER — PSYLLIUM HUSK 0.4 G
CAPSULE ORAL
Refills: 0 | Status: ACTIVE | COMMUNITY

## 2021-09-30 RX ORDER — MULTIVIT-MIN/IRON/FOLIC ACID/K 18-600-40
CAPSULE ORAL
Refills: 0 | Status: ACTIVE | COMMUNITY

## 2021-09-30 NOTE — PHYSICAL EXAM
[NI] : Normal [de-identified] : nasal tip w exposed cartilage w mostly intact perichondrium, wound approx 2.1cm x 2cm

## 2021-09-30 NOTE — HISTORY OF PRESENT ILLNESS
[FreeTextEntry1] : 65 yr old male s/p Mohs two days ago for  BCC of nasal tip\par \par Has substantial defect involving the nasal tip\par \par treated for hep C in 2019 medically--"Im cured"\par states prior HIV test negative\par \par smokes 1/4ppd\par prior h/o facial skin cancer treated w Mohs on nasal dorsum 15 yrs ago +\par \par retired but worked as a [employer] for 45 years

## 2021-09-30 NOTE — ASSESSMENT
[FreeTextEntry1] : Regarding the reconstruction after Mohs surgery, we discussed scarring, risk of repeat procedure, poor wound healing, need for additional revisionary surgery,asymmetry, dissatisfaction with the outcome, and unplanned surgery in the future.  All questions were answered.  All risks were well understood by the patient.\par  \par Regarding the reconstruction after skin cancer  surgery, we discussed scarring, risk of repeat procedure, poor wound healing, need for additional revisionary surgery,asymmetry, dissatisfaction with the outcome, and unplanned surgery in the future.  All questions were answered.  All risks were well understood by the patient.\par \par We discussed the risk of smoking on wound healing and the increased complications that are associated with smoking.\par \par Due to COVID 19, pre-visit patient instructions were explained to the patient and their symptoms were checked upon arrival.  \par Masks were used by the health care providers and staff and the examination room was cleaned after the patient visit was completed.\par \par Photos taken with patient permission.\par \par \par likely forehead flap, possible FTSG\par \par pt aware and wishes for most aesthetcially ideal result and is willing to get multiple procedures if necessary\par \par reviewed photos of forehead flap w pt--al ?s answered\par \par

## 2021-10-06 ENCOUNTER — OUTPATIENT (OUTPATIENT)
Dept: OUTPATIENT SERVICES | Facility: HOSPITAL | Age: 66
LOS: 1 days | Discharge: HOME | End: 2021-10-06
Payer: COMMERCIAL

## 2021-10-06 ENCOUNTER — RESULT REVIEW (OUTPATIENT)
Age: 66
End: 2021-10-06

## 2021-10-06 VITALS
DIASTOLIC BLOOD PRESSURE: 68 MMHG | WEIGHT: 186.51 LBS | OXYGEN SATURATION: 99 % | HEIGHT: 70 IN | SYSTOLIC BLOOD PRESSURE: 128 MMHG | RESPIRATION RATE: 16 BRPM | TEMPERATURE: 98 F | HEART RATE: 61 BPM

## 2021-10-06 DIAGNOSIS — Z01.818 ENCOUNTER FOR OTHER PREPROCEDURAL EXAMINATION: ICD-10-CM

## 2021-10-06 DIAGNOSIS — Z98.1 ARTHRODESIS STATUS: Chronic | ICD-10-CM

## 2021-10-06 DIAGNOSIS — C44.311 BASAL CELL CARCINOMA OF SKIN OF NOSE: ICD-10-CM

## 2021-10-06 LAB
ALBUMIN SERPL ELPH-MCNC: 5 G/DL — SIGNIFICANT CHANGE UP (ref 3.5–5.2)
ALP SERPL-CCNC: 95 U/L — SIGNIFICANT CHANGE UP (ref 30–115)
ALT FLD-CCNC: 15 U/L — SIGNIFICANT CHANGE UP (ref 0–41)
ANION GAP SERPL CALC-SCNC: 14 MMOL/L — SIGNIFICANT CHANGE UP (ref 7–14)
APTT BLD: 33.6 SEC — SIGNIFICANT CHANGE UP (ref 27–39.2)
AST SERPL-CCNC: 18 U/L — SIGNIFICANT CHANGE UP (ref 0–41)
BASOPHILS # BLD AUTO: 0.07 K/UL — SIGNIFICANT CHANGE UP (ref 0–0.2)
BASOPHILS NFR BLD AUTO: 1.1 % — HIGH (ref 0–1)
BILIRUB SERPL-MCNC: 0.3 MG/DL — SIGNIFICANT CHANGE UP (ref 0.2–1.2)
BUN SERPL-MCNC: 20 MG/DL — SIGNIFICANT CHANGE UP (ref 10–20)
CALCIUM SERPL-MCNC: 9.7 MG/DL — SIGNIFICANT CHANGE UP (ref 8.5–10.1)
CHLORIDE SERPL-SCNC: 101 MMOL/L — SIGNIFICANT CHANGE UP (ref 98–110)
CO2 SERPL-SCNC: 25 MMOL/L — SIGNIFICANT CHANGE UP (ref 17–32)
CREAT SERPL-MCNC: 1 MG/DL — SIGNIFICANT CHANGE UP (ref 0.7–1.5)
EOSINOPHIL # BLD AUTO: 0.3 K/UL — SIGNIFICANT CHANGE UP (ref 0–0.7)
EOSINOPHIL NFR BLD AUTO: 4.6 % — SIGNIFICANT CHANGE UP (ref 0–8)
GLUCOSE SERPL-MCNC: 78 MG/DL — SIGNIFICANT CHANGE UP (ref 70–99)
HCT VFR BLD CALC: 42.1 % — SIGNIFICANT CHANGE UP (ref 42–52)
HGB BLD-MCNC: 14.7 G/DL — SIGNIFICANT CHANGE UP (ref 14–18)
IMM GRANULOCYTES NFR BLD AUTO: 0.3 % — SIGNIFICANT CHANGE UP (ref 0.1–0.3)
INR BLD: 1.04 RATIO — SIGNIFICANT CHANGE UP (ref 0.65–1.3)
LYMPHOCYTES # BLD AUTO: 2.29 K/UL — SIGNIFICANT CHANGE UP (ref 1.2–3.4)
LYMPHOCYTES # BLD AUTO: 34.7 % — SIGNIFICANT CHANGE UP (ref 20.5–51.1)
MCHC RBC-ENTMCNC: 31.4 PG — HIGH (ref 27–31)
MCHC RBC-ENTMCNC: 34.9 G/DL — SIGNIFICANT CHANGE UP (ref 32–37)
MCV RBC AUTO: 90 FL — SIGNIFICANT CHANGE UP (ref 80–94)
MONOCYTES # BLD AUTO: 0.59 K/UL — SIGNIFICANT CHANGE UP (ref 0.1–0.6)
MONOCYTES NFR BLD AUTO: 9 % — SIGNIFICANT CHANGE UP (ref 1.7–9.3)
NEUTROPHILS # BLD AUTO: 3.32 K/UL — SIGNIFICANT CHANGE UP (ref 1.4–6.5)
NEUTROPHILS NFR BLD AUTO: 50.3 % — SIGNIFICANT CHANGE UP (ref 42.2–75.2)
NRBC # BLD: 0 /100 WBCS — SIGNIFICANT CHANGE UP (ref 0–0)
PLATELET # BLD AUTO: 195 K/UL — SIGNIFICANT CHANGE UP (ref 130–400)
POTASSIUM SERPL-MCNC: 4.3 MMOL/L — SIGNIFICANT CHANGE UP (ref 3.5–5)
POTASSIUM SERPL-SCNC: 4.3 MMOL/L — SIGNIFICANT CHANGE UP (ref 3.5–5)
PROT SERPL-MCNC: 7.9 G/DL — SIGNIFICANT CHANGE UP (ref 6–8)
PROTHROM AB SERPL-ACNC: 12 SEC — SIGNIFICANT CHANGE UP (ref 9.95–12.87)
RBC # BLD: 4.68 M/UL — LOW (ref 4.7–6.1)
RBC # FLD: 12.8 % — SIGNIFICANT CHANGE UP (ref 11.5–14.5)
SODIUM SERPL-SCNC: 140 MMOL/L — SIGNIFICANT CHANGE UP (ref 135–146)
WBC # BLD: 6.59 K/UL — SIGNIFICANT CHANGE UP (ref 4.8–10.8)
WBC # FLD AUTO: 6.59 K/UL — SIGNIFICANT CHANGE UP (ref 4.8–10.8)

## 2021-10-06 PROCEDURE — 93010 ELECTROCARDIOGRAM REPORT: CPT

## 2021-10-06 PROCEDURE — 71046 X-RAY EXAM CHEST 2 VIEWS: CPT | Mod: 26

## 2021-10-06 NOTE — H&P PST ADULT - CONSTITUTIONAL COMMENTS
pt able to move neck freely- right/left  forward and extension.  pt has large band aid covering center of nose.

## 2021-10-06 NOTE — H&P PST ADULT - REASON FOR ADMISSION
Patient is a 65   year old   male presenting to PAST in preparation for   forehead flap for nasal wound possible full thickness skin graft  to nose  donor site  right/left face.  on    10/18/21  under   gen  anesthesia by Dr. Trevino .  Pt reports-- I have a basal cell on my nose.    I have no pain in the area.

## 2021-10-06 NOTE — H&P PST ADULT - HISTORY OF PRESENT ILLNESS
PT PRESENTS TO PAST WITH NO SOB, CP, PALPITATIONS, DYSURIA, UTI OR URI AT PRESENT.   PT ABLE TO WALK UP 2-3 FLIGHTS OF STEPS WITH NO SOB.  pt denies any covid s/s, or tested positive in the past- pt aware of date and time for covid testing.   pt advised self quarantine till day of procedure  denies travel outside the [place] in the past 30 days  AS PER THE PT, THIS IS HIS/HER COMPLETE MEDICAL AND SURGICAL HX, INCLUDING MEDICATIONS PRESCRIBED AND OVER THE COUNTER  Anesthesia Alert  NO--Difficult Airway  yes  --History of neck surgery or radiation  NO--Limited ROM of neck  NO--History of Malignant hyperthermia  NO--Personal or family history of Pseudocholinesterase deficiency  NO--Prior Anesthesia Complication  NO--Latex Allergy  NO--Loose teeth  NO--History of Rheumatoid Arthritis  NO--LUNA  NO BLEEDING RISK  NO--Other_____

## 2021-10-15 ENCOUNTER — OUTPATIENT (OUTPATIENT)
Dept: OUTPATIENT SERVICES | Facility: HOSPITAL | Age: 66
LOS: 1 days | Discharge: HOME | End: 2021-10-15

## 2021-10-15 ENCOUNTER — LABORATORY RESULT (OUTPATIENT)
Age: 66
End: 2021-10-15

## 2021-10-15 DIAGNOSIS — Z11.59 ENCOUNTER FOR SCREENING FOR OTHER VIRAL DISEASES: ICD-10-CM

## 2021-10-15 DIAGNOSIS — Z98.1 ARTHRODESIS STATUS: Chronic | ICD-10-CM

## 2021-10-15 PROBLEM — B19.20 UNSPECIFIED VIRAL HEPATITIS C WITHOUT HEPATIC COMA: Chronic | Status: ACTIVE | Noted: 2021-10-06

## 2021-10-15 NOTE — ASU PATIENT PROFILE, ADULT - ACCEPTABLE
[facility] [address] Bell's palsy [phone] & 1997    Borderline personality disorder ([facility] [address]     BRIANNA Johnson - CRNA   8/17/2021 0

## 2021-10-18 ENCOUNTER — APPOINTMENT (OUTPATIENT)
Dept: PLASTIC SURGERY | Facility: AMBULATORY SURGERY CENTER | Age: 66
End: 2021-10-18
Payer: COMMERCIAL

## 2021-10-18 ENCOUNTER — OUTPATIENT (OUTPATIENT)
Dept: OUTPATIENT SERVICES | Facility: HOSPITAL | Age: 66
LOS: 1 days | Discharge: HOME | End: 2021-10-18

## 2021-10-18 VITALS
OXYGEN SATURATION: 99 % | HEART RATE: 74 BPM | RESPIRATION RATE: 19 BRPM | SYSTOLIC BLOOD PRESSURE: 145 MMHG | DIASTOLIC BLOOD PRESSURE: 80 MMHG

## 2021-10-18 VITALS
SYSTOLIC BLOOD PRESSURE: 135 MMHG | DIASTOLIC BLOOD PRESSURE: 77 MMHG | HEART RATE: 60 BPM | TEMPERATURE: 98 F | WEIGHT: 186.51 LBS | HEIGHT: 70 IN | OXYGEN SATURATION: 99 % | RESPIRATION RATE: 18 BRPM

## 2021-10-18 DIAGNOSIS — Z98.1 ARTHRODESIS STATUS: Chronic | ICD-10-CM

## 2021-10-18 PROCEDURE — 15260 FTH/GFT FR N/E/E/L 20 SQCM/<: CPT

## 2021-10-18 PROCEDURE — 15004 WOUND PREP F/N/HF/G: CPT

## 2021-10-18 RX ORDER — CEPHALEXIN 500 MG
1 CAPSULE ORAL
Qty: 12 | Refills: 0
Start: 2021-10-18 | End: 2021-10-20

## 2021-10-18 RX ORDER — HYDROMORPHONE HYDROCHLORIDE 2 MG/ML
0.5 INJECTION INTRAMUSCULAR; INTRAVENOUS; SUBCUTANEOUS
Refills: 0 | Status: DISCONTINUED | OUTPATIENT
Start: 2021-10-18 | End: 2021-10-18

## 2021-10-18 RX ORDER — TRAMADOL HYDROCHLORIDE 50 MG/1
1 TABLET ORAL
Qty: 10 | Refills: 0
Start: 2021-10-18

## 2021-10-18 NOTE — CHART NOTE - NSCHARTNOTEFT_GEN_A_CORE
PACU ANESTHESIA ADMISSION NOTE      Procedure: Application of full thickness skin autograft to head  to nose from right face      Post op diagnosis:  Mohs defect of nose      __x__  Patent Airway    __x__  Full return of protective reflexes    __x__  Full recovery from anesthesia / back to baseline     Vitals:   see anesthesia record      Mental Status:  __x__ Awake   _____ Alert   _____ Drowsy   _____ Sedated    Nausea/Vomiting:  __x__ NO  ______Yes,   See Post - Op Orders          Pain Scale (0-10):  _____    Treatment: ____ None    ___x_ See Post - Op/PCA Orders    Post - Operative Fluids:   ____ Oral   __x__ See Post - Op Orders    Plan: Discharge:   __x__Home       _____Floor     _____Critical Care    _____  Other:_________________    Comments:  Uneventful intraoperative course. No anesthesia issues or complications noted. Patient stable upon arrival to PACU. Report given to RN. Discharge when criteria met.

## 2021-10-18 NOTE — ASU DISCHARGE PLAN (ADULT/PEDIATRIC) - ASU DC SPECIAL INSTRUCTIONSFT
-Keep dressings clean and dry. Do not remove.  -Take antibiotics for 3 days as prescribed.  -Take Tylenol as needed for pain. If not well controlled, take Tramadol as needed.  -Sleep with the head of the bed elevated using pillows to help prevent swelling.  -Apply ice 2-3x a day for the next 3 days, 10-15 minutes at a time.   -No gym or strenuous activity until cleared.  -No driving if taking pain medication.  -Bruising, swelling, and numbness are normal and are expected to improve over time.  -Call the office anytime for questions or concerns.  -Follow up in 1 week.

## 2021-10-18 NOTE — BRIEF OPERATIVE NOTE - NSICDXBRIEFPROCEDURE_GEN_ALL_CORE_FT
PROCEDURES:  Application of full thickness skin autograft to head 18-Oct-2021 15:24:49 to nose from right face Jamilah Matthew

## 2021-10-23 DIAGNOSIS — C44.301 UNSPECIFIED MALIGNANT NEOPLASM OF SKIN OF NOSE: ICD-10-CM

## 2021-10-23 DIAGNOSIS — Z86.19 PERSONAL HISTORY OF OTHER INFECTIOUS AND PARASITIC DISEASES: ICD-10-CM

## 2021-10-23 DIAGNOSIS — Z48.3 AFTERCARE FOLLOWING SURGERY FOR NEOPLASM: ICD-10-CM

## 2021-10-23 DIAGNOSIS — I10 ESSENTIAL (PRIMARY) HYPERTENSION: ICD-10-CM

## 2021-10-23 DIAGNOSIS — Z98.1 ARTHRODESIS STATUS: ICD-10-CM

## 2021-10-25 ENCOUNTER — APPOINTMENT (OUTPATIENT)
Dept: PLASTIC SURGERY | Facility: CLINIC | Age: 66
End: 2021-10-25
Payer: COMMERCIAL

## 2021-10-25 PROCEDURE — 99024 POSTOP FOLLOW-UP VISIT: CPT

## 2021-10-25 NOTE — PHYSICAL EXAM
[NI] : Normal [de-identified] : right preauricular donor site c/d/i with no s/s cellulitis [de-identified] : nasal tip with healing skin graft, 100% take, no periwound erythema or swelling

## 2021-10-25 NOTE — HISTORY OF PRESENT ILLNESS
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 Doing well. Denies significant pain, f/c, or bleeding.

## 2021-10-25 NOTE — ASSESSMENT
[FreeTextEntry1] : 64 y/o F POD #7 s/p reconstruction of nasal Mohs defect with FTSG from right face, doing well with excellent graft take\par \par -Sutures removed\par -Daily aquaphor to both sites\par -May cleanse gently\par -HOB elevation\par - Postop instructions reviewed and all questions answered\par -F/u 2-3 weeks for graft check\par \par Due to COVID-19, pre-visit patient instructions were explained to the patient and their symptoms were checked upon arrival. Masks were used by the healthcare provider and staff and the examination room was cleaned after the patient visit concluded\par \par

## 2021-11-03 ENCOUNTER — APPOINTMENT (OUTPATIENT)
Dept: NEUROSURGERY | Facility: CLINIC | Age: 66
End: 2021-11-03
Payer: OTHER MISCELLANEOUS

## 2021-11-03 VITALS — BODY MASS INDEX: 26.48 KG/M2 | WEIGHT: 185 LBS | HEIGHT: 70 IN

## 2021-11-03 DIAGNOSIS — M53.3 SACROCOCCYGEAL DISORDERS, NOT ELSEWHERE CLASSIFIED: ICD-10-CM

## 2021-11-03 PROCEDURE — 99214 OFFICE O/P EST MOD 30 MIN: CPT

## 2021-11-03 PROCEDURE — 99072 ADDL SUPL MATRL&STAF TM PHE: CPT

## 2021-11-03 RX ORDER — GABAPENTIN 300 MG/1
300 CAPSULE ORAL
Qty: 90 | Refills: 2 | Status: DISCONTINUED | COMMUNITY
Start: 2019-07-18 | End: 2021-11-03

## 2021-11-03 NOTE — HISTORY OF PRESENT ILLNESS
[FreeTextEntry1] : Mr. Stevens presented to the ER on 6/10/19 with progressive weakness in the upper extremities worse on the left side, including the hand, along with weakness in the legs, especially the left hip flexor. He developed tingling in the right hand and numbness from the left hand radiating up the left arm. On this date, 6/10/19, he was carrying floor tiles at work and immediately after developed progressive symptoms as stated above. Upon workup he was found to have diffuse cervical spondylosis with severe stenosis and spinal cord compression. Cord signal changes were noted on the MRI as well. \par \par Mr. Stevens is now status post C3-7 decompressive laminectomy, foraminotomy, with lateral mass fixation. Surgery took place on 6/14/19 by Dr. Khoury. Postoperatively he has done well. He is able to walk without assistance. Since surgery his strength has improved. He continues to have some numbness in the left hand, mainly the fingertips. He cervical ROM is restriction in extension. No bowel / bladder dysfunction. He is on Gabapentin 300 mg TID and Meloxicam PRN. \par \par Today, his main concern is right sided lower back pain. At times he will feel referred pain into the right lateral thigh. Due to the lack of authorization he has not been doing PT. No recent testing. MRI authorization pending. \par

## 2021-11-03 NOTE — ASSESSMENT
[FreeTextEntry1] : Mr. Stevens continues to have residual myelopathy. He has done well s/p cervical decompression and fusion. He will remain on Gabapentin. \par \par For his ower back and leg pain, he will proceed with the MRI lumbar spine once authorized and will take Meloxicam PRN. If  denies the MRI then he will use his private medical insurance. \par \par In the future a repeat MRI cervical will also be ordered. PT may also be beneficial. He would like to hold off for now pending the results of his MRI lumbar spine. \par \par I will see him back once testing is completed. \par \par Medications renewed today.\par \par Disability Status: Totally and permanently disabled. Unable to work at any capacity. \par

## 2021-11-08 ENCOUNTER — APPOINTMENT (OUTPATIENT)
Dept: PLASTIC SURGERY | Facility: AMBULATORY SURGERY CENTER | Age: 66
End: 2021-11-08

## 2021-11-15 ENCOUNTER — APPOINTMENT (OUTPATIENT)
Dept: PLASTIC SURGERY | Facility: CLINIC | Age: 66
End: 2021-11-15

## 2021-11-16 ENCOUNTER — APPOINTMENT (OUTPATIENT)
Dept: PLASTIC SURGERY | Facility: CLINIC | Age: 66
End: 2021-11-16
Payer: COMMERCIAL

## 2021-11-16 PROCEDURE — 99024 POSTOP FOLLOW-UP VISIT: CPT

## 2021-11-16 NOTE — ASSESSMENT
[FreeTextEntry1] : 66 y/o F 3 weeks s/p reconstruction of nasal Mohs defect with FTSG from right face, doing well with excellent graft take\par \par -Daily aquaphor to both sites\par -May cleanse gently\par - Postop instructions reviewed and all questions answered\par \par \par Due to COVID-19, pre-visit patient instructions were explained to the patient and their symptoms were checked upon arrival. Masks were used by the healthcare provider and staff and the examination room was cleaned after the patient visit concluded\par \par 11/16/2021\par as above\par doing well\par stop aquafor\par start scarguard\par massage\par \par Wound care instructions given.\par \par Due to COVID 19, pre-visit patient instructions were explained to the patient and their symptoms were checked upon arrival.  \par Masks were used by the health care providers and staff and the examination room was cleaned after the patient visit was completed.\par \par f/u 6 months\par

## 2021-11-16 NOTE — HISTORY OF PRESENT ILLNESS
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 Doing well. Denies significant pain, f/c, or bleeding.\par \par Interval hx (11/16/21): Pt presents today 3 weeks s/p reconstruction of nasal Mohs defect with FTSG from right face. Doing well. Denies significant pain, f/c, or bleeding.

## 2021-11-16 NOTE — PHYSICAL EXAM
[NI] : Normal [de-identified] : right preauricular donor site c/d/i with no s/s cellulitis [de-identified] : nasal tip with healing skin graft, 100% take, no periwound erythema or swelling

## 2021-12-02 ENCOUNTER — APPOINTMENT (OUTPATIENT)
Dept: NEUROSURGERY | Facility: CLINIC | Age: 66
End: 2021-12-02
Payer: OTHER MISCELLANEOUS

## 2021-12-02 PROCEDURE — 99214 OFFICE O/P EST MOD 30 MIN: CPT

## 2021-12-02 PROCEDURE — 99072 ADDL SUPL MATRL&STAF TM PHE: CPT

## 2021-12-02 NOTE — ASSESSMENT
[FreeTextEntry1] : Mr. Stevens continues to have residual myelopathy. He has done well s/p cervical decompression and fusion. He will remain on Gabapentin and Mobic PRN. Mobic renewed today. In the future a repeat MRI cervical will also be ordered. PT may also be beneficial. He would like to hold off for now. \par \par Disability Status: Totally and permanently disabled. Unable to work at any capacity. \par

## 2021-12-02 NOTE — HISTORY OF PRESENT ILLNESS
[FreeTextEntry1] : Mr. Stevens presented to the ER on 6/10/19 with progressive weakness in the upper extremities worse on the left side, including the hand, along with weakness in the legs, especially the left hip flexor. He developed tingling in the right hand and numbness from the left hand radiating up the left arm. On this date, 6/10/19, he was carrying floor tiles at work and immediately after developed progressive symptoms as stated above. Upon workup he was found to have diffuse cervical spondylosis with severe stenosis and spinal cord compression. Cord signal changes were noted on the MRI as well. \par \par Mr. Stevens is now status post C3-7 decompressive laminectomy, foraminotomy, with lateral mass fixation. Surgery took place on 6/14/19 by Dr. Khoury. Postoperatively he has done well. He is able to walk without assistance. Since surgery his strength has improved. He continues to have some numbness in the left hand, mainly the fingertips. He cervical ROM is restriction in extension. No bowel / bladder dysfunction. He is on Gabapentin 300 mg TID and Meloxicam PRN. Condition is stable and unchanged when compared to last visit.

## 2021-12-02 NOTE — PHYSICAL EXAM
[FreeTextEntry1] : Alert / Oriented\par No distress\par Incision: healed well. \par Gait: walks independently. \par Well healed incision. Posterior cervical muscle atrophy noted. \par Motor exam intact, mild residual left hand  and deltoid weakness. \par Decreased sensation in the hands \par + Residual Hoffmans. \par ROM: decreased in cervical extension.\par

## 2022-01-13 ENCOUNTER — APPOINTMENT (OUTPATIENT)
Dept: NEUROSURGERY | Facility: CLINIC | Age: 67
End: 2022-01-13
Payer: OTHER MISCELLANEOUS

## 2022-01-13 VITALS — BODY MASS INDEX: 26.48 KG/M2 | HEIGHT: 70 IN | WEIGHT: 185 LBS

## 2022-01-13 PROCEDURE — 99214 OFFICE O/P EST MOD 30 MIN: CPT

## 2022-01-13 PROCEDURE — 99072 ADDL SUPL MATRL&STAF TM PHE: CPT

## 2022-01-13 NOTE — ASSESSMENT
[FreeTextEntry1] : Mr. Stevens condition remains stable. He will remain on Gabapentin and Mobic PRN. I will see him back in 3 months. \par  \par Disability Status: Totally and permanently disabled. Unable to work at any capacity. \par

## 2022-01-13 NOTE — PHYSICAL EXAM
[FreeTextEntry1] : Constitutional: Well appearing, no distress\par HEENT: Normocephalic Atraumatic\par Psychiatric: Alert and oriented to all spheres, normal mood\par Pulmonary: No respiratory distress\par \par Neurologic: \par CN II-XII grossly intact\par Well healed incision. \par Posterior cervical muscle atrophy\par Palpation: no pain to palpation \par Motor exam intact, mild residual left hand  and deltoid weakness. \par Decreased sensation in the hands \par + Residual Hoffmans. \par Restriction in ROM cervical  spine. \par Gait: steady, walking w/o assistance. \par \par

## 2022-01-13 NOTE — HISTORY OF PRESENT ILLNESS
[FreeTextEntry1] : Mr. Stevens presented to the ER on 6/10/19 with progressive weakness in the upper extremities worse on the left side, including the hand, along with weakness in the legs, especially the left hip flexor. He developed tingling in the right hand and numbness from the left hand radiating up the left arm. On this date, 6/10/19, he was carrying floor tiles at work and immediately after developed progressive symptoms as stated above. Upon workup he was found to have diffuse cervical spondylosis with severe stenosis and spinal cord compression. Cord signal changes were noted on the MRI as well. \par \par Mr. Stevens is now status post C3-7 decompressive laminectomy, foraminotomy, with lateral mass fixation. Surgery took place on 6/14/19 by Dr. Khoury. Postoperatively he has done well. He is able to walk without assistance. Since surgery his strength has improved. He continues to have some numbness in the left hand, mainly the fingertips. His cervical ROM is restricted. No bowel / bladder dysfunction. He is on Gabapentin 300 mg TID and Meloxicam PRN. \par \par Condition is stable and unchanged when compared to last visit. He feels intermittent stiffness in his neck however no acute symptoms.

## 2022-02-28 RX ORDER — GABAPENTIN 300 MG/1
300 CAPSULE ORAL 3 TIMES DAILY
Qty: 90 | Refills: 2 | Status: ACTIVE | COMMUNITY
Start: 2020-06-11 | End: 1900-01-01

## 2022-04-14 ENCOUNTER — APPOINTMENT (OUTPATIENT)
Dept: NEUROSURGERY | Facility: CLINIC | Age: 67
End: 2022-04-14
Payer: OTHER MISCELLANEOUS

## 2022-04-14 DIAGNOSIS — M47.22 OTHER SPONDYLOSIS WITH MYELOPATHY, CERVICAL REGION: ICD-10-CM

## 2022-04-14 DIAGNOSIS — M47.12 OTHER SPONDYLOSIS WITH MYELOPATHY, CERVICAL REGION: ICD-10-CM

## 2022-04-14 PROCEDURE — 99441: CPT

## 2022-04-14 NOTE — REASON FOR VISIT
[Home] : at home, [unfilled] , at the time of the visit. [Medical Office: ([facility])___] : at the medical office located in  [Verbal consent obtained from patient] : the patient, [unfilled]

## 2022-04-14 NOTE — ASSESSMENT
[FreeTextEntry1] : Mr. Stevens condition remains stable. We discussed the option of a follow up MRI cervical however he prefers to hold off for now. He will remain on Gabapentin and Mobic PRN. I will see him back in 6 months. \par  \par Disability Status: Totally and permanently disabled. Unable to work at any capacity. \par

## 2022-04-14 NOTE — HISTORY OF PRESENT ILLNESS
[FreeTextEntry1] : Mr. Stevens presented to the ER on 6/10/19 with progressive weakness in the upper extremities worse on the left side, including the hand, along with weakness in the legs, especially the left hip flexor. He developed tingling in the right hand and numbness from the left hand radiating up the left arm. On this date, 6/10/19, he was carrying floor tiles at work and immediately after developed progressive symptoms as stated above. Upon workup he was found to have diffuse cervical spondylosis with severe stenosis and spinal cord compression. Cord signal changes were noted on the MRI as well. \par \par Mr. Stevens is status post C3-7 decompressive laminectomy, foraminotomy, with lateral mass fixation. Surgery took place on 6/14/19 by Dr. Khoury. Postoperatively he has done well. He is able to walk without assistance. Since surgery his strength and claw hand has improved. He continues to have some numbness in the left hand, mainly the fingertips. His cervical ROM is restricted. No bowel / bladder dysfunction. He is on Gabapentin 300 mg TID and Meloxicam PRN. \par \par Condition is stable and unchanged when compared to last visit. He feels intermittent stiffness in his neck however no acute symptoms.

## 2022-05-10 ENCOUNTER — APPOINTMENT (OUTPATIENT)
Dept: NEUROSURGERY | Facility: CLINIC | Age: 67
End: 2022-05-10
Payer: MEDICARE

## 2022-05-10 VITALS — HEIGHT: 70 IN | WEIGHT: 190 LBS | BODY MASS INDEX: 27.2 KG/M2

## 2022-05-10 DIAGNOSIS — M47.818 SPONDYLOSIS W/OUT MYELOPATHY OR RADICULOPATHY, SACRAL AND SACROCOCCYGEAL REGION: ICD-10-CM

## 2022-05-10 PROCEDURE — 99213 OFFICE O/P EST LOW 20 MIN: CPT

## 2022-05-10 NOTE — HISTORY OF PRESENT ILLNESS
[FreeTextEntry1] : Mr. HAYES presents today for evaluation of lower back pain.  This is under his private medical insurance.  He has persistent lower back pain with radiation into the hips and at times into the lateral thighs.  It is worse on the right side.  No recent physical therapy or injections.  No recent imaging. \par \par

## 2022-05-10 NOTE — PHYSICAL EXAM
[FreeTextEntry1] : Constitutional: Well appearing, no distress\par HEENT: Normocephalic Atraumatic\par Psychiatric: Alert and oriented to all spheres, normal mood\par Pulmonary: No respiratory distress\par \par Neurologic: \par CN II-XII grossly intact\par Palpation: + Lumbar/SI joint tenderness.\par Strength: Full strength lower extremities.\par Sensation: Full sensation lower extremities.\par Reflexes: Brisk.\par Restriction in ROM lumbar spine.\par SLR negative\par Gait: steady, walking w/o assistance. \par \par \par \par  \par \par

## 2022-05-10 NOTE — ASSESSMENT
[FreeTextEntry1] : Mr. HAYES will proceed an MRI of the lumbar spine to evaluate for underlying degenerative changes and x-rays of his pelvis to assess his hip and SI joints.  He will also start physical therapy.  I will see him back once testing is completed.  He is agreeable.

## 2022-05-17 ENCOUNTER — APPOINTMENT (OUTPATIENT)
Dept: PLASTIC SURGERY | Facility: CLINIC | Age: 67
End: 2022-05-17
Payer: MEDICARE

## 2022-05-17 PROCEDURE — 99212 OFFICE O/P EST SF 10 MIN: CPT

## 2022-05-17 NOTE — PHYSICAL EXAM
[de-identified] : right preauricular donor site healed  [de-identified] : nasal tip graft mature, no evidence of recurrence

## 2022-05-17 NOTE — HISTORY OF PRESENT ILLNESS
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 Doing well. Denies significant pain, f/c, or bleeding.\par \par Interval hx (11/16/21): Pt presents today 3 weeks s/p reconstruction of nasal Mohs defect with FTSG from right face. Doing well. Denies significant pain, f/c, or bleeding.\par \par Interval hx (5/17/22): Pt presents today 8 months s/p reconstruction of nasal Mohs defect with FTSG from right face. Doing well.

## 2022-05-17 NOTE — ASSESSMENT
[FreeTextEntry1] : 67 y/o F now 8 months s/p reconstruction of nasal Mohs defect with FTSG from right face, doing well with excellent graft take.\par \par -Continue Scarguard and scar massage \par -Dermatologic surveillance\par -F/U 6 months \par \par \par Due to COVID-19, pre-visit patient instructions were explained to the patient and their symptoms were checked upon arrival. Masks were used by the healthcare provider and staff and the examination room was cleaned after the patient visit concluded\par \par \par 5/17/2022\par as above\par has concerns w shape of right nostril--has mild notching\par s/p FTSG to nasal tip in Oct 2021\par \par requetsed revision\par \par would be better in [place] but pt prefers in office\par \par pt aware limitationi n oimprovement if revision done in office\par \par he will think about re timing-likely fall 2022\par \par Due to COVID 19, pre-visit patient instructions were explained to the patient and their symptoms were checked upon arrival.  \par Masks were used by the health care providers and staff and the examination room was cleaned after the patient visit was completed.\par \par \par

## 2022-05-24 ENCOUNTER — RESULT REVIEW (OUTPATIENT)
Age: 67
End: 2022-05-24

## 2022-05-24 ENCOUNTER — OUTPATIENT (OUTPATIENT)
Dept: OUTPATIENT SERVICES | Facility: HOSPITAL | Age: 67
LOS: 1 days | Discharge: HOME | End: 2022-05-24
Payer: MEDICARE

## 2022-05-24 DIAGNOSIS — M54.50 LOW BACK PAIN, UNSPECIFIED: ICD-10-CM

## 2022-05-24 DIAGNOSIS — Z98.1 ARTHRODESIS STATUS: Chronic | ICD-10-CM

## 2022-05-24 PROCEDURE — 72148 MRI LUMBAR SPINE W/O DYE: CPT | Mod: 26,MH

## 2022-05-24 PROCEDURE — 72170 X-RAY EXAM OF PELVIS: CPT | Mod: 26

## 2022-06-07 RX ORDER — MELOXICAM 15 MG/1
15 TABLET ORAL
Qty: 30 | Refills: 1 | Status: ACTIVE | COMMUNITY
Start: 2021-09-09 | End: 1900-01-01

## 2022-06-13 NOTE — PRE-ANESTHESIA EVALUATION ADULT - NSANTHADDINFOFT_GEN_ALL_CORE
proximal Discussed risks and benefits of anesthesia including but not limited to the risk of sore throat, N/V, damage to mouth, teeth and lips, stroke, MI and even death.  Patient demonstrates understanding, all questions answered. The patient wishes to proceed with planned treatment.

## 2022-06-14 ENCOUNTER — APPOINTMENT (OUTPATIENT)
Dept: NEUROSURGERY | Facility: CLINIC | Age: 67
End: 2022-06-14
Payer: MEDICARE

## 2022-06-14 VITALS — HEIGHT: 70 IN | BODY MASS INDEX: 27.2 KG/M2 | WEIGHT: 190 LBS

## 2022-06-14 DIAGNOSIS — M53.3 SACROCOCCYGEAL DISORDERS, NOT ELSEWHERE CLASSIFIED: ICD-10-CM

## 2022-06-14 PROCEDURE — 99214 OFFICE O/P EST MOD 30 MIN: CPT

## 2022-06-14 NOTE — ASSESSMENT
[FreeTextEntry1] : Mr. HAYES will proceed a nuclear medicine bone scan to further assess the incidental lesion in the sacrum (right side).  Continue with physical therapy.  I will see him back once the bone scan is complete.

## 2022-06-14 NOTE — HISTORY OF PRESENT ILLNESS
[FreeTextEntry1] : Mr. HAYES continues to experience low back pain with neurogenic claudication.  His back pain radiates into his hips and at times into the lateral thighs.  Since his last visit he has started physical therapy.  He reports some improvement in his pain and range of motion.\par \par Today we have reviewed an MRI of the lumbar spine from 5/24/2022, [facility]Z.  He is found to have diffuse lumbar degenerative disc disease with severe stenosis at L3-4 and mild to moderate stenosis at the adjacent segment.  There is a dextrocurvature centered at L1 with multiple retrolisthesis he is T11-L4.  He has Modic changes at the endplates.  Incidentally noted is a rounded focus of marrow signal abnormality within the right aspect of the sacrum measuring 1.6 cm per the report.  A bone scan has been recommended.\par \par We have also reviewed an x-ray of the pelvis (5/24/2022 V[facility]) which showed mild osteoarthritis in his hips and SI joints.

## 2022-06-28 ENCOUNTER — RESULT REVIEW (OUTPATIENT)
Age: 67
End: 2022-06-28

## 2022-06-28 ENCOUNTER — OUTPATIENT (OUTPATIENT)
Dept: OUTPATIENT SERVICES | Facility: HOSPITAL | Age: 67
LOS: 1 days | Discharge: HOME | End: 2022-06-28

## 2022-06-28 DIAGNOSIS — M53.3 SACROCOCCYGEAL DISORDERS, NOT ELSEWHERE CLASSIFIED: ICD-10-CM

## 2022-06-28 DIAGNOSIS — Z98.1 ARTHRODESIS STATUS: Chronic | ICD-10-CM

## 2022-06-28 PROCEDURE — 78803 RP LOCLZJ TUM SPECT 1 AREA: CPT | Mod: 26,MH

## 2022-06-28 PROCEDURE — 78306 BONE IMAGING WHOLE BODY: CPT | Mod: 26,MH

## 2022-07-12 ENCOUNTER — APPOINTMENT (OUTPATIENT)
Dept: NEUROSURGERY | Facility: CLINIC | Age: 67
End: 2022-07-12

## 2022-07-12 VITALS — WEIGHT: 190 LBS | BODY MASS INDEX: 27.2 KG/M2 | HEIGHT: 70 IN

## 2022-07-12 DIAGNOSIS — Z87.891 PERSONAL HISTORY OF NICOTINE DEPENDENCE: ICD-10-CM

## 2022-07-12 PROCEDURE — 99214 OFFICE O/P EST MOD 30 MIN: CPT

## 2022-07-12 RX ORDER — LISINOPRIL 5 MG/1
5 TABLET ORAL
Qty: 90 | Refills: 0 | Status: ACTIVE | COMMUNITY
Start: 2022-06-21

## 2022-07-12 NOTE — ASSESSMENT
[FreeTextEntry1] : Mr. HAYES will continue with physical therapy. He would like to see pain management for injections. If no improvement he may consider surgical intervention. I will see him back in 8 weeks.

## 2022-07-12 NOTE — HISTORY OF PRESENT ILLNESS
[FreeTextEntry1] : Mr. HAYES continues to experience low back pain with neurogenic claudication.  His back pain radiates into his hips and at times into the lateral thighs. He is doing PT which has been providing some relief. He reports some improvement in his pain and range of motion.\par \par - MRI of the lumbar spine, 5/24/2022 [facility]: diffuse lumbar degenerative disc disease with severe stenosis at L3-4 and mild to moderate stenosis at the adjacent segment.  He has Modic changes at the endplates.  Incidentally noted is a rounded focus of marrow signal abnormality within the right aspect of the sacrum measuring 1.6 cm per the report.  A bone scan has been recommended and was completed on 6/28/22. Findings benign, consistent with degenerative changes. \par \par -X-ray of the pelvis (5/24/2022 VZ) which showed mild osteoarthritis in his hips and SI joints.

## 2022-09-13 ENCOUNTER — APPOINTMENT (OUTPATIENT)
Dept: PLASTIC SURGERY | Facility: CLINIC | Age: 67
End: 2022-09-13

## 2022-09-13 PROCEDURE — 99212 OFFICE O/P EST SF 10 MIN: CPT

## 2022-09-13 NOTE — ASSESSMENT
[FreeTextEntry1] : 65 y/o F now 8 months s/p reconstruction of nasal Mohs defect with FTSG from right face, doing well with excellent graft take.\par \par -Continue Scarguard and scar massage \par -Dermatologic surveillance\par -F/U 6 months \par \par \par Due to COVID-19, pre-visit patient instructions were explained to the patient and their symptoms were checked upon arrival. Masks were used by the healthcare provider and staff and the examination room was cleaned after the patient visit concluded\par \par \par 5/17/2022\par as above\par has concerns w shape of right nostril--has mild notching\par s/p FTSG to nasal tip in Oct 2021\par \par requetsed revision\par \par would be better in [place] but pt prefers in office\par \par pt aware limitationi n oimprovement if revision done in office\par \par he will think about re timing-likely fall 2022\par \par Due to COVID 19, pre-visit patient instructions were explained to the patient and their symptoms were checked upon arrival.  \par Masks were used by the health care providers and staff and the examination room was cleaned after the patient visit was completed.\par \par \par 9/13/2022\par as above\par has right volar wrist ganglion--thinned overlying dermis\par Jerry had previously excised this (?)\par \par suggest only tx would be operative excision in [place]\par \par also has notching right ala--suggest revision\par may need ftsg or local flap\par \par Regarding the hand surgery, we discussed the risk of bleeding, infection, need for additional unplanned surgery, need for postoperative hand occupational therapy, possible lack of improvement and diminished hand function.  We discussed prolonged recovery.  All questions were answered and all risks were well understood by the patient.\par \par Regarding the procedure, we discussed scarring, poor wound healing, bleeding, infection, need for additional surgery, and dissatisfaction with the outcome.  Also discussed possibility of keloid and/or hypertrophic scar formation as well as recurrence.  All questions were answered and risks understood.\par \par Due to COVID 19, pre-visit patient instructions were explained to the patient and their symptoms were checked upon arrival.  \par Masks were used by the health care providers and staff and the examination room was cleaned after the patient visit was completed.\par

## 2022-09-13 NOTE — PHYSICAL EXAM
[NI] : Normal [de-identified] : right preauricular donor site healed  [de-identified] : nasal tip graft mature, no evidence of recurrence

## 2022-10-13 ENCOUNTER — APPOINTMENT (OUTPATIENT)
Dept: NEUROSURGERY | Facility: CLINIC | Age: 67
End: 2022-10-13

## 2022-10-13 VITALS — HEIGHT: 70 IN | WEIGHT: 190 LBS | BODY MASS INDEX: 27.2 KG/M2

## 2022-10-13 PROCEDURE — 99072 ADDL SUPL MATRL&STAF TM PHE: CPT

## 2022-10-13 PROCEDURE — 99213 OFFICE O/P EST LOW 20 MIN: CPT

## 2022-10-13 NOTE — ASSESSMENT
[FreeTextEntry1] : Mr. HAYES will continue with physical therapy. He would like to complete a series of injections with pain management (Dr. Johnson). If no improvement he may consider surgical intervention. I will see him back in 8 weeks.

## 2022-10-13 NOTE — HISTORY OF PRESENT ILLNESS
[FreeTextEntry1] : Mr. HAYES presented with low back pain with neurogenic claudication.  His back pain radiates into his hips and at times into the lateral thighs. He is doing PT which has been providing some relief. He has completed 22 sessions of PT. He had an LESI x 1 with Dr. Johnson last week. He reports some improvement in his pain. He is having less leg pain since the injection. \par \par - MRI of the lumbar spine, 5/24/2022 [facility]: diffuse lumbar degenerative disc disease with severe stenosis at L3-4 and mild to moderate stenosis at the adjacent segment.  He has Modic changes at the endplates.  Incidentally noted is a rounded focus of marrow signal abnormality within the right aspect of the sacrum measuring 1.6 cm per the report.  A bone scan has been recommended and was completed on 6/28/22. Findings benign, consistent with degenerative changes. \par \par -X-ray of the pelvis (5/24/2022 [facility]) which showed mild osteoarthritis in his hips and SI joints.

## 2022-11-09 ENCOUNTER — RESULT REVIEW (OUTPATIENT)
Age: 67
End: 2022-11-09

## 2022-11-09 ENCOUNTER — OUTPATIENT (OUTPATIENT)
Dept: OUTPATIENT SERVICES | Facility: HOSPITAL | Age: 67
LOS: 1 days | Discharge: HOME | End: 2022-11-09

## 2022-11-09 VITALS
WEIGHT: 188.05 LBS | TEMPERATURE: 97 F | DIASTOLIC BLOOD PRESSURE: 81 MMHG | RESPIRATION RATE: 18 BRPM | SYSTOLIC BLOOD PRESSURE: 135 MMHG | HEART RATE: 71 BPM | OXYGEN SATURATION: 96 % | HEIGHT: 70 IN

## 2022-11-09 DIAGNOSIS — C44.311 BASAL CELL CARCINOMA OF SKIN OF NOSE: ICD-10-CM

## 2022-11-09 DIAGNOSIS — Z01.818 ENCOUNTER FOR OTHER PREPROCEDURAL EXAMINATION: ICD-10-CM

## 2022-11-09 DIAGNOSIS — Z98.890 OTHER SPECIFIED POSTPROCEDURAL STATES: Chronic | ICD-10-CM

## 2022-11-09 DIAGNOSIS — Z98.1 ARTHRODESIS STATUS: Chronic | ICD-10-CM

## 2022-11-09 LAB
ALBUMIN SERPL ELPH-MCNC: 4.7 G/DL — SIGNIFICANT CHANGE UP (ref 3.5–5.2)
ALP SERPL-CCNC: 122 U/L — HIGH (ref 30–115)
ALT FLD-CCNC: 19 U/L — SIGNIFICANT CHANGE UP (ref 0–41)
ANION GAP SERPL CALC-SCNC: 15 MMOL/L — HIGH (ref 7–14)
APTT BLD: 27.1 SEC — SIGNIFICANT CHANGE UP (ref 27–39.2)
AST SERPL-CCNC: 20 U/L — SIGNIFICANT CHANGE UP (ref 0–41)
BASOPHILS # BLD AUTO: 0.06 K/UL — SIGNIFICANT CHANGE UP (ref 0–0.2)
BASOPHILS NFR BLD AUTO: 1 % — SIGNIFICANT CHANGE UP (ref 0–1)
BILIRUB SERPL-MCNC: 0.4 MG/DL — SIGNIFICANT CHANGE UP (ref 0.2–1.2)
BUN SERPL-MCNC: 9 MG/DL — LOW (ref 10–20)
CALCIUM SERPL-MCNC: 9.7 MG/DL — SIGNIFICANT CHANGE UP (ref 8.4–10.5)
CHLORIDE SERPL-SCNC: 102 MMOL/L — SIGNIFICANT CHANGE UP (ref 98–110)
CO2 SERPL-SCNC: 22 MMOL/L — SIGNIFICANT CHANGE UP (ref 17–32)
CREAT SERPL-MCNC: 0.8 MG/DL — SIGNIFICANT CHANGE UP (ref 0.7–1.5)
EGFR: 99 ML/MIN/1.73M2 — SIGNIFICANT CHANGE UP
EOSINOPHIL # BLD AUTO: 0.28 K/UL — SIGNIFICANT CHANGE UP (ref 0–0.7)
EOSINOPHIL NFR BLD AUTO: 4.8 % — SIGNIFICANT CHANGE UP (ref 0–8)
GLUCOSE SERPL-MCNC: 83 MG/DL — SIGNIFICANT CHANGE UP (ref 70–99)
HCT VFR BLD CALC: 39.1 % — LOW (ref 42–52)
HGB BLD-MCNC: 13.9 G/DL — LOW (ref 14–18)
IMM GRANULOCYTES NFR BLD AUTO: 0.3 % — SIGNIFICANT CHANGE UP (ref 0.1–0.3)
INR BLD: 1.03 RATIO — SIGNIFICANT CHANGE UP (ref 0.65–1.3)
LYMPHOCYTES # BLD AUTO: 1.55 K/UL — SIGNIFICANT CHANGE UP (ref 1.2–3.4)
LYMPHOCYTES # BLD AUTO: 26.4 % — SIGNIFICANT CHANGE UP (ref 20.5–51.1)
MCHC RBC-ENTMCNC: 31.2 PG — HIGH (ref 27–31)
MCHC RBC-ENTMCNC: 35.5 G/DL — SIGNIFICANT CHANGE UP (ref 32–37)
MCV RBC AUTO: 87.7 FL — SIGNIFICANT CHANGE UP (ref 80–94)
MONOCYTES # BLD AUTO: 0.6 K/UL — SIGNIFICANT CHANGE UP (ref 0.1–0.6)
MONOCYTES NFR BLD AUTO: 10.2 % — HIGH (ref 1.7–9.3)
NEUTROPHILS # BLD AUTO: 3.36 K/UL — SIGNIFICANT CHANGE UP (ref 1.4–6.5)
NEUTROPHILS NFR BLD AUTO: 57.3 % — SIGNIFICANT CHANGE UP (ref 42.2–75.2)
NRBC # BLD: 0 /100 WBCS — SIGNIFICANT CHANGE UP (ref 0–0)
PLATELET # BLD AUTO: 239 K/UL — SIGNIFICANT CHANGE UP (ref 130–400)
POTASSIUM SERPL-MCNC: 4 MMOL/L — SIGNIFICANT CHANGE UP (ref 3.5–5)
POTASSIUM SERPL-SCNC: 4 MMOL/L — SIGNIFICANT CHANGE UP (ref 3.5–5)
PROT SERPL-MCNC: 7.3 G/DL — SIGNIFICANT CHANGE UP (ref 6–8)
PROTHROM AB SERPL-ACNC: 11.7 SEC — SIGNIFICANT CHANGE UP (ref 9.95–12.87)
RBC # BLD: 4.46 M/UL — LOW (ref 4.7–6.1)
RBC # FLD: 13.8 % — SIGNIFICANT CHANGE UP (ref 11.5–14.5)
SODIUM SERPL-SCNC: 139 MMOL/L — SIGNIFICANT CHANGE UP (ref 135–146)
WBC # BLD: 5.87 K/UL — SIGNIFICANT CHANGE UP (ref 4.8–10.8)
WBC # FLD AUTO: 5.87 K/UL — SIGNIFICANT CHANGE UP (ref 4.8–10.8)

## 2022-11-09 PROCEDURE — 71046 X-RAY EXAM CHEST 2 VIEWS: CPT | Mod: 26

## 2022-11-09 PROCEDURE — 93010 ELECTROCARDIOGRAM REPORT: CPT

## 2022-11-09 RX ORDER — HYDROCODONE BITARTRATE 50 MG/1
1 CAPSULE, EXTENDED RELEASE ORAL
Qty: 0 | Refills: 0 | DISCHARGE

## 2022-11-09 RX ORDER — GABAPENTIN 400 MG/1
1 CAPSULE ORAL
Qty: 0 | Refills: 0 | DISCHARGE

## 2022-11-09 RX ORDER — MELOXICAM 15 MG/1
1 TABLET ORAL
Qty: 0 | Refills: 0 | DISCHARGE

## 2022-11-09 NOTE — H&P PST ADULT - NSICDXPASTSURGICALHX_GEN_ALL_CORE_FT
PAST SURGICAL HISTORY:  H/O spinal fusion neck    Status post Mohs surgery with local flap reconstruction to nasal area

## 2022-11-09 NOTE — H&P PST ADULT - REASON FOR ADMISSION
Patient is a 66  year old  male presenting to PAST in preparation for  REVISION OF NASAL RECONSTRUCTION WITH LOCAL FLAP OR SKIN GRAFT FROM RIGHT/LEFT FACE EXCISION OF RIGHT WRIST VOLAR GANGLION on   11/30/22 under general anesthesia by Dr. villavicencio

## 2022-11-09 NOTE — H&P PST ADULT - NSICDXPASTMEDICALHX_GEN_ALL_CORE_FT
PAST MEDICAL HISTORY:  Back pain     CA skin, basal cell     Hepatitis-C 3 yrs ago-    HTN (hypertension)

## 2022-11-26 ENCOUNTER — LABORATORY RESULT (OUTPATIENT)
Age: 67
End: 2022-11-26

## 2022-11-29 NOTE — ASU PATIENT PROFILE, ADULT - VISION (WITH CORRECTIVE LENSES IF THE PATIENT USUALLY WEARS THEM):
Vascular surgery/Event Note Normal vision: sees adequately in most situations; can see medication labels, newsprint

## 2022-11-29 NOTE — ASU PATIENT PROFILE, ADULT - FALL HARM RISK - UNIVERSAL INTERVENTIONS
Bed in lowest position, wheels locked, appropriate side rails in place/Call bell, personal items and telephone in reach/Instruct patient to call for assistance before getting out of bed or chair/Non-slip footwear when patient is out of bed/[place] to call system/Physically safe environment - no spills, clutter or unnecessary equipment/Purposeful Proactive Rounding/Room/bathroom lighting operational, light cord in reach

## 2022-11-29 NOTE — ASU PATIENT PROFILE, ADULT - TEACHING/LEARNING RELIGIOUS CONSIDERATIONS
Detail Level: Detailed Depth Of Biopsy: dermis Was A Bandage Applied: Yes Size Of Lesion In Cm: 0 X Size Of Lesion In Cm: 0.8 none Biopsy Type: H and E Biopsy Method: Personna blade Anesthesia Type: 1% lidocaine with 1:100,000 epinephrine and a 1:12 solution of 8.4% sodium bicarbonate Anesthesia Volume In Cc: 1 Hemostasis: Drysol and Electrocautery Wound Care: Petrolatum Dressing: bandage Destruction After The Procedure: No Type Of Destruction Used: Electrodesiccation Cryotherapy Text: The wound bed was treated with cryotherapy after the biopsy was performed. Electrodesiccation Text: The wound bed was treated with electrodesiccation after the biopsy was performed. Electrodesiccation And Curettage Text: The wound bed was treated with electrodesiccation and curettage after the biopsy was performed. Silver Nitrate Text: The wound bed was treated with silver nitrate after the biopsy was performed. Lab: [id number] Lab Facility:  Consent: Written consent was obtained and risks were reviewed including but not limited to scarring, infection, bleeding, scabbing, incomplete removal, nerve damage and allergy to anesthesia. Post-Care Instructions: I reviewed with the patient in detail post-care instructions. Patient is to keep the biopsy site dry overnight, and then apply bacitracin or petroleum ointment to the treated site twice daily until healed.  Patient to advised to look for signs of infection such as redness, purulent drainage, pain, radiating heat from treated site and a low grade fever, if any symptoms occur patient to call the office or return to clinic. Notification Instructions: Patient will be notified of biopsy results. However, patient instructed to call the office if not contacted within 2 weeks. Billing Type: Third-Party Bill Information: Selecting Yes will display possible errors in your note based on the variables you have selected. This validation is only offered as a suggestion for you. PLEASE NOTE THAT THE VALIDATION TEXT WILL BE REMOVED WHEN YOU FINALIZE YOUR NOTE. IF YOU WANT TO FAX A PRELIMINARY NOTE YOU WILL NEED TO TOGGLE THIS TO 'NO' IF YOU DO NOT WANT IT IN YOUR FAXED NOTE. Body Location Override (Optional - Billing Will Still Be Based On Selected Body Map Location If Applicable): right posterior shoulder lateral Size Of Lesion In Cm: 0.4 Body Location Override (Optional - Billing Will Still Be Based On Selected Body Map Location If Applicable): right posterior medial shoulder Size Of Lesion In Cm: 0.5

## 2022-11-30 ENCOUNTER — TRANSCRIPTION ENCOUNTER (OUTPATIENT)
Age: 67
End: 2022-11-30

## 2022-11-30 ENCOUNTER — APPOINTMENT (OUTPATIENT)
Dept: PLASTIC SURGERY | Facility: AMBULATORY SURGERY CENTER | Age: 67
End: 2022-11-30

## 2022-11-30 ENCOUNTER — RESULT REVIEW (OUTPATIENT)
Age: 67
End: 2022-11-30

## 2022-11-30 ENCOUNTER — OUTPATIENT (OUTPATIENT)
Dept: OUTPATIENT SERVICES | Facility: HOSPITAL | Age: 67
LOS: 1 days | Discharge: HOME | End: 2022-11-30

## 2022-11-30 VITALS
HEART RATE: 74 BPM | SYSTOLIC BLOOD PRESSURE: 131 MMHG | DIASTOLIC BLOOD PRESSURE: 81 MMHG | OXYGEN SATURATION: 96 % | RESPIRATION RATE: 18 BRPM

## 2022-11-30 VITALS
HEIGHT: 70 IN | OXYGEN SATURATION: 97 % | RESPIRATION RATE: 18 BRPM | TEMPERATURE: 98 F | HEART RATE: 62 BPM | WEIGHT: 188.05 LBS | SYSTOLIC BLOOD PRESSURE: 130 MMHG | DIASTOLIC BLOOD PRESSURE: 71 MMHG

## 2022-11-30 DIAGNOSIS — Z98.1 ARTHRODESIS STATUS: Chronic | ICD-10-CM

## 2022-11-30 DIAGNOSIS — Z98.890 OTHER SPECIFIED POSTPROCEDURAL STATES: Chronic | ICD-10-CM

## 2022-11-30 PROCEDURE — 88304 TISSUE EXAM BY PATHOLOGIST: CPT | Mod: 26

## 2022-11-30 PROCEDURE — 14060 TIS TRNFR E/N/E/L 10 SQ CM/<: CPT

## 2022-11-30 PROCEDURE — 25111 REMOVE WRIST TENDON LESION: CPT

## 2022-11-30 RX ORDER — HYDROCODONE BITARTRATE 50 MG/1
7.5 CAPSULE, EXTENDED RELEASE ORAL
Qty: 0 | Refills: 0 | DISCHARGE

## 2022-11-30 RX ORDER — OXYCODONE HYDROCHLORIDE 5 MG/1
5 TABLET ORAL ONCE
Refills: 0 | Status: DISCONTINUED | OUTPATIENT
Start: 2022-11-30 | End: 2022-11-30

## 2022-11-30 RX ORDER — OFLOXACIN 0.3 %
1 DROPS OPHTHALMIC (EYE) EVERY 6 HOURS
Refills: 0 | Status: DISCONTINUED | OUTPATIENT
Start: 2022-11-30 | End: 2022-12-14

## 2022-11-30 RX ORDER — SODIUM CHLORIDE 9 MG/ML
1000 INJECTION, SOLUTION INTRAVENOUS
Refills: 0 | Status: DISCONTINUED | OUTPATIENT
Start: 2022-11-30 | End: 2022-12-14

## 2022-11-30 RX ORDER — ONDANSETRON 8 MG/1
4 TABLET, FILM COATED ORAL ONCE
Refills: 0 | Status: DISCONTINUED | OUTPATIENT
Start: 2022-11-30 | End: 2022-12-14

## 2022-11-30 RX ORDER — TRAMADOL HYDROCHLORIDE 50 MG/1
1 TABLET ORAL
Qty: 20 | Refills: 0
Start: 2022-11-30 | End: 2022-12-04

## 2022-11-30 RX ADMIN — SODIUM CHLORIDE 100 MILLILITER(S): 9 INJECTION, SOLUTION INTRAVENOUS at 13:15

## 2022-11-30 RX ADMIN — Medication 1 DROP(S): at 14:06

## 2022-11-30 NOTE — ASU DISCHARGE PLAN (ADULT/PEDIATRIC) - CARE PROVIDER_API CALL
Conrad Trevino)  Plastic Surgery; Surgery of the Hand  [address] [place] [address], [address]  [place] [address]  Phone: [phone]  Fax: [phone]  Follow Up Time:

## 2022-11-30 NOTE — ASU DISCHARGE PLAN (ADULT/PEDIATRIC) - ASU DC SPECIAL INSTRUCTIONSFT
Follow Up with Dr. Méndez in the Office on Friday.  Please call office for confirmation of your appointment.    Please do not remove any of the dressings until following up with Dr. Trevino. Please keep right arm immobilized in sling at all times.     Pain: You can take over the counter medications such as Tylenol, and Ibuprofen for pain control. Please adhere to the instructions on the back of the bottle. If it was discussed that you would be receiving prescription pain medication upon discharge, this prescription will be sent to your pharmacy.    Do not drive while taking narcotic medication.     If you develop fevers, chills, worsening pain, increased drainage from the wound, foul smelling drainage from the wound, nausea that won't subside, vomiting, or any other symptoms of concern, please call MD for further advice, evaluation, and/or treatment.    Activity: Ambulate and get out of bed as tolerated, and with assistance if feeling weak.

## 2022-11-30 NOTE — CHART NOTE - NSCHARTNOTEFT_GEN_A_CORE
PACU ANESTHESIA ADMISSION NOTE      Procedure:   Post op diagnosis:      ____  Intubated  TV:______       Rate: ______      FiO2: ______    _x___  Patent Airway    _x___  Full return of protective reflexes    _x___  Full recovery from anesthesia / back to baseline status    Vitals:    See anesthesia record      Mental Status:  _x___ Awake   _____ Alert   _____ Drowsy   _____ Sedated    Nausea/Vomiting:  _x___  NO       ______Yes,   See Post - Op Orders         Pain Scale (0-10):  __0___    Treatment: _x___ None    ____ See Post - Op/PCA Orders    Post - Operative Fluids:   __x__ Oral   ____ See Post - Op Orders    Plan: Discharge:   _x___Home       _____Floor     _____Critical Care    _____  Other:_________________    Comments:  No anesthesia issues or complications noted.  Discharge when criteria met.

## 2022-12-02 ENCOUNTER — APPOINTMENT (OUTPATIENT)
Dept: PLASTIC SURGERY | Facility: CLINIC | Age: 67
End: 2022-12-02

## 2022-12-02 DIAGNOSIS — M54.50 LOW BACK PAIN, UNSPECIFIED: ICD-10-CM

## 2022-12-02 PROBLEM — C44.91 BASAL CELL CARCINOMA OF SKIN, UNSPECIFIED: Chronic | Status: ACTIVE | Noted: 2022-11-09

## 2022-12-02 PROCEDURE — 99024 POSTOP FOLLOW-UP VISIT: CPT

## 2022-12-02 NOTE — HISTORY OF PRESENT ILLNESS
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 Doing well. Denies significant pain, f/c, or bleeding.\par \par Interval hx (11/16/21): Pt presents today 3 weeks s/p reconstruction of nasal Mohs defect with FTSG from right face. Doing well. Denies significant pain, f/c, or bleeding.\par \par Interval hx (5/17/22): Pt presents today 8 months s/p reconstruction of nasal Mohs defect with FTSG from right face. Doing well.\par \par Interval hx (12/2/22): Pt is POD# 2 s/p recon of nasal mohs defect using local flap & excision of R volar wrist galnglion cyst on 11/30/22. Pt denies fever/chills/cp/SOB, LE pain or swelling. Here for nasal bolster removal & bandage change. C/o numbness and tingeling over R fingertips. Minimal pain.

## 2022-12-02 NOTE — PHYSICAL EXAM
[NI] : Normal [de-identified] : Well developed, well nourished in NAD\par  [de-identified] : right preauricular donor site healed  [de-identified] : nasal septum incision is CDI with sutures in place.  No cellulitis/drainage/open areas noted after R nare bolster removal. [de-identified] : Non labored [de-identified] : Neg homans b/l  [de-identified] : Entire R hand is warm and well perfused with FROM & intact sensation.   R volar wrist incision to mid-forearm with significant ecchymosis & swelling as expected. Incision line CDI. No seroma/hematoma palpable. Sutures in place.

## 2022-12-07 ENCOUNTER — APPOINTMENT (OUTPATIENT)
Dept: PLASTIC SURGERY | Facility: CLINIC | Age: 67
End: 2022-12-07

## 2022-12-07 LAB — SURGICAL PATHOLOGY STUDY: SIGNIFICANT CHANGE UP

## 2022-12-07 PROCEDURE — 99024 POSTOP FOLLOW-UP VISIT: CPT

## 2022-12-07 NOTE — ASSESSMENT
[FreeTextEntry1] : 68 y/o M s/p reconstruction of nasal Mohs defect with FTSG from right face Oct 2021 who requested revision secondary to mild notching on R nostril. Also c/o recurrence of R volar wrist ganglion cyst after removal with . \par \par Pt is now POD# 7 s/p recon of nasal mohs defect using local flap & excision of R volar wrist galnglion cyst on 11/30/22. Doing well \par \par - D/c nasal septum sutures 1 week. Steris placed. Aquaphor once they fall off on their own \par - R volar wrist incision: All bandages changed,compression dressing placed. Light use of hand is ok \par - Hand elevation \par - signs and symptoms of infection reviewed\par - No heavy lifting/pushing/pulling\par - All post op instructions reviewed, all questions answered\par - f/u 1 week for suture removal & scar management \par \par \par Due to COVID-19, pre-visit patient instructions were explained to the patient and their symptoms were checked upon arrival. Masks were used by the healthcare provider and staff and the examination room was cleaned after the patient visit concluded\par

## 2022-12-07 NOTE — PHYSICAL EXAM
[NI] : Normal [de-identified] : Well developed, well nourished in NAD\par  [de-identified] : right preauricular donor site healed  [de-identified] : nasal septum incision is CDI with sutures in place.  No cellulitis/drainage/open areas noted. Skin edges well approximated  [de-identified] : Non labored [de-identified] : Entire R hand is warm and well perfused with FROM & intact sensation.   R volar wrist incision to mid-forearm with resolving ecchymosis, forearm previously with more diffuse swelling at last weeks appt, now more localized over incision side.  Incision line CDI. No seroma/hematoma palpable, only STS.  Suture tails in place.

## 2022-12-07 NOTE — HISTORY OF PRESENT ILLNESS
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 Doing well. Denies significant pain, f/c, or bleeding.\par \par Interval hx (11/16/21): Pt presents today 3 weeks s/p reconstruction of nasal Mohs defect with FTSG from right face. Doing well. Denies significant pain, f/c, or bleeding.\par \par Interval hx (5/17/22): Pt presents today 8 months s/p reconstruction of nasal Mohs defect with FTSG from right face. Doing well.\par \par Interval hx (12/2/22): Pt is POD# 2 s/p recon of nasal mohs defect using local flap & excision of R volar wrist galnglion cyst on 11/30/22. Pt denies fever/chills/cp/SOB, LE pain or swelling. Here for nasal bolster removal & bandage change. C/o numbness and tingeling over R fingertips. Minimal pain. \par \par Interval hx (12/7/22): Pt is POD# 7  s/p recon of nasal mohs defect using local flap & excision of R volar wrist galnglion cyst on 11/30/22. Pt is overall doing well. C/o swelling over volar wrist, denies significant numbness/tingeling or pain.

## 2022-12-08 DIAGNOSIS — M67.431 GANGLION, RIGHT WRIST: ICD-10-CM

## 2022-12-08 DIAGNOSIS — C44.311 BASAL CELL CARCINOMA OF SKIN OF NOSE: ICD-10-CM

## 2022-12-08 DIAGNOSIS — I10 ESSENTIAL (PRIMARY) HYPERTENSION: ICD-10-CM

## 2022-12-14 ENCOUNTER — APPOINTMENT (OUTPATIENT)
Dept: PLASTIC SURGERY | Facility: CLINIC | Age: 67
End: 2022-12-14

## 2022-12-14 PROCEDURE — 99024 POSTOP FOLLOW-UP VISIT: CPT

## 2022-12-14 NOTE — PHYSICAL EXAM
[NI] : Normal [de-identified] : Well developed, well nourished in NAD\par  [de-identified] : right preauricular donor site healed  [de-identified] : Nose - nasal tip with mature graft, columellar incision healing well, c/d/i, good overall contour  [de-identified] : Non labored [de-identified] : R hand is warm and well perfused with FROM & intact sensation. R volar wrist incision healing well, CDI. No seroma/hematoma or cellulitis.

## 2022-12-14 NOTE — ASSESSMENT
[FreeTextEntry1] : 68 y/o M s/p reconstruction of nasal Mohs defect with FTSG from right face Oct 2021 who requested revision secondary to mild notching on R nostril. Also c/o recurrence of R volar wrist ganglion cyst after removal with . \par \par Now POD# 14 s/p revision of right nasal Mohs reconstruction and excision of R volar wrist ganglion cyst on. Doing well. \par \par - R volar wrist incision: suture tails removed, steri strip applied\par - Daily Aquaphor to both incisions \par - Hand elevation \par - signs and symptoms of infection reviewed\par - No heavy lifting/pushing/pulling\par - All post op instructions reviewed, all questions answered\par - F/u 6-8 weeks with Dr. Trevino. \par \par Due to COVID-19, pre-visit patient instructions were explained to the patient and their symptoms were checked upon arrival. Masks were used by the healthcare provider and staff and the examination room was cleaned after the patient visit concluded\par

## 2022-12-14 NOTE — HISTORY OF PRESENT ILLNESS
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 Doing well. Denies significant pain, f/c, or bleeding.\par \par Interval hx (11/16/21): Pt presents today 3 weeks s/p reconstruction of nasal Mohs defect with FTSG from right face. Doing well. Denies significant pain, f/c, or bleeding.\par \par Interval hx (5/17/22): Pt presents today 8 months s/p reconstruction of nasal Mohs defect with FTSG from right face. Doing well.\par \par Interval hx (12/2/22): Pt is POD# 2 s/p recon of nasal mohs defect using local flap & excision of R volar wrist galnglion cyst on 11/30/22. Pt denies fever/chills/cp/SOB, LE pain or swelling. Here for nasal bolster removal & bandage change. C/o numbness and tingeling over R fingertips. Minimal pain. \par \par Interval hx (12/7/22): Pt is POD# 7 s/p recon of nasal mohs defect using local flap & excision of R volar wrist galnglion cyst on 11/30/22. Pt is overall doing well. C/o swelling over volar wrist, denies significant numbness/tingeling or pain. \par \par Interval hx (12/14/22): Pt presents today POD#14 s/p revision of nasal Mohs reconstruction & excision of R volar wrist ganglion cyst. Doing well with incisional discomfort as expected. Denies any f/c or bleeding.

## 2023-02-07 ENCOUNTER — APPOINTMENT (OUTPATIENT)
Dept: PLASTIC SURGERY | Facility: CLINIC | Age: 68
End: 2023-02-07
Payer: MEDICARE

## 2023-02-07 DIAGNOSIS — Z98.890 ACQUIRED DEFORMITY OF NOSE: ICD-10-CM

## 2023-02-07 DIAGNOSIS — M95.0 ACQUIRED DEFORMITY OF NOSE: ICD-10-CM

## 2023-02-07 DIAGNOSIS — M67.431 GANGLION, RIGHT WRIST: ICD-10-CM

## 2023-02-07 PROCEDURE — 99024 POSTOP FOLLOW-UP VISIT: CPT

## 2023-02-07 NOTE — HISTORY OF PRESENT ILLNESS
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 Doing well. Denies significant pain, f/c, or bleeding.\par \par Interval hx (11/16/21): Pt presents today 3 weeks s/p reconstruction of nasal Mohs defect with FTSG from right face. Doing well. Denies significant pain, f/c, or bleeding.\par \par Interval hx (5/17/22): Pt presents today 8 months s/p reconstruction of nasal Mohs defect with FTSG from right face. Doing well.\par \par Interval hx (12/2/22): Pt is POD# 2 s/p recon of nasal mohs defect using local flap & excision of R volar wrist galnglion cyst on 11/30/22. Pt denies fever/chills/cp/SOB, LE pain or swelling. Here for nasal bolster removal & bandage change. C/o numbness and tingeling over R fingertips. Minimal pain. \par \par Interval hx (12/7/22): Pt is POD# 7 s/p recon of nasal mohs defect using local flap & excision of R volar wrist galnglion cyst on 11/30/22. Pt is overall doing well. C/o swelling over volar wrist, denies significant numbness/tingeling or pain. \par \par Interval hx (12/14/22): Pt presents today POD#14 s/p revision of nasal Mohs reconstruction & excision of R volar wrist ganglion cyst. Doing well with incisional discomfort as expected. Denies any f/c or bleeding. \par \par Interval hx (2/7/23): Pt here 10 weeks s/p revision of nasal Mohs reconstruction & excision of R volar wrist ganglion cyst. Doing well.

## 2023-02-07 NOTE — PHYSICAL EXAM
[NI] : Normal [de-identified] : Well developed, well nourished in NAD\par  [de-identified] : right preauricular donor site healed  [de-identified] : Nose - nasal tip with mature graft, columellar incision healing well, c/d/i, good overall contour  [de-identified] : Non labored [de-identified] : R hand is warm and well perfused with FROM & intact sensation. R volar wrist incision healing well, CDI. No seroma/hematoma or cellulitis.

## 2023-02-07 NOTE — ASSESSMENT
[FreeTextEntry1] : 66 y/o M s/p reconstruction of nasal Mohs defect with FTSG from right face Oct 2021 who requested revision secondary to mild notching on R nostril. Also c/o recurrence of R volar wrist ganglion cyst after removal with . \par \par Now 10 weeks s/p revision of right nasal Mohs reconstruction and excision of R volar wrist ganglion cyst on. Doing well. \par \par - Daily Aquaphor to both incisions \par - Scar massage \par - All post op instructions reviewed, all questions answered\par - F/u 3 months \par \par Due to COVID-19, pre-visit patient instructions were explained to the patient and their symptoms were checked upon arrival. Masks were used by the healthcare provider and staff and the examination room was cleaned after the patient visit concluded\par \par 2/7/2023\par as above\par pt happy s/p nasal recon--as am I\par right volar ganglion w normal postop scar tissue\par \par f/u 6 months\par \par no issues\par \par Due to COVID 19, pre-visit patient instructions were explained to the patient and their symptoms were checked upon arrival.  \par Masks were used by the health care providers and staff and the examination room was cleaned after the patient visit was completed.\par \par

## 2023-02-10 NOTE — PRE-ANESTHESIA EVALUATION ADULT - NSANTHOSAYNRD_GEN_A_CORE
Subjective:      Patient ID: Telma Ann is a 82 y.o. male. HPI  Patient comes in today for follow-up on his thyroid and history of dementia. He has seen neurosurgery recently who did a therapeutic large-volume lumbar puncture that seemed to help with some of the pressure type sensation he had had in his head. He also stopped taking Lions ihsan which he believes was causing some of his symptoms of headaches as well as worsening confusion. Patient has [facility] by [facility] and continues on Aricept. He will follow-up with neurosurgery in the near future. Past Medical History:   Diagnosis Date    Arthritis     cervical osteoarthritis    DDD (degenerative disc disease) 10/15/2012    Environmental allergies     pet dander    GERD (gastroesophageal reflux disease)     omeprazole    Hiatal hernia 10/15/2012    Hypercholesteremia 10/15/2012    Hypertension, essential 10/23/2017    Hypothyroidism (acquired) 5/3/2015    Left shoulder pain     Nausea & vomiting     Other ill-defined conditions(799.89)     Tarlov's cyst    Other ill-defined conditions(799.89)     BURSITIS L HIP    Salivary gland disorder     Short-term memory loss     Squamous cell cancer of tongue ([address] 2015    radiation    Unspecified adverse effect of anesthesia     post op migraines       Allergies   Allergen Reactions    Codeine     Germanium Other (See Comments) and Headaches     Other reaction(s): Headache-I  Other reaction(s): Headache-I      Hydrocodone Nausea Only    Lactose Headaches     Other reaction(s): Other- (not listed) - Allergy  Cheddar cheese  Cheddar cheese    Macadamia Nut Oil Headaches     Allergic to chocolate    Meloxicam Other (See Comments)     Other reaction(s):  Other- (not listed) - Allergy    Other      Aged cheese which causes headaches    Penicillins Hives     Pt states he is not allergic to PCN  Hives and rash    Nitrous Oxide Nausea And Vomiting and Other (See Comments) Migraines  Other reaction(s): Headache-Intolerance  Other reaction(s): Headache-I  Migraines  Migraines         Current Outpatient Medications   Medication Sig Dispense Refill    memantine (NAMENDA) 5 MG tablet Take 1 tablet by mouth 2 times daily Once a day for a week then twice a day 180 tablet 1    rosuvastatin (CRESTOR) 10 MG tablet Take 1 tablet by mouth nightly 90 tablet 3    levothyroxine (SYNTHROID) 75 MCG tablet Take 1 tablet by mouth Daily 90 tablet 3    tamsulosin (FLOMAX) 0.4 MG capsule Take 1 capsule by mouth daily 90 capsule 3    famotidine (PEPCID) 40 MG tablet Take 1 tablet by mouth 2 times daily 60 tablet 12    aspirin 81 MG EC tablet Take 81 mg by mouth      Potassium Gluconate 550 MG TABS Take by mouth daily      midodrine (PROAMATINE) 10 MG tablet Take 1 tablet by mouth 3 times daily 270 tablet 3    aspirin-acetaminophen-caffeine (EXCEDRIN MIGRAINE) 250-250-65 MG per tablet Take 1 tablet by mouth as needed      donepezil (ARICEPT) 10 MG tablet Take 10 mg by mouth      ergocalciferol (ERGOCALCIFEROL) 1.25 MG (95702 UT) capsule Take 50,000 Units by mouth every 7 days      loratadine (CLARITIN) 10 MG tablet Take 10 mg by mouth      senna (SENOKOT) 8.6 MG tablet Take 2 tablets by mouth as needed       No current facility-administered medications for this visit. Social History     Tobacco Use    Smoking status: Never    Smokeless tobacco: Never   Substance Use Topics    Alcohol use: No    Drug use: No     Review of Systems   Neurological:  Positive for headaches. Psychiatric/Behavioral:  Positive for confusion. Blood pressure 131/80, pulse 56, temperature 98.1 °F (36.7 °C), temperature source Temporal, resp. rate 18, height 5' 7\" (1.702 m), weight 178 lb 3.2 oz (80.8 kg), SpO2 97 %. Objective:   Physical Exam  Vitals reviewed. Constitutional:       General: He is not in acute distress. Appearance: Normal appearance.    Cardiovascular:      Rate and Rhythm: Normal rate and regular rhythm. Heart sounds: No murmur heard. Pulmonary:      Effort: Pulmonary effort is normal.      Breath sounds: Normal breath sounds. Neurological:      General: No focal deficit present. Mental Status: He is alert and oriented to person, place, and time. Assessment / Plan:    Cruzito Newby was seen today for follow-up. Diagnoses and all orders for this visit:    Hypertension, essential  -     CBC with Auto Differential; Future  -     Comprehensive Metabolic Panel; Future    Gastroesophageal reflux disease without esophagitis    Hypothyroidism (acquired)  -     TSH; Future  -     T4, Free; Future    Hypercholesteremia    Special screening for malignant neoplasm of prostate  -     PSA Screening; Future    Dementia without behavioral disturbance, psychotic disturbance, mood disturbance, or anxiety, unspecified dementia severity, unspecified dementia type (HCC)  -     memantine (NAMENDA) 5 MG tablet; Take 1 tablet by mouth 2 times daily Once a day for a week then twice a day    Malignant neoplasm of tongue, unspecified ([facility]       Will notify patient of test results. We will try Namenda in addition to the Aricept and follow-up with neurosurgery as planned. Follow-up and Dispositions    Return in about 3 months (around 5/10/2023), or if symptoms worsen or fail to improve. Dictated using voice recognition software.  Proofread, but unrecognized errors may exist. No. LUNA screening performed.  STOP BANG Legend: 0-2 = LOW Risk; 3-4 = INTERMEDIATE Risk; 5-8 = HIGH Risk

## 2023-06-28 ENCOUNTER — APPOINTMENT (OUTPATIENT)
Dept: NEUROSURGERY | Facility: CLINIC | Age: 68
End: 2023-06-28
Payer: MEDICARE

## 2023-06-28 VITALS — BODY MASS INDEX: 27.2 KG/M2 | WEIGHT: 190 LBS | HEIGHT: 70 IN

## 2023-06-28 PROCEDURE — 99214 OFFICE O/P EST MOD 30 MIN: CPT

## 2023-06-28 NOTE — ASSESSMENT
[FreeTextEntry1] : Mr. HAYES would like to focus on his possible hernia repairs with Dr. Walter before considering lumbar surgery.  I will see him back in 3 months, sooner if needed.  At that time updated MRI/x-rays of the lumbar spine will be ordered for possible surgical planning.  He is agreeable with the plan of care.\par \par \par Deepthi Briseno, MS PA-C\par Chip Khoury MD\par

## 2023-06-28 NOTE — HISTORY OF PRESENT ILLNESS
[FreeTextEntry1] : Mr. HAYES continues to experience lower back pain with neurogenic claudication. Symptoms are worse on the right side. His pain radiates into his hips and at times into the lateral thighs. \par \par He has trialed PT with minimal relief. He had LESIs with Dr. Johnson which gave him temporary relief of his leg pain. \par \par Today, he is scheduled to see Dr. Walter for evaluation for hernia repairs. If surgery is an option he would like to proceed with this before considering lumbar surgery. \par \par  DIAGNOSTIC TESTING REVIEWED: \par \par - MRI of the lumbar spine, 5/24/2022 VZ: diffuse lumbar degenerative disc disease with severe stenosis at L3-4 and mild to moderate stenosis at the adjacent segment.  He has Modic changes at the endplates.  Incidentally noted is a rounded focus of marrow signal abnormality within the right aspect of the sacrum measuring 1.6 cm per the report.  A bone scan has been recommended and was completed on 6/28/22. Findings benign, consistent with degenerative changes. \par \par -X-ray of the pelvis (5/24/2022 VZ) which showed mild osteoarthritis in his hips and SI joints.

## 2023-06-28 NOTE — PHYSICAL EXAM
[FreeTextEntry1] : Constitutional: Well appearing, no distress\par HEENT: Normocephalic Atraumatic\par Psychiatric: Alert and oriented to all spheres, normal mood\par Pulmonary: No respiratory distress\par Neurologic: \par CN II-XII grossly intact\par Palpation: + Lumbar/SI joint tenderness.\par Strength: Full strength lower extremities.\par Sensation: Full sensation lower extremities.\par Reflexes: Brisk.\par Restriction in ROM lumbar spine.\par SLR negative\par Gait: steady, walking w/o assistance. \par \par

## 2023-09-12 ENCOUNTER — APPOINTMENT (OUTPATIENT)
Dept: PLASTIC SURGERY | Facility: CLINIC | Age: 68
End: 2023-09-12
Payer: MEDICARE

## 2023-09-12 DIAGNOSIS — M19.039 PRIMARY OSTEOARTHRITIS, UNSPECIFIED WRIST: ICD-10-CM

## 2023-09-12 PROCEDURE — 99212 OFFICE O/P EST SF 10 MIN: CPT

## 2023-09-20 ENCOUNTER — APPOINTMENT (OUTPATIENT)
Dept: NEUROSURGERY | Facility: CLINIC | Age: 68
End: 2023-09-20
Payer: MEDICARE

## 2023-09-20 VITALS — WEIGHT: 190 LBS | BODY MASS INDEX: 27.2 KG/M2 | HEIGHT: 70 IN

## 2023-09-20 DIAGNOSIS — M47.816 SPONDYLOSIS W/OUT MYELOPATHY OR RADICULOPATHY, LUMBAR REGION: ICD-10-CM

## 2023-09-20 DIAGNOSIS — M48.061 SPINAL STENOSIS, LUMBAR REGION WITHOUT NEUROGENIC CLAUDICATION: ICD-10-CM

## 2023-09-20 PROCEDURE — 99214 OFFICE O/P EST MOD 30 MIN: CPT

## 2023-09-25 NOTE — PRE-ANESTHESIA EVALUATION ADULT - TEMPERATURE IN CELSIUS (DEGREES C)
"Chief Complaint  Establish Care, Annual Exam, and Medical Clearance  (Medical Clearance for eye surgery )    Subjective        Haris Cormier presents to [facility] for establishment of care.    Patient is needing to establish with primary care provider.  Patient reports he has not had a primary care provider since moving to [place], approximately 6 years ago.  Previously living in [place].  He did have a PCP there.  He denies any previous diagnoses other than glaucoma of both eyes.  That is one of the primary reasons he is here today, he needs a preop exam in order to have glaucoma surgery on his eyes.  He reports he has had glaucoma surgery already on the left, but never on the right.  Otherwise, he reports an unremarkable medical history as an adult and child.  He is not currently on any medications, has no allergies that he is aware of.  He does report type 2 diabetes and hypertension that runs in the family, no known cancers that he is aware of.  Patient is behind on vaccines, would like to discuss today.  No previous lab work completed.    Objective   Vital Signs:   /50 (BP Location: Left arm, Patient Position: Sitting, Cuff Size: Large Adult)   Pulse 76   Temp 97.8 °F (36.6 °C) (Temporal)   Resp 20   Ht 175.5 cm (69.09\")   Wt 122 kg (269 lb 3.2 oz)   SpO2 97%   BMI 39.65 kg/m²     Physical Exam  Vitals and nursing note reviewed.   Constitutional:       Appearance: Normal appearance. He is obese.   HENT:      Head: Normocephalic and atraumatic.      Right Ear: Tympanic membrane, ear canal and external ear normal.      Left Ear: Tympanic membrane, ear canal and external ear normal.      Nose: Nose normal.      Mouth/Throat:      Mouth: Mucous membranes are moist.      Pharynx: Oropharynx is clear.   Eyes:      Conjunctiva/sclera: Conjunctivae normal.      Pupils: Pupils are equal, round, and reactive to light.   Cardiovascular:      Rate and Rhythm: Normal rate and " regular rhythm.      Pulses: Normal pulses.      Heart sounds: Normal heart sounds.   Pulmonary:      Effort: Pulmonary effort is normal.      Breath sounds: Normal breath sounds.   Abdominal:      General: Abdomen is flat. Bowel sounds are normal.   Musculoskeletal:      Cervical back: Normal range of motion and neck supple.   Skin:     Capillary Refill: Capillary refill takes less than 2 seconds.   Neurological:      General: No focal deficit present.      Mental Status: He is alert.   Psychiatric:         Mood and Affect: Mood normal.      Result Review :  {The following data was reviewed by ZENAIDA Solo on 09/25/23                Diagnoses and all orders for this visit:    1. Establishing care with new doctor, encounter for (Primary)    2. Annual physical exam  Assessment & Plan:  Screening labs reviewed/ordered  Counseling provided regarding age appropriate screenings and immunizations, healthy diet and exercise.       Orders:  -     Comprehensive Metabolic Panel  -     CBC & Differential  -     TSH  -     Lipid Panel    3. Lipid screening  -     Lipid Panel    4. Thyroid disorder screen  -     TSH    5. Prostate cancer screening  -     PSA Screen    6. Colon cancer screening  -     Cologuard - Stool, Per Rectum; Future    7. Need for influenza vaccination  -     Fluzone >6 Months (9922-9300)    8. Need for shingles vaccine  -     Shingrix Vaccine    9. Need for tetanus booster  -     Td Vaccine => 8yo PF (Tenivac) 5-2    10. Elevated blood pressure reading in office with diagnosis of hypertension  Assessment & Plan:  Blood pressure elevated in the office today.  Patient does not check blood pressure at home, we discussed him starting to check his blood pressure regularly at home, he will purchase blood pressure cuff, monitor over the next couple of weeks.  He has not had enough today to prevent him from surgery, however I discussed with him that on the day of surgery if his blood pressure is elevated,  they may not want to perform surgery.  He needs to disclose this with them, and it has been marked on his preop exam paperwork.  We discussed sodium reduction, caffeine reduction, patient does not use any nicotine or illicit drugs.  Discussed regular exercise.  We discussed signs and symptoms of elevated blood pressure.  Patient will monitor, and follow-up accordingly, our goal is 140/90 or under.      11. Glaucoma of both eyes, unspecified glaucoma type    12. Class 2 obesity due to excess calories without serious comorbidity with body mass index (BMI) of 39.0 to 39.9 in adult  Assessment & Plan:  Patient's (Body mass index is 39.65 kg/m².) indicates that they are obese (BMI >30) with health conditions that include none . Weight is newly identified. BMI  is above average; BMI management plan is completed. We discussed portion control and increasing exercise.       13. Preop examination    Blood work was completed today, physical exam is unremarkable, no concerns.  We did discuss blood pressure, see above.  Patient to monitor, follow-up as needed.  Paperwork completed.      Follow Up   Return in about 1 year (around 9/25/2024) for Annual physical, sooner if needed.  Patient was given instructions and counseling regarding his condition or for health maintenance advice. Please see specific information pulled into the AVS if appropriate.     Ghassan Berger, ZENAIDA  9/25/2023  This note was electronically signed.        36.7

## 2023-10-05 ENCOUNTER — OUTPATIENT (OUTPATIENT)
Dept: OUTPATIENT SERVICES | Facility: HOSPITAL | Age: 68
LOS: 1 days | End: 2023-10-05
Payer: MEDICARE

## 2023-10-05 ENCOUNTER — RESULT REVIEW (OUTPATIENT)
Age: 68
End: 2023-10-05

## 2023-10-05 DIAGNOSIS — Z98.1 ARTHRODESIS STATUS: Chronic | ICD-10-CM

## 2023-10-05 DIAGNOSIS — Z98.890 OTHER SPECIFIED POSTPROCEDURAL STATES: Chronic | ICD-10-CM

## 2023-10-05 DIAGNOSIS — M19.039 PRIMARY OSTEOARTHRITIS, UNSPECIFIED WRIST: ICD-10-CM

## 2023-10-05 PROCEDURE — 73110 X-RAY EXAM OF WRIST: CPT | Mod: 50

## 2023-10-05 PROCEDURE — 73110 X-RAY EXAM OF WRIST: CPT | Mod: 26,50

## 2023-10-06 DIAGNOSIS — M19.039 PRIMARY OSTEOARTHRITIS, UNSPECIFIED WRIST: ICD-10-CM

## 2023-11-03 ENCOUNTER — NON-APPOINTMENT (OUTPATIENT)
Age: 68
End: 2023-11-03

## 2024-04-28 NOTE — H&P PST ADULT - HISTORY OF PRESENT ILLNESS
Follow-up with your cardiologist and primary care doctor.      Chest Pain    Chest pain can be caused by many different conditions which may or may not be dangerous. Causes include heartburn, lung infections, heart attack, blood clot in lungs, skin infections, strain or damage to muscle, cartilage, or bones, etc. In addition to a history and physical examination, an electrocardiogram (ECG) or other lab tests may have been performed to determine the cause of your chest pain. Follow up with your primary care provider or with a cardiologist as instructed.     SEEK IMMEDIATE MEDICAL CARE IF YOU HAVE ANY OF THE FOLLOWING SYMPTOMS: worsening chest pain, coughing up blood, unexplained back/neck/jaw pain, severe abdominal pain, dizziness or lightheadedness, fainting, shortness of breath, sweaty or clammy skin, vomiting, or racing heart beat. These symptoms may represent a serious problem that is an emergency. Do not wait to see if the symptoms will go away. Get medical help right away. Call 911 and do not drive yourself to the hospital.
pt with skin ca to nose had procedure 11/21- has divot to area and also right wrist ganglion cyst   now for planned procedure       PATIENT CURRENTLY DENIES CHEST PAIN  SHORTNESS OF BREATH  PALPITATIONS,  DYSURIA, OR UPPER RESPIRATORY INFECTION IN PAST 2 WEEKS  EXERCISE  TOLERANCE  1-2 FLIGHT OF STAIRS  WITHOUT SHORTNESS OF BREATH  denies travel outside the [place] in the past 30 days  Patient denies any signs or symptoms of COVID 19 and denies contact with known positive individuals.  They have an appointment for repeat COVID testing pre-procedure and acknowledge its time and place.  They were instructed to quarantine pre-procedure, practice exposure control measures, continue to self-monitor and report any concerns to their proceduralist.  pt advised self quarantine till day of procedure  Anesthesia Alert  NO--Difficult Airway  NO--History of neck or radiation  + neck surgery   NO--Limited ROM of neck  NO--History of Malignant hyperthermia  NO--No personal or family history of Pseudocholinesterase deficiency.  NO--Prior Anesthesia Complication  NO--Latex Allergy  NO--Loose teeth  NO--History of Rheumatoid Arthritis  NO--Bleeding risk  NO--LUNA  NO--Other_____    PT DENIES ANY RASHES, ABRASION, OR OPEN WOUNDS OR CUTS    AS PER THE PT, THIS IS HIS/HER COMPLETE MEDICAL AND SURGICAL HX, INCLUDING MEDICATIONS PRESCRIBED AND OVER THE COUNTER    Patient verbalized understanding of instructions and was given the opportunity to ask questions and have them answered.    pt denies any suicidal ideation or thoughts, pt states not a threat to self or others    C44.311,M67.431/22221,04288,62672    Basal cell carcinoma (BCC) of skin of nose    Encounter for other preprocedural examination    ^C44.311,M67.431/85030,20793,83728    Basal cell carcinoma (BCC) of skin of nose    Encounter for other preprocedural examination    HTN (hypertension)    Back pain    Hepatitis-C    Hepatitis-C    No significant past surgical history    H/O spinal fusion

## 2024-05-06 NOTE — DISCHARGE NOTE PROVIDER - CARE PROVIDER_API CALL
Chip Khoury)  Neurological Surgery  [address] [place] [address], [address]  [place] [address]  Phone: [phone]  Fax: [phone]  Follow Up Time:     Abel Glaser)  Internal Medicine  [address] [place] [address]  Phone: [phone]  Fax: [phone]  Follow Up Time: Condition:: Bump on lip Please Describe Your Condition:: NA

## 2024-11-01 ENCOUNTER — APPOINTMENT (OUTPATIENT)
Dept: PLASTIC SURGERY | Facility: CLINIC | Age: 69
End: 2024-11-01
Payer: MEDICARE

## 2024-11-01 PROCEDURE — 99212 OFFICE O/P EST SF 10 MIN: CPT

## 2024-11-01 RX ORDER — HYDROQUINONE 40 MG/G
4 CREAM TOPICAL TWICE DAILY
Qty: 1 | Refills: 2 | Status: ACTIVE | COMMUNITY
Start: 2024-11-01 | End: 1900-01-01

## 2024-12-11 NOTE — PATIENT PROFILE ADULT - LAST ORAL INTAKE
"Daily Note     Today's date: 2024  Patient name: Devyn Sher  : 1963  MRN: [id number]  Referring provider: Millicent Huff, *  Dx:   Encounter Diagnosis     ICD-10-CM    1. Chronic midline low back pain without sciatica  M54.50     G89.29                      Subjective: I have been doing my PPU seem to help < sx's R post knee  I have > pain LB/R post knee with driving in car/sitting long time  When I walk / do TM  activ sx's improve  I have not been doing nerve glides       Objective: See treatment diary below      Assessment: Tolerated treatment well. Progressed POC to pt gris  Pt reported < radicular sx's R LE with > centralization LB  Patient provided verbal/tactile cues prn for proper form and technique with exercises completed this date.  Pt demon < flex/mobil R LE  noted HS tightness, < LTR /ROM to left  Patient would benefit from continued PT      Plan: Continue per plan of care.      Precautions: T2DM, Anxiety, Hearing loss, HTN, C5-6 fusion hx,       Re-eval Date: 25  Insurance: 20 visits    Date        Visit Count        FOTO        Pain In  /10  @ worst 7-8/10      Pain Out  /10            Manuals        L/S STM         L/S mobs         Nerve glides  20x supine sciatic 20x supine sciatic              Neuro Re-Ed        Repeated motions         Prone press up  X10  10x    Wall 10x 5\"      EIS  X10        Prone press up with OP         EIS with dowel         MILKA  2x30\"      TrA supine         Seated sciatic nerve glides  X20  20x                      Ther Ex        Row   Francisco  10# TA  2x10 5\"      LPD   Francisco  10# TA  2x10 5\"      LTR   Supine  10x 5\"                              TM bloodflow         Bike bloodflow   10 min  2.0 mph  Cues heel toe  < L toe clearance      Ther Activity                        Gait Training                        Modalities                             " 10-Adonis-2019 19:53

## 2025-01-07 ENCOUNTER — APPOINTMENT (OUTPATIENT)
Dept: PLASTIC SURGERY | Facility: CLINIC | Age: 70
End: 2025-01-07

## 2025-05-05 NOTE — H&P PST ADULT - WEIGHT IN LBS
RD sent message that with alcohol history and issues with elevated blood sugars, felt patient needs referral to endocrinology.  I have placed this order and advised his RD to contact him to make sure he is aware this was placed.  Yumi Hooks NP on 5/5/2025 at 10:33 AM    
188